# Patient Record
Sex: MALE | Race: WHITE | NOT HISPANIC OR LATINO | Employment: UNEMPLOYED | URBAN - METROPOLITAN AREA
[De-identification: names, ages, dates, MRNs, and addresses within clinical notes are randomized per-mention and may not be internally consistent; named-entity substitution may affect disease eponyms.]

---

## 2019-07-14 ENCOUNTER — HOSPITAL ENCOUNTER (EMERGENCY)
Facility: MEDICAL CENTER | Age: 59
End: 2019-07-15
Attending: EMERGENCY MEDICINE
Payer: MEDICARE

## 2019-07-14 ENCOUNTER — APPOINTMENT (OUTPATIENT)
Dept: RADIOLOGY | Facility: MEDICAL CENTER | Age: 59
End: 2019-07-14
Attending: EMERGENCY MEDICINE
Payer: MEDICARE

## 2019-07-14 DIAGNOSIS — S42.031A CLOSED DISPLACED FRACTURE OF ACROMIAL END OF RIGHT CLAVICLE, INITIAL ENCOUNTER: ICD-10-CM

## 2019-07-14 PROCEDURE — 71045 X-RAY EXAM CHEST 1 VIEW: CPT

## 2019-07-14 PROCEDURE — 73030 X-RAY EXAM OF SHOULDER: CPT | Mod: RT

## 2019-07-14 PROCEDURE — 99284 EMERGENCY DEPT VISIT MOD MDM: CPT

## 2019-07-14 PROCEDURE — A9270 NON-COVERED ITEM OR SERVICE: HCPCS | Performed by: EMERGENCY MEDICINE

## 2019-07-14 PROCEDURE — 700102 HCHG RX REV CODE 250 W/ 637 OVERRIDE(OP): Performed by: EMERGENCY MEDICINE

## 2019-07-14 RX ORDER — HYDROCODONE BITARTRATE AND ACETAMINOPHEN 5; 325 MG/1; MG/1
1 TABLET ORAL ONCE
Status: COMPLETED | OUTPATIENT
Start: 2019-07-15 | End: 2019-07-14

## 2019-07-14 RX ADMIN — HYDROCODONE BITARTRATE AND ACETAMINOPHEN 1 TABLET: 5; 325 TABLET ORAL at 23:36

## 2019-07-15 VITALS
OXYGEN SATURATION: 96 % | HEART RATE: 71 BPM | RESPIRATION RATE: 16 BRPM | WEIGHT: 166.89 LBS | DIASTOLIC BLOOD PRESSURE: 97 MMHG | BODY MASS INDEX: 22.6 KG/M2 | HEIGHT: 72 IN | SYSTOLIC BLOOD PRESSURE: 144 MMHG | TEMPERATURE: 97.7 F

## 2019-07-15 PROCEDURE — 304217 HCHG IRRIGATION SYSTEM

## 2019-07-15 RX ORDER — HYDROCODONE BITARTRATE AND ACETAMINOPHEN 5; 325 MG/1; MG/1
1 TABLET ORAL EVERY 6 HOURS PRN
Qty: 12 TAB | Refills: 0 | Status: SHIPPED | OUTPATIENT
Start: 2019-07-15 | End: 2019-07-18
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
## 2019-07-15 NOTE — ED NOTES
All lines, monitors DC'd. Discharge instructions given with prescription x1. Pt given time to ask any questions. Pt ambulatory out of department. All pt belongings in pt's possession. Pt verbalized understanding.
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
## 2021-03-15 DIAGNOSIS — Z23 NEED FOR VACCINATION: ICD-10-CM

## 2023-05-14 ENCOUNTER — HOSPITAL ENCOUNTER (EMERGENCY)
Facility: MEDICAL CENTER | Age: 63
End: 2023-05-14
Attending: EMERGENCY MEDICINE
Payer: MEDICARE

## 2023-05-14 ENCOUNTER — APPOINTMENT (OUTPATIENT)
Dept: RADIOLOGY | Facility: MEDICAL CENTER | Age: 63
End: 2023-05-14
Attending: EMERGENCY MEDICINE
Payer: MEDICARE

## 2023-05-14 VITALS
RESPIRATION RATE: 15 BRPM | SYSTOLIC BLOOD PRESSURE: 115 MMHG | TEMPERATURE: 98.2 F | DIASTOLIC BLOOD PRESSURE: 70 MMHG | BODY MASS INDEX: 24.28 KG/M2 | OXYGEN SATURATION: 95 % | HEART RATE: 85 BPM | WEIGHT: 179.23 LBS | HEIGHT: 72 IN

## 2023-05-14 DIAGNOSIS — R06.00 DYSPNEA, UNSPECIFIED TYPE: ICD-10-CM

## 2023-05-14 DIAGNOSIS — J98.4 PNEUMONITIS: ICD-10-CM

## 2023-05-14 LAB
ALBUMIN SERPL BCP-MCNC: 3.6 G/DL (ref 3.2–4.9)
ALBUMIN/GLOB SERPL: 1 G/DL
ALP SERPL-CCNC: 87 U/L (ref 30–99)
ALT SERPL-CCNC: 70 U/L (ref 2–50)
ANION GAP SERPL CALC-SCNC: 13 MMOL/L (ref 7–16)
ANISOCYTOSIS BLD QL SMEAR: ABNORMAL
AST SERPL-CCNC: 71 U/L (ref 12–45)
BASOPHILS # BLD AUTO: 1 % (ref 0–1.8)
BASOPHILS # BLD: 0.06 K/UL (ref 0–0.12)
BILIRUB SERPL-MCNC: 1 MG/DL (ref 0.1–1.5)
BUN SERPL-MCNC: 10 MG/DL (ref 8–22)
CALCIUM ALBUM COR SERPL-MCNC: 9.3 MG/DL (ref 8.5–10.5)
CALCIUM SERPL-MCNC: 9 MG/DL (ref 8.5–10.5)
CHLORIDE SERPL-SCNC: 102 MMOL/L (ref 96–112)
CO2 SERPL-SCNC: 24 MMOL/L (ref 20–33)
COMMENT 1642: NORMAL
CREAT SERPL-MCNC: 0.9 MG/DL (ref 0.5–1.4)
D DIMER PPP IA.FEU-MCNC: 2.23 UG/ML (FEU) (ref 0–0.5)
EKG IMPRESSION: NORMAL
EOSINOPHIL # BLD AUTO: 0.24 K/UL (ref 0–0.51)
EOSINOPHIL NFR BLD: 4.1 % (ref 0–6.9)
ERYTHROCYTE [DISTWIDTH] IN BLOOD BY AUTOMATED COUNT: 45.1 FL (ref 35.9–50)
FLUAV RNA SPEC QL NAA+PROBE: NEGATIVE
FLUBV RNA SPEC QL NAA+PROBE: NEGATIVE
GFR SERPLBLD CREATININE-BSD FMLA CKD-EPI: 95 ML/MIN/1.73 M 2
GLOBULIN SER CALC-MCNC: 3.5 G/DL (ref 1.9–3.5)
GLUCOSE SERPL-MCNC: 156 MG/DL (ref 65–99)
HCT VFR BLD AUTO: 48.4 % (ref 42–52)
HGB BLD-MCNC: 15.4 G/DL (ref 14–18)
IMM GRANULOCYTES # BLD AUTO: 0.03 K/UL (ref 0–0.11)
IMM GRANULOCYTES NFR BLD AUTO: 0.5 % (ref 0–0.9)
LYMPHOCYTES # BLD AUTO: 0.75 K/UL (ref 1–4.8)
LYMPHOCYTES NFR BLD: 12.9 % (ref 22–41)
MACROCYTES BLD QL SMEAR: ABNORMAL
MCH RBC QN AUTO: 29.2 PG (ref 27–33)
MCHC RBC AUTO-ENTMCNC: 31.8 G/DL (ref 33.7–35.3)
MCV RBC AUTO: 91.8 FL (ref 81.4–97.8)
MICROCYTES BLD QL SMEAR: ABNORMAL
MONOCYTES # BLD AUTO: 0.27 K/UL (ref 0–0.85)
MONOCYTES NFR BLD AUTO: 4.6 % (ref 0–13.4)
MORPHOLOGY BLD-IMP: NORMAL
NEUTROPHILS # BLD AUTO: 4.48 K/UL (ref 1.82–7.42)
NEUTROPHILS NFR BLD: 76.9 % (ref 44–72)
NRBC # BLD AUTO: 0 K/UL
NRBC BLD-RTO: 0 /100 WBC
NT-PROBNP SERPL IA-MCNC: 279 PG/ML (ref 0–125)
PLATELET # BLD AUTO: 74 K/UL (ref 164–446)
PLATELET BLD QL SMEAR: NORMAL
PMV BLD AUTO: 11.8 FL (ref 9–12.9)
POTASSIUM SERPL-SCNC: 4.2 MMOL/L (ref 3.6–5.5)
PROT SERPL-MCNC: 7.1 G/DL (ref 6–8.2)
RBC # BLD AUTO: 5.27 M/UL (ref 4.7–6.1)
RBC BLD AUTO: PRESENT
RSV RNA SPEC QL NAA+PROBE: NEGATIVE
SARS-COV-2 RNA RESP QL NAA+PROBE: NOTDETECTED
SODIUM SERPL-SCNC: 139 MMOL/L (ref 135–145)
SPECIMEN SOURCE: NORMAL
TROPONIN T SERPL-MCNC: 14 NG/L (ref 6–19)
WBC # BLD AUTO: 5.8 K/UL (ref 4.8–10.8)

## 2023-05-14 PROCEDURE — A9270 NON-COVERED ITEM OR SERVICE: HCPCS | Performed by: EMERGENCY MEDICINE

## 2023-05-14 PROCEDURE — 85025 COMPLETE CBC W/AUTO DIFF WBC: CPT

## 2023-05-14 PROCEDURE — 84484 ASSAY OF TROPONIN QUANT: CPT

## 2023-05-14 PROCEDURE — 93005 ELECTROCARDIOGRAM TRACING: CPT | Performed by: EMERGENCY MEDICINE

## 2023-05-14 PROCEDURE — 99285 EMERGENCY DEPT VISIT HI MDM: CPT

## 2023-05-14 PROCEDURE — 71045 X-RAY EXAM CHEST 1 VIEW: CPT

## 2023-05-14 PROCEDURE — 85379 FIBRIN DEGRADATION QUANT: CPT

## 2023-05-14 PROCEDURE — 36415 COLL VENOUS BLD VENIPUNCTURE: CPT

## 2023-05-14 PROCEDURE — 0241U HCHG SARS-COV-2 COVID-19 NFCT DS RESP RNA 4 TRGT MIC: CPT

## 2023-05-14 PROCEDURE — C9803 HOPD COVID-19 SPEC COLLECT: HCPCS | Performed by: EMERGENCY MEDICINE

## 2023-05-14 PROCEDURE — 700102 HCHG RX REV CODE 250 W/ 637 OVERRIDE(OP): Performed by: EMERGENCY MEDICINE

## 2023-05-14 PROCEDURE — 83880 ASSAY OF NATRIURETIC PEPTIDE: CPT

## 2023-05-14 PROCEDURE — 71275 CT ANGIOGRAPHY CHEST: CPT

## 2023-05-14 PROCEDURE — 93005 ELECTROCARDIOGRAM TRACING: CPT

## 2023-05-14 PROCEDURE — 700117 HCHG RX CONTRAST REV CODE 255: Performed by: EMERGENCY MEDICINE

## 2023-05-14 PROCEDURE — 80053 COMPREHEN METABOLIC PANEL: CPT

## 2023-05-14 RX ORDER — ALBUTEROL SULFATE 90 UG/1
2 AEROSOL, METERED RESPIRATORY (INHALATION) EVERY 6 HOURS PRN
Qty: 8.5 G | Refills: 0 | Status: SHIPPED | OUTPATIENT
Start: 2023-05-14 | End: 2023-08-16

## 2023-05-14 RX ORDER — BENZONATATE 100 MG/1
100 CAPSULE ORAL 3 TIMES DAILY PRN
Status: DISCONTINUED | OUTPATIENT
Start: 2023-05-14 | End: 2023-05-14 | Stop reason: HOSPADM

## 2023-05-14 RX ORDER — BENZONATATE 100 MG/1
100 CAPSULE ORAL 3 TIMES DAILY PRN
Qty: 60 CAPSULE | Refills: 0 | Status: SHIPPED | OUTPATIENT
Start: 2023-05-14 | End: 2023-08-16

## 2023-05-14 RX ADMIN — IOHEXOL 57 ML: 350 INJECTION, SOLUTION INTRAVENOUS at 14:48

## 2023-05-14 RX ADMIN — BENZONATATE 100 MG: 100 CAPSULE ORAL at 14:00

## 2023-05-14 NOTE — ED NOTES
Pt ambulated to [facility] with steady gait, changed into a gown and placed on the monitor.   Pt now reports that SOB, cough and leg pain have resolved.

## 2023-05-14 NOTE — ED TRIAGE NOTES
Pt ambulated to triage with   Chief Complaint   Patient presents with    Cough     Started when waking up this morning with difficulty breathing and pain in the back of his legs.  And sweating his morning.     Shortness of Breath    Leg Pain     EKG completed.   Protocol ordered.  Pt Informed regarding triage process and verbalized understanding to inform triage tech or RN for any changes in condition. Placed in lobby.

## 2023-05-14 NOTE — ED NOTES
PIV d/c. Pt provided d/c instructions and verbalizes understanding with no further questions. Rx sent to pt's requested pharmacy. Home care instructions explained. Pt ambulatory to ED sergio

## 2023-05-14 NOTE — ED NOTES
Pt medicated per MAR. COVID swab collected and sent to the lab. Pt has no additional requests at this time.

## 2023-05-14 NOTE — ED PROVIDER NOTES
ED Provider Note    CHIEF COMPLAINT  Chief Complaint   Patient presents with    Cough     Started when waking up this morning with difficulty breathing and pain in the back of his legs.  And sweating his morning.     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 male who presents complaining of cough, shortness of breath.  He states his increasing cough and shortness of breath started this morning.  He states when he is coughing he is tugging on the back of his legs and his hamstring area but is not coughing there is no discomfort.  He states when he had his myocardial infarction he had pain to his upper arms and is concerned that this may be similar.  Patient is swelling of his lower extremities, fever, shakes, chills, sweats, nausea, vomiting, chest pain, history of DVT or pulmonary embolism.  Does smoke cigarettes for several years and states he has not been diagnosed with disease.    PAST MEDICAL HISTORY   has a past medical history of CAD (coronary artery disease), MI (myocardial infarction) ([facility]), and Sciatica.    SURGICAL HISTORY   has a past surgical history that includes other cardiac surgery and vasectomy.    FAMILY HISTORY  History reviewed. No pertinent family history.    SOCIAL HISTORY  Social History     Tobacco Use    Smoking status: Some Days     Types: Cigarettes    Smokeless tobacco: Never    Tobacco comments:     10-12 cigs/day   Vaping Use    Vaping Use: Never used   Substance and Sexual Activity    Alcohol use: Yes     Comment: rare    Drug use: Yes     Types: Inhaled     Comment: marijuana      Sexual activity: Not on file       CURRENT MEDICATIONS  Home Medications       Reviewed by Holly Wilkinson R.N. (Registered Nurse)  on 05/14/23 at 1128  Med List Status: Partial     Medication Last Dose Status   ASPIRIN 325 MG TABS  Active   DAILY VITAMIN PO  Active                    ALLERGIES  Allergies   Allergen Reactions    Cortisone Rash       PHYSICAL EXAM  VITAL SIGNS: /70   Pulse 85   Temp 36.8 °C (98.2 °F) (Temporal)   Resp 15   Ht 1.829 m (6')   Wt 81.3 kg (179 lb 3.7 oz)   SpO2 95%   BMI 24.31 kg/m²      Nursing notes and vitals reviewed.  Constitutional: Well developed, Well nourished, No acute distress, Non-toxic appearance.   Eyes: PERRLA, EOMI, Conjunctiva normal, No discharge.   HENT: No pharyngeal erythema, edema   Cardiovascular: Normal heart rate, Normal rhythm, No murmurs, No rubs, No gallops.   Thorax & Lungs: No respiratory distress, No rales, No rhonchi, No wheezing, No chest tenderness.   Abdomen: Bowel sounds normal, Soft, No tenderness, No guarding, No rebound, No masses, No pulsatile masses.   Skin: Warm, Dry, No erythema, No rash.   Extremities: No deformity, no pedal edema, good range of motion range of motion upper lower extremes bilaterally  Neurologic: Alert & oriented x 3, no focal abnormalities noted, acting appropriately on examination  Psychiatric: Affect normal for clinical presentation.      DIAGNOSTIC STUDIES / PROCEDURES  EKG  I have independently interpreted this EKG  Normal sinus on monitor    LABS  Results for orders placed or performed during the hospital encounter of 05/14/23   CBC with Differential   Result Value Ref Range    WBC 5.8 4.8 - 10.8 K/uL    RBC 5.27 4.70 - 6.10 M/uL    Hemoglobin 15.4 14.0 - 18.0 g/dL    Hematocrit 48.4 42.0 - 52.0 %    MCV 91.8 81.4 - 97.8 fL    MCH 29.2 27.0 - 33.0 pg    MCHC 31.8 (L) 33.7 - 35.3 g/dL    RDW 45.1 35.9 - 50.0 fL    Platelet Count 74 (L) 164 - 446 K/uL    MPV 11.8 9.0 - 12.9 fL    Neutrophils-Polys 76.90 (H) 44.00 - 72.00 %    Lymphocytes 12.90 (L) 22.00 - 41.00 %    Monocytes 4.60 0.00 - 13.40 %    Eosinophils 4.10 0.00 - 6.90 %     Basophils 1.00 0.00 - 1.80 %    Immature Granulocytes 0.50 0.00 - 0.90 %    Nucleated RBC 0.00 /100 WBC    Neutrophils (Absolute) 4.48 1.82 - 7.42 K/uL    Lymphs (Absolute) 0.75 (L) 1.00 - 4.80 K/uL    Monos (Absolute) 0.27 0.00 - 0.85 K/uL    Eos (Absolute) 0.24 0.00 - 0.51 K/uL    Baso (Absolute) 0.06 0.00 - 0.12 K/uL    Immature Granulocytes (abs) 0.03 0.00 - 0.11 K/uL    NRBC (Absolute) 0.00 K/uL    Anisocytosis 1+     Macrocytosis 1+     Microcytosis 1+    Comp Metabolic Panel   Result Value Ref Range    Sodium 139 135 - 145 mmol/L    Potassium 4.2 3.6 - 5.5 mmol/L    Chloride 102 96 - 112 mmol/L    Co2 24 20 - 33 mmol/L    Anion Gap 13.0 7.0 - 16.0    Glucose 156 (H) 65 - 99 mg/dL    Bun 10 8 - 22 mg/dL    Creatinine 0.90 0.50 - 1.40 mg/dL    Calcium 9.0 8.5 - 10.5 mg/dL    AST(SGOT) 71 (H) 12 - 45 U/L    ALT(SGPT) 70 (H) 2 - 50 U/L    Alkaline Phosphatase 87 30 - 99 U/L    Total Bilirubin 1.0 0.1 - 1.5 mg/dL    Albumin 3.6 3.2 - 4.9 g/dL    Total Protein 7.1 6.0 - 8.2 g/dL    Globulin 3.5 1.9 - 3.5 g/dL    A-G Ratio 1.0 g/dL   D-DIMER   Result Value Ref Range    D-Dimer 2.23 (H) 0.00 - 0.50 ug/mL (FEU)   CoV-2, FLU A/B, and RSV by PCR (2-4 Hours CEPHEID) : Collect NP swab in VTM    Specimen: Respirate   Result Value Ref Range    Influenza virus A RNA Negative Negative    Influenza virus B, PCR Negative Negative    RSV, PCR Negative Negative    SARS-CoV-2 by PCR NotDetected     SARS-CoV-2 Source NP Swab    proBrain Natriuretic Peptide, NT   Result Value Ref Range    NT-proBNP 279 (H) 0 - 125 pg/mL   TROPONIN   Result Value Ref Range    Troponin T 14 6 - 19 ng/L   CORRECTED CALCIUM   Result Value Ref Range    Correct Calcium 9.3 8.5 - 10.5 mg/dL   ESTIMATED GFR   Result Value Ref Range    GFR (CKD-EPI) 95 >60 mL/min/1.73 m 2   PERIPHERAL SMEAR REVIEW   Result Value Ref Range    Peripheral Smear Review see below    PLATELET ESTIMATE   Result Value Ref Range    Plt Estimation Decreased    MORPHOLOGY   Result  Value Ref Range    RBC Morphology Present    DIFFERENTIAL COMMENT   Result Value Ref Range    Comments-Diff see below    EKG (NOW)   Result Value Ref Range    Report       [facility] Emergency Dept.    Test Date:  2023  Pt Name:    GABRIEL FINE                 Department: ER  MRN:        [id number]                      Room:        15  Gender:     Male                         Technician: [id number]  :        1960                   Requested By:ER TRIAGE PROTOCOL  Order #:    [id number]                    Reading MD: ANNELISE VEGA DO    Measurements  Intervals                                Axis  Rate:       79                           P:          63  UT:         141                          QRS:        21  QRSD:       114                          T:          57  QT:         425  QTc:        488    Interpretive Statements  Sinus rhythm  Left atrial enlargement  Consider right ventricular hypertrophy  Inferior infarct, old  Compared to ECG 2009 08:59:35  Atrial abnormality now present  Sinus bradycardia no longer present  ST (T wave) deviation no longer present  Myocardial infarct finding still present  Electronic ally Signed On 2023 12:15:43 PDT by ANNELISE VEGA DO           RADIOLOGY  I have independently interpreted the diagnostic imaging associated with this visit and am waiting the final reading from the radiologist.   My preliminary interpretation is as follows: Chest x-ray is negative for infiltrate or edema  Radiologist interpretation:   CT-CTA CHEST PULMONARY ARTERY W/ RECONS   Final Result         1.  No evidence of pulmonary embolism.   2.  Partially visualized morphologic changes of chronic liver disease/cirrhosis with splenomegaly and presumed varices.   3.  A nonspecific mildly enlarged subcarinal mediastinal lymph node.   4.  Mild peripheral groundglass and reticular opacities most pronounced in the lung apices are of uncertain chronicity. Possible  chronic changes/fibrosis however mild infectious/inflammatory etiology cannot be excluded.      Fleischner Society pulmonary nodule recommendations:         DX-CHEST-PORTABLE (1 VIEW)   Final Result      No evidence of acute cardiopulmonary process.            COURSE & MEDICAL DECISION MAKING    ED Observation Status? Yes; I am placing the patient in to an observation status due to a diagnostic uncertainty as well as therapeutic intensity. Patient placed in observation status at 12:15 PM, 5/14/2023.     Observation plan is as follows: Chest x-ray, troponin, EKG  Upon Reevaluation, the patient's condition has: Improved; and will be discharged.    Patient discharged from ED Observation status at 1530 (Time) 5/14/23 (Date).     INITIAL ASSESSMENT, COURSE AND PLAN  Care Narrative: This is a charming 63-year-old male who presents with cough.  Here he has no evidence of myocardial infarction on EKG and has a heart score of 5 yet is not complaining of chest pain, and has negative troponin negative EKG.  In addition, the patient elevated D-dimer and is concern for possible pulmonary embolism as the patient's chest x-ray was negative for infiltrate.  CT pulm angiogram was negative for pulmonary embolism although he is some slight groundglass opacities.  The patient was observed for several hours and not once today of hypoxia, severe coughing fit, chest pain and is resting comfortably after Tessalon Perle.  I do not believe the patient has acute coronary syndrome, myocardial infarction, pulmonary embolism as her clinical historical complaints are significant for aortic dissection or aortic aneurysm.  X-ray is negative but CT does show slight groundglass opacities considered pneumonitis and probable viral infection.  I do not believe the patient requires antibiotic as he does not have evidence of leukocytosis, is afebrile and no focal infiltrate.  The patient received prescription for Tessalon Perles he stated that it helped  him considerably in addition albuterol inhaler has been ordered.  The patient stands and need to return to the emergency part for increasing symptomology.  He has but the tugging on his legs I do not believe he has a DVT or bilateral DVTs he states he only feels slight tightening of the muscles when he is coughing was concerned because when he has heart attack he had weird feeling in his arms.  I did discuss the fact that there is a to complete systems he understands and states he is very grateful he came like to go home.  HTN/IDDM FOLLOW UP:  The patient has known hypertension and is being followed by their primary care doctor      ADDITIONAL PROBLEM LIST  History of myocardial infarction: No evidence of myocardial infarction or acute coronary syndrome.  Smoking history  DISPOSITION AND DISCUSSIONS      FINAL DIAGNOSIS  1. Dyspnea, unspecified type    2. Pneumonitis      DISPOSITION:  Patient will be discharged home in stable condition.    FOLLOW UP:  [facility], Emergency Dept  [address] [facility] [facility] [address] [phone]    If symptoms worsen      OUTPATIENT MEDICATIONS:  New Prescriptions    ALBUTEROL 108 (90 BASE) MCG/ACT AERO SOLN INHALATION AEROSOL    Inhale 2 Puffs every 6 hours as needed for Shortness of Breath.    BENZONATATE (TESSALON) 100 MG CAP    Take 1 Capsule by mouth 3 times a day as needed for Cough.         Electronically signed by: Pillo Thurman D.O., 5/14/2023 12:15 PM

## 2023-08-15 ENCOUNTER — APPOINTMENT (OUTPATIENT)
Dept: RADIOLOGY | Facility: MEDICAL CENTER | Age: 63
End: 2023-08-15
Attending: EMERGENCY MEDICINE
Payer: MEDICARE

## 2023-08-15 ENCOUNTER — HOSPITAL ENCOUNTER (EMERGENCY)
Facility: MEDICAL CENTER | Age: 63
End: 2023-08-15
Attending: EMERGENCY MEDICINE
Payer: MEDICARE

## 2023-08-15 VITALS
RESPIRATION RATE: 20 BRPM | TEMPERATURE: 97.3 F | WEIGHT: 180 LBS | HEART RATE: 80 BPM | OXYGEN SATURATION: 97 % | HEIGHT: 72 IN | SYSTOLIC BLOOD PRESSURE: 137 MMHG | BODY MASS INDEX: 24.38 KG/M2 | DIASTOLIC BLOOD PRESSURE: 80 MMHG

## 2023-08-15 DIAGNOSIS — R55 NEAR SYNCOPE: ICD-10-CM

## 2023-08-15 LAB
ALBUMIN SERPL BCP-MCNC: 3.1 G/DL (ref 3.2–4.9)
ALBUMIN/GLOB SERPL: 1.3 G/DL
ALP SERPL-CCNC: 59 U/L (ref 30–99)
ALT SERPL-CCNC: 28 U/L (ref 2–50)
ANION GAP SERPL CALC-SCNC: 8 MMOL/L (ref 7–16)
AST SERPL-CCNC: 26 U/L (ref 12–45)
BASOPHILS # BLD AUTO: 0.8 % (ref 0–1.8)
BASOPHILS # BLD: 0.04 K/UL (ref 0–0.12)
BILIRUB SERPL-MCNC: 0.6 MG/DL (ref 0.1–1.5)
BUN SERPL-MCNC: 22 MG/DL (ref 8–22)
CALCIUM ALBUM COR SERPL-MCNC: 8.9 MG/DL (ref 8.5–10.5)
CALCIUM SERPL-MCNC: 8.2 MG/DL (ref 8.5–10.5)
CHLORIDE SERPL-SCNC: 106 MMOL/L (ref 96–112)
CO2 SERPL-SCNC: 25 MMOL/L (ref 20–33)
CREAT SERPL-MCNC: 1.19 MG/DL (ref 0.5–1.4)
EKG IMPRESSION: NORMAL
EOSINOPHIL # BLD AUTO: 0.08 K/UL (ref 0–0.51)
EOSINOPHIL NFR BLD: 1.5 % (ref 0–6.9)
ERYTHROCYTE [DISTWIDTH] IN BLOOD BY AUTOMATED COUNT: 46.1 FL (ref 35.9–50)
GFR SERPLBLD CREATININE-BSD FMLA CKD-EPI: 68 ML/MIN/1.73 M 2
GLOBULIN SER CALC-MCNC: 2.4 G/DL (ref 1.9–3.5)
GLUCOSE SERPL-MCNC: 169 MG/DL (ref 65–99)
HCT VFR BLD AUTO: 33.7 % (ref 42–52)
HGB BLD-MCNC: 10.8 G/DL (ref 14–18)
IMM GRANULOCYTES # BLD AUTO: 0.03 K/UL (ref 0–0.11)
IMM GRANULOCYTES NFR BLD AUTO: 0.6 % (ref 0–0.9)
LYMPHOCYTES # BLD AUTO: 0.96 K/UL (ref 1–4.8)
LYMPHOCYTES NFR BLD: 18.5 % (ref 22–41)
MCH RBC QN AUTO: 30.1 PG (ref 27–33)
MCHC RBC AUTO-ENTMCNC: 32 G/DL (ref 32.3–36.5)
MCV RBC AUTO: 93.9 FL (ref 81.4–97.8)
MONOCYTES # BLD AUTO: 0.4 K/UL (ref 0–0.85)
MONOCYTES NFR BLD AUTO: 7.7 % (ref 0–13.4)
NEUTROPHILS # BLD AUTO: 3.68 K/UL (ref 1.82–7.42)
NEUTROPHILS NFR BLD: 70.9 % (ref 44–72)
NRBC # BLD AUTO: 0 K/UL
NRBC BLD-RTO: 0 /100 WBC (ref 0–0.2)
PLATELET # BLD AUTO: 106 K/UL (ref 164–446)
PMV BLD AUTO: 12.6 FL (ref 9–12.9)
POTASSIUM SERPL-SCNC: 3.9 MMOL/L (ref 3.6–5.5)
PROT SERPL-MCNC: 5.5 G/DL (ref 6–8.2)
RBC # BLD AUTO: 3.59 M/UL (ref 4.7–6.1)
SODIUM SERPL-SCNC: 139 MMOL/L (ref 135–145)
TROPONIN T SERPL-MCNC: 7 NG/L (ref 6–19)
TROPONIN T SERPL-MCNC: 9 NG/L (ref 6–19)
WBC # BLD AUTO: 5.2 K/UL (ref 4.8–10.8)

## 2023-08-15 PROCEDURE — 36415 COLL VENOUS BLD VENIPUNCTURE: CPT

## 2023-08-15 PROCEDURE — 80053 COMPREHEN METABOLIC PANEL: CPT

## 2023-08-15 PROCEDURE — 700105 HCHG RX REV CODE 258: Mod: JZ | Performed by: EMERGENCY MEDICINE

## 2023-08-15 PROCEDURE — 93005 ELECTROCARDIOGRAM TRACING: CPT

## 2023-08-15 PROCEDURE — 71045 X-RAY EXAM CHEST 1 VIEW: CPT

## 2023-08-15 PROCEDURE — 99284 EMERGENCY DEPT VISIT MOD MDM: CPT

## 2023-08-15 PROCEDURE — 93005 ELECTROCARDIOGRAM TRACING: CPT | Performed by: EMERGENCY MEDICINE

## 2023-08-15 PROCEDURE — 84484 ASSAY OF TROPONIN QUANT: CPT

## 2023-08-15 PROCEDURE — 85025 COMPLETE CBC W/AUTO DIFF WBC: CPT

## 2023-08-15 RX ORDER — SODIUM CHLORIDE, SODIUM LACTATE, POTASSIUM CHLORIDE, CALCIUM CHLORIDE 600; 310; 30; 20 MG/100ML; MG/100ML; MG/100ML; MG/100ML
1000 INJECTION, SOLUTION INTRAVENOUS ONCE
Status: COMPLETED | OUTPATIENT
Start: 2023-08-15 | End: 2023-08-15

## 2023-08-15 RX ADMIN — SODIUM CHLORIDE, POTASSIUM CHLORIDE, SODIUM LACTATE AND CALCIUM CHLORIDE 1000 ML: 600; 310; 30; 20 INJECTION, SOLUTION INTRAVENOUS at 19:02

## 2023-08-15 ASSESSMENT — FIBROSIS 4 INDEX: FIB4 SCORE: 7.34

## 2023-08-16 ENCOUNTER — APPOINTMENT (OUTPATIENT)
Dept: RADIOLOGY | Facility: MEDICAL CENTER | Age: 63
DRG: 432 | End: 2023-08-16
Attending: EMERGENCY MEDICINE
Payer: MEDICARE

## 2023-08-16 ENCOUNTER — HOSPITAL ENCOUNTER (INPATIENT)
Facility: MEDICAL CENTER | Age: 63
LOS: 7 days | DRG: 432 | End: 2023-08-23
Attending: EMERGENCY MEDICINE | Admitting: STUDENT IN AN ORGANIZED HEALTH CARE EDUCATION/TRAINING PROGRAM
Payer: MEDICARE

## 2023-08-16 DIAGNOSIS — D64.9 ANEMIA, UNSPECIFIED TYPE: ICD-10-CM

## 2023-08-16 DIAGNOSIS — K92.2 GASTROINTESTINAL HEMORRHAGE, UNSPECIFIED GASTROINTESTINAL HEMORRHAGE TYPE: ICD-10-CM

## 2023-08-16 DIAGNOSIS — C22.0 HEPATOCELLULAR CARCINOMA (HCC): ICD-10-CM

## 2023-08-16 DIAGNOSIS — K74.60 CIRRHOSIS OF LIVER WITHOUT ASCITES, UNSPECIFIED HEPATIC CIRRHOSIS TYPE (HCC): ICD-10-CM

## 2023-08-16 PROBLEM — R53.1 WEAKNESS: Status: ACTIVE | Noted: 2023-08-16

## 2023-08-16 PROBLEM — I25.2 HISTORY OF MI (MYOCARDIAL INFARCTION): Status: ACTIVE | Noted: 2023-08-16

## 2023-08-16 LAB
ABO + RH BLD: NORMAL
ABO GROUP BLD: NORMAL
ALBUMIN SERPL BCP-MCNC: 3.6 G/DL (ref 3.2–4.9)
ALBUMIN/GLOB SERPL: 1.3 G/DL
ALP SERPL-CCNC: 67 U/L (ref 30–99)
ALT SERPL-CCNC: 41 U/L (ref 2–50)
ANION GAP SERPL CALC-SCNC: 11 MMOL/L (ref 7–16)
APTT PPP: 28.9 SEC (ref 24.7–36)
AST SERPL-CCNC: 49 U/L (ref 12–45)
BARCODED ABORH UBTYP: 6200
BARCODED PRD CODE UBPRD: NORMAL
BARCODED UNIT NUM UBUNT: NORMAL
BASOPHILS # BLD AUTO: 1 % (ref 0–1.8)
BASOPHILS # BLD: 0.07 K/UL (ref 0–0.12)
BILIRUB SERPL-MCNC: 0.8 MG/DL (ref 0.1–1.5)
BLD GP AB SCN SERPL QL: NORMAL
BUN SERPL-MCNC: 31 MG/DL (ref 8–22)
CALCIUM ALBUM COR SERPL-MCNC: 9.1 MG/DL (ref 8.5–10.5)
CALCIUM SERPL-MCNC: 8.8 MG/DL (ref 8.5–10.5)
CFT BLD TEG: 5.2 MIN (ref 4.6–9.1)
CFT P HPASE BLD TEG: 5 MIN (ref 4.3–8.3)
CHLORIDE SERPL-SCNC: 107 MMOL/L (ref 96–112)
CLOT ANGLE BLD TEG: 71.4 DEGREES (ref 63–78)
CLOT LYSIS 30M P MA LENFR BLD TEG: 3.4 % (ref 0–2.6)
CO2 SERPL-SCNC: 23 MMOL/L (ref 20–33)
COMPONENT R 8504R: NORMAL
CREAT SERPL-MCNC: 0.73 MG/DL (ref 0.5–1.4)
CT.EXTRINSIC BLD ROTEM: 1.3 MIN (ref 0.8–2.1)
EKG IMPRESSION: NORMAL
EKG IMPRESSION: NORMAL
EOSINOPHIL # BLD AUTO: 0.1 K/UL (ref 0–0.51)
EOSINOPHIL NFR BLD: 1.4 % (ref 0–6.9)
ERYTHROCYTE [DISTWIDTH] IN BLOOD BY AUTOMATED COUNT: 45.3 FL (ref 35.9–50)
GFR SERPLBLD CREATININE-BSD FMLA CKD-EPI: 101 ML/MIN/1.73 M 2
GLOBULIN SER CALC-MCNC: 2.8 G/DL (ref 1.9–3.5)
GLUCOSE SERPL-MCNC: 127 MG/DL (ref 65–99)
HCT VFR BLD AUTO: 34.7 % (ref 42–52)
HGB BLD-MCNC: 11.4 G/DL (ref 14–18)
IMM GRANULOCYTES # BLD AUTO: 0.03 K/UL (ref 0–0.11)
IMM GRANULOCYTES NFR BLD AUTO: 0.4 % (ref 0–0.9)
INR PPP: 1.17 (ref 0.87–1.13)
LIPASE SERPL-CCNC: 16 U/L (ref 11–82)
LYMPHOCYTES # BLD AUTO: 1.42 K/UL (ref 1–4.8)
LYMPHOCYTES NFR BLD: 19.9 % (ref 22–41)
MCF BLD TEG: 55 MM (ref 52–69)
MCF.PLATELET INHIB BLD ROTEM: 22.8 MM (ref 15–32)
MCH RBC QN AUTO: 29.7 PG (ref 27–33)
MCHC RBC AUTO-ENTMCNC: 32.9 G/DL (ref 32.3–36.5)
MCV RBC AUTO: 90.4 FL (ref 81.4–97.8)
MONOCYTES # BLD AUTO: 0.41 K/UL (ref 0–0.85)
MONOCYTES NFR BLD AUTO: 5.8 % (ref 0–13.4)
NEUTROPHILS # BLD AUTO: 5.09 K/UL (ref 1.82–7.42)
NEUTROPHILS NFR BLD: 71.5 % (ref 44–72)
NRBC # BLD AUTO: 0 K/UL
NRBC BLD-RTO: 0 /100 WBC (ref 0–0.2)
PA AA BLD-ACNC: 0 % (ref 0–11)
PA ADP BLD-ACNC: 0 % (ref 0–17)
PLATELET # BLD AUTO: 154 K/UL (ref 164–446)
PMV BLD AUTO: 12.2 FL (ref 9–12.9)
POTASSIUM SERPL-SCNC: 4 MMOL/L (ref 3.6–5.5)
PRODUCT TYPE UPROD: NORMAL
PROT SERPL-MCNC: 6.4 G/DL (ref 6–8.2)
PROTHROMBIN TIME: 14.8 SEC (ref 12–14.6)
RBC # BLD AUTO: 3.84 M/UL (ref 4.7–6.1)
RH BLD: NORMAL
SODIUM SERPL-SCNC: 141 MMOL/L (ref 135–145)
TEG ALGORITHM TGALG: ABNORMAL
TROPONIN T SERPL-MCNC: 8 NG/L (ref 6–19)
UNIT STATUS USTAT: NORMAL
WBC # BLD AUTO: 7.1 K/UL (ref 4.8–10.8)

## 2023-08-16 PROCEDURE — 74174 CTA ABD&PLVS W/CONTRAST: CPT

## 2023-08-16 PROCEDURE — 85384 FIBRINOGEN ACTIVITY: CPT

## 2023-08-16 PROCEDURE — 85610 PROTHROMBIN TIME: CPT

## 2023-08-16 PROCEDURE — 85347 COAGULATION TIME ACTIVATED: CPT

## 2023-08-16 PROCEDURE — 86901 BLOOD TYPING SEROLOGIC RH(D): CPT

## 2023-08-16 PROCEDURE — 93005 ELECTROCARDIOGRAM TRACING: CPT | Performed by: EMERGENCY MEDICINE

## 2023-08-16 PROCEDURE — 770020 HCHG ROOM/CARE - TELE (206)

## 2023-08-16 PROCEDURE — 99285 EMERGENCY DEPT VISIT HI MDM: CPT

## 2023-08-16 PROCEDURE — 700117 HCHG RX CONTRAST REV CODE 255: Performed by: EMERGENCY MEDICINE

## 2023-08-16 PROCEDURE — 85730 THROMBOPLASTIN TIME PARTIAL: CPT

## 2023-08-16 PROCEDURE — 36415 COLL VENOUS BLD VENIPUNCTURE: CPT

## 2023-08-16 PROCEDURE — 85025 COMPLETE CBC W/AUTO DIFF WBC: CPT

## 2023-08-16 PROCEDURE — 85576 BLOOD PLATELET AGGREGATION: CPT | Mod: 91

## 2023-08-16 PROCEDURE — 96366 THER/PROPH/DIAG IV INF ADDON: CPT

## 2023-08-16 PROCEDURE — 80053 COMPREHEN METABOLIC PANEL: CPT

## 2023-08-16 PROCEDURE — 96365 THER/PROPH/DIAG IV INF INIT: CPT

## 2023-08-16 PROCEDURE — C9113 INJ PANTOPRAZOLE SODIUM, VIA: HCPCS | Performed by: EMERGENCY MEDICINE

## 2023-08-16 PROCEDURE — 96375 TX/PRO/DX INJ NEW DRUG ADDON: CPT

## 2023-08-16 PROCEDURE — 700111 HCHG RX REV CODE 636 W/ 250 OVERRIDE (IP): Mod: JA

## 2023-08-16 PROCEDURE — 700111 HCHG RX REV CODE 636 W/ 250 OVERRIDE (IP): Mod: JZ | Performed by: EMERGENCY MEDICINE

## 2023-08-16 PROCEDURE — 99291 CRITICAL CARE FIRST HOUR: CPT | Mod: AI,GC | Performed by: STUDENT IN AN ORGANIZED HEALTH CARE EDUCATION/TRAINING PROGRAM

## 2023-08-16 PROCEDURE — 700105 HCHG RX REV CODE 258: Performed by: EMERGENCY MEDICINE

## 2023-08-16 PROCEDURE — 86900 BLOOD TYPING SEROLOGIC ABO: CPT

## 2023-08-16 PROCEDURE — 84484 ASSAY OF TROPONIN QUANT: CPT

## 2023-08-16 PROCEDURE — 71045 X-RAY EXAM CHEST 1 VIEW: CPT

## 2023-08-16 PROCEDURE — 93005 ELECTROCARDIOGRAM TRACING: CPT

## 2023-08-16 PROCEDURE — 83690 ASSAY OF LIPASE: CPT

## 2023-08-16 PROCEDURE — 86850 RBC ANTIBODY SCREEN: CPT

## 2023-08-16 PROCEDURE — 700105 HCHG RX REV CODE 258: Mod: JZ

## 2023-08-16 RX ORDER — LABETALOL HYDROCHLORIDE 5 MG/ML
10 INJECTION, SOLUTION INTRAVENOUS EVERY 4 HOURS PRN
Status: DISCONTINUED | OUTPATIENT
Start: 2023-08-16 | End: 2023-08-23 | Stop reason: HOSPADM

## 2023-08-16 RX ORDER — OCTREOTIDE ACETATE 100 UG/ML
50 INJECTION, SOLUTION INTRAVENOUS; SUBCUTANEOUS ONCE
Status: COMPLETED | OUTPATIENT
Start: 2023-08-16 | End: 2023-08-16

## 2023-08-16 RX ORDER — PANTOPRAZOLE SODIUM 40 MG/10ML
40 INJECTION, POWDER, LYOPHILIZED, FOR SOLUTION INTRAVENOUS 2 TIMES DAILY
Status: DISCONTINUED | OUTPATIENT
Start: 2023-08-17 | End: 2023-08-23

## 2023-08-16 RX ORDER — CEFTRIAXONE 2 G/1
2000 INJECTION, POWDER, FOR SOLUTION INTRAMUSCULAR; INTRAVENOUS ONCE
Status: COMPLETED | OUTPATIENT
Start: 2023-08-16 | End: 2023-08-16

## 2023-08-16 RX ORDER — SODIUM CHLORIDE, SODIUM LACTATE, POTASSIUM CHLORIDE, CALCIUM CHLORIDE 600; 310; 30; 20 MG/100ML; MG/100ML; MG/100ML; MG/100ML
2000 INJECTION, SOLUTION INTRAVENOUS CONTINUOUS
Status: DISCONTINUED | OUTPATIENT
Start: 2023-08-16 | End: 2023-08-16

## 2023-08-16 RX ORDER — SODIUM CHLORIDE 9 MG/ML
1000 INJECTION, SOLUTION INTRAVENOUS ONCE
Status: COMPLETED | OUTPATIENT
Start: 2023-08-16 | End: 2023-08-16

## 2023-08-16 RX ORDER — SODIUM CHLORIDE 9 MG/ML
INJECTION, SOLUTION INTRAVENOUS CONTINUOUS
Status: DISCONTINUED | OUTPATIENT
Start: 2023-08-16 | End: 2023-08-17

## 2023-08-16 RX ADMIN — SODIUM CHLORIDE 1000 ML: 9 INJECTION, SOLUTION INTRAVENOUS at 17:17

## 2023-08-16 RX ADMIN — CEFTRIAXONE SODIUM 2000 MG: 2 INJECTION, POWDER, FOR SOLUTION INTRAMUSCULAR; INTRAVENOUS at 19:41

## 2023-08-16 RX ADMIN — OCTREOTIDE ACETATE 50 MCG/HR: 200 INJECTION, SOLUTION INTRAVENOUS; SUBCUTANEOUS at 23:27

## 2023-08-16 RX ADMIN — IOHEXOL 100 ML: 350 INJECTION, SOLUTION INTRAVENOUS at 16:27

## 2023-08-16 RX ADMIN — OCTREOTIDE ACETATE 50 MCG: 100 INJECTION, SOLUTION INTRAVENOUS; SUBCUTANEOUS at 23:22

## 2023-08-16 RX ADMIN — PANTOPRAZOLE SODIUM 80 MG: 40 INJECTION, POWDER, FOR SOLUTION INTRAVENOUS at 17:16

## 2023-08-16 RX ADMIN — SODIUM CHLORIDE: 9 INJECTION, SOLUTION INTRAVENOUS at 20:50

## 2023-08-16 ASSESSMENT — ENCOUNTER SYMPTOMS
BRUISES/BLEEDS EASILY: 0
EYE DISCHARGE: 0
DIARRHEA: 0
CHILLS: 0
CONSTIPATION: 0
PALPITATIONS: 0
SENSORY CHANGE: 0
HEADACHES: 0
WHEEZING: 0
SORE THROAT: 0
HEARTBURN: 1
BLURRED VISION: 0
VOMITING: 0
NERVOUS/ANXIOUS: 0
ORTHOPNEA: 0
NECK PAIN: 0
CLAUDICATION: 0
WEAKNESS: 1
BLOOD IN STOOL: 1
WEIGHT LOSS: 0
SHORTNESS OF BREATH: 0
DOUBLE VISION: 0
NAUSEA: 1
BACK PAIN: 0
INSOMNIA: 0
FEVER: 0
COUGH: 0
SPUTUM PRODUCTION: 0
ABDOMINAL PAIN: 1
DEPRESSION: 0
DIZZINESS: 1

## 2023-08-16 ASSESSMENT — FIBROSIS 4 INDEX: FIB4 SCORE: 2.97

## 2023-08-16 NOTE — ED TRIAGE NOTES
Tyrone Giraldo  63 y.o. male  Chief Complaint   Patient presents with    Syncope       Pt reports feeling dizzy while walking to the shower at home. Patient became diaphoretic and felt like he was going to pass out.     Pt BIB EMS for above complaint.     Pt is GCS 15, speaking in full sentences, follows commands and responds appropriately to questions. Resp are even and unlabored. Patient denies pain.     Pt educated on triage process. Pt encouraged to alert staff for any changes.       Vitals:    08/15/23 1724   BP: (P) 107/60   Pulse: (P) 97   Resp: (P) 16   Temp: (P) 36.4 °C (97.6 °F)

## 2023-08-16 NOTE — ED NOTES
Bedside report received from Mirtha JEONG. Pt resting comfortably and aware of POC with call light available and in reach. Pt on RA. Fall precautions in place and appropriate for pt. Pt reports no needs at this time. Appropriate equipment in room.

## 2023-08-16 NOTE — ED PROVIDER NOTES
ER Provider Note    Scribed for Zuhair Izaguirre M.d. by Sumit Carpenter. 8/15/2023  6:19 PM    Primary Care Provider: Pcp Pt States None    CHIEF COMPLAINT  Chief Complaint   Patient presents with    Syncope     EXTERNAL RECORDS REVIEWED  Outpatient Notes Patient was last seen in the ED on 5/14/2023 for evaluation of flu like sympotms and leg pain. He was diagnosed with Pneumonitis at that encounter.     HPI/ROS  LIMITATION TO HISTORY   Select: : None  OUTSIDE HISTORIAN(S):  None    Tyrone Giraldo is a 63 y.o. male who presents to the ED via EMS for evaluation of near syncope onset prior to arrival today. Patient states that he stood up to take a shower, but when walking across the room patient began to feel weak and nauseous, and like he may pass out. He then sat down, and notes that he became diaphoretic. Due to concern, he called a friend, who arrived at his house and called EMS after noticing that the patient was very pale and ill appearing. Patient did not lose consciousness today, but states that his other symptoms lasted for twenty minutes before coming into the hospital today. Patient report that EMS informed him that he was very hypotensive upon arrival, and his initial blood pressure was 75/50 and 85/59. Patient denies any vomiting, decreased PO intake, or chest pain. He denies any history of similar symptoms. Patient has a history of two prior myocardial infarctions, and has two stents (2006 and 2009), for which he received treatment at [facility]. Patient does not currently see cardiology, but used to. He denies any history of cardiac surgery, and denies any history of Afib or CH. Patient has an additional medical history of sciatica and coronary artery disease, but denies any other previous surgeries. No history of strokes. Patient admits to smoking tobacco, and his intermittently quit, but estimates about 47 years of smoking history. Tyrone smoked 3 packs per day in his 20's, but estimates smoking  about 3 cigarettes per day as of recent. Patient denies any alcohol use, but smokes marijuana regularly for pain management and anxiety. Patient reports a recent infection of pneumonia.     PAST MEDICAL HISTORY  Past Medical History:   Diagnosis Date    CAD (coronary artery disease)     MI (myocardial infarction) (HCC)     x2    Sciatica        SURGICAL HISTORY  Past Surgical History:   Procedure Laterality Date    OTHER CARDIAC SURGERY      stent    VASECTOMY         FAMILY HISTORY  History reviewed. No pertinent family history.    SOCIAL HISTORY   reports that he has been smoking cigarettes. He has never used smokeless tobacco. He reports current alcohol use. He reports current drug use. Drug: Inhaled.    CURRENT MEDICATIONS  Previous Medications    ALBUTEROL 108 (90 BASE) MCG/ACT AERO SOLN INHALATION AEROSOL    Inhale 2 Puffs every 6 hours as needed for Shortness of Breath.    ASPIRIN 325 MG TABS    Take 325 mg by mouth every day. Took 3 at 0130 on 9-12-09    BENZONATATE (TESSALON) 100 MG CAP    Take 1 Capsule by mouth 3 times a day as needed for Cough.    DAILY VITAMIN PO    Take  by mouth every day.       ALLERGIES  Cortisone and Morphine    PHYSICAL EXAM  /60   Pulse 85   Temp 36.4 °C (97.6 °F)   Resp 18   Ht 1.829 m (6')   Wt 81.6 kg (180 lb)   SpO2 95%   BMI 24.41 kg/m²   Gen: Alert, resting comfortably and in no acute distress  HEENT: ATNC, dry mucous membranes  Eyes: PERRL, EOMI, normal conjunctiva.   Neck: trachea midline  Resp: no respiratory distress, clear to auscultation bilaterally  CV: No JVD, Rhythm is regular without any murmurs, gallops, or rubs.  Abd: non-distended and non tender.  Ext: No deformities, no pedal edema bilaterally.  No calf tenderness.  Psych: normal mood  Neuro: speech fluent A&O x 4 and cranial nerves II-XII grossly intact.  NIHSS: 0    DIAGNOSTIC STUDIES    Labs:   Results for orders placed or performed during the hospital encounter of 08/15/23   CBC WITH  DIFFERENTIAL   Result Value Ref Range    WBC 5.2 4.8 - 10.8 K/uL    RBC 3.59 (L) 4.70 - 6.10 M/uL    Hemoglobin 10.8 (L) 14.0 - 18.0 g/dL    Hematocrit 33.7 (L) 42.0 - 52.0 %    MCV 93.9 81.4 - 97.8 fL    MCH 30.1 27.0 - 33.0 pg    MCHC 32.0 (L) 32.3 - 36.5 g/dL    RDW 46.1 35.9 - 50.0 fL    Platelet Count 106 (L) 164 - 446 K/uL    MPV 12.6 9.0 - 12.9 fL    Neutrophils-Polys 70.90 44.00 - 72.00 %    Lymphocytes 18.50 (L) 22.00 - 41.00 %    Monocytes 7.70 0.00 - 13.40 %    Eosinophils 1.50 0.00 - 6.90 %    Basophils 0.80 0.00 - 1.80 %    Immature Granulocytes 0.60 0.00 - 0.90 %    Nucleated RBC 0.00 0.00 - 0.20 /100 WBC    Neutrophils (Absolute) 3.68 1.82 - 7.42 K/uL    Lymphs (Absolute) 0.96 (L) 1.00 - 4.80 K/uL    Monos (Absolute) 0.40 0.00 - 0.85 K/uL    Eos (Absolute) 0.08 0.00 - 0.51 K/uL    Baso (Absolute) 0.04 0.00 - 0.12 K/uL    Immature Granulocytes (abs) 0.03 0.00 - 0.11 K/uL    NRBC (Absolute) 0.00 K/uL   COMP METABOLIC PANEL   Result Value Ref Range    Sodium 139 135 - 145 mmol/L    Potassium 3.9 3.6 - 5.5 mmol/L    Chloride 106 96 - 112 mmol/L    Co2 25 20 - 33 mmol/L    Anion Gap 8.0 7.0 - 16.0    Glucose 169 (H) 65 - 99 mg/dL    Bun 22 8 - 22 mg/dL    Creatinine 1.19 0.50 - 1.40 mg/dL    Calcium 8.2 (L) 8.5 - 10.5 mg/dL    Correct Calcium 8.9 8.5 - 10.5 mg/dL    AST(SGOT) 26 12 - 45 U/L    ALT(SGPT) 28 2 - 50 U/L    Alkaline Phosphatase 59 30 - 99 U/L    Total Bilirubin 0.6 0.1 - 1.5 mg/dL    Albumin 3.1 (L) 3.2 - 4.9 g/dL    Total Protein 5.5 (L) 6.0 - 8.2 g/dL    Globulin 2.4 1.9 - 3.5 g/dL    A-G Ratio 1.3 g/dL   TROPONIN   Result Value Ref Range    Troponin T 7 6 - 19 ng/L   TROPONIN   Result Value Ref Range    Troponin T 9 6 - 19 ng/L   ESTIMATED GFR   Result Value Ref Range    GFR (CKD-EPI) 68 >60 mL/min/1.73 m 2   EKG (NOW)   Result Value Ref Range    Report       [facility] Emergency Dept.    Test Date:  2023-08-15  Pt Name:    GABRIEL FINE                 Department:  ER  MRN:        [id number]                      Room:       RD 10  Gender:     Male                         Technician: [id number]  :        1960                   Requested By:ER TRIAGE PROTOCOL  Order #:    [id number]                    Reading MD: Zuhair Izaguirre    Measurements  Intervals                                Axis  Rate:       88                           P:          70  CO:         142                          QRS:        19  QRSD:       92                           T:          60  QT:         403  QTc:        488    Interpretive Statements  Sinus rhythm  Probable left atrial enlargement  Consider RVH or posterior infarct  Inferior infarct, old  Abnormal lateral Q waves  Compared to ECG 2023 11:20:30  Myocardial infarct finding still present  Electronically Signed On 08- 18:33:47 PDT by Zuhair Izaguirre          EKG:   I have independently interpreted this EKG  12 Lead EKG: interpreted by me as above.      Radiology:   The attending emergency physician has independently interpreted the diagnostic imaging associated with this visit and am waiting the final reading from the radiologist.   Preliminary interpretation is a follows: Chest x-ray: No pneumonia  Radiologist interpretation:   DX-CHEST-PORTABLE (1 VIEW)   Final Result      No acute cardiopulmonary disease evident.           COURSE & MEDICAL DECISION MAKING     ED Observation Status? Yes; I am placing the patient in to an observation status due to a diagnostic uncertainty as well as therapeutic intensity. Patient placed in observation status at 6:35 PM, 8/15/2023.     Observation plan is as follows: Monitor patient's blood pressure and evaluate with lab work.     Upon Reevaluation, the patient's condition has: Improved; and will be discharged.    Patient discharged from ED Observation status at 8:53 PM  (Time) 8/15/2023  (Date).     INITIAL ASSESSMENT, COURSE AND PLAN  Care Narrative: Patient presents with symptoms concerning for near syncope.   This appears to be most likely orthostatic in nature given the patient has dry mucous membranes, was triggered by change in position with ambulation.  He has no stigmata of DVT/PE.. There are no high risk syncope features, including family history of sudden death or drowning, murmur, recurrent syncope, EKG suggestive of ARVD, long QT, short QT, HOCM, pre-excitation, heart block, active ischemia or Brugada syndrome.  Serial troponins negative for ACS.  Pt was able to ambulate without orthostatic symptoms.       Patient was notified of his elevated glucose.  This does not meet criteria for diagnosis of diabetes.  Likely due to his significance soda intake.  They will be referred to their doctor for follow up.    6:28 PM - Patient seen and examined at bedside. Patient is a 63 year old male who presents today for evaluation of near syncopal episode onset prior to arrival today. They have been experiencing associated weakness, nausea, and paleness, but denies any chest pain. See HPI for further details. Discussed plan of care, including evaluation with lab work and EKG. Patient agrees to the plan of care. Ordered troponin, CMP, CBC with differential, and DX-Chest for evaluation. Patient will be medicated with an LR infusion.     HYDRATION: Based on the patient's presentation of Other Decreased PO intake  the patient was given IV fluids. IV Hydration was used because oral hydration was not adequate alone. Upon recheck following hydration, the patient was feeling improved.     8:53 PM - I reevaluated the patient at bedside. The patient informs me they feel improved following fluid administration. I discussed plan for discharge and follow up as outlined below. The patient is stable for discharge at this time and will return for any new or worsening symptoms. Patient verbalizes understanding and support with my plan for discharge.      HTN/IDDM FOLLOW UP:  The patient is referred to a primary physician for blood pressure  management, diabetic screening, and for all other preventive health concerns        DISPOSITION AND DISCUSSIONS  I have discussed management of the patient with the following physicians and MANDA's:  None.    Discussion of management with other QHP or appropriate source(s): None     Escalation of care considered, and ultimately not performed: acute inpatient care management, however at this time, the patient is most appropriate for outpatient management.    Barriers to care at this time, including but not limited to: Patient does not have established PCP.     Decision tools and prescription drugs considered including, but not limited to: HEART Score 3 .     The patient will return for new or worsening symptoms and is stable at the time of discharge.    DISPOSITION:  Patient will be discharged home in stable condition.    FOLLOW UP:  [facility], Emergency Dept  [address] [place] [id number] [phone]    If symptoms worsen    To establish a primary care provider within our system, please call [phone]            FINAL DIANGOSIS  1. Near syncope          The note accurately reflects work and decisions made by me.  Zuhair Izaguirre M.D.  8/17/2023  12:58 PM     Sumit TOSCANO (Anay), am scribing for, and in the presence of, Zuhair Izaguirre M.D..    Electronically signed by: Sumit Carpenter (Anay), 8/15/2023    Zuhair TOSCANO M.D. personally performed the services described in this documentation, as scribed by Sumit Carpenter in my presence, and it is both accurate and complete.

## 2023-08-16 NOTE — ED NOTES
Patient to room from lobby in wheelchair. Changed into gown, connected to monitor. Agree with triage note. Charted for ERP. Call light within reach.

## 2023-08-16 NOTE — DISCHARGE INSTRUCTIONS
You were seen in the ED for near syncope (near loss of consciousness). Your medical evaluation, physical exam and EKG were reassuring. At this time, the cause of your syncope does not appear to be dangerous. Please drink plenty of fluids and eat regular meals. Please take care when getting up from sitting or standing.   Please return to the ED or seek medical attention if you develop:  - Chest pain  - Shortness of breath  - Loss of consciousness  - Other concerning findings  Please follow up with your regular doctor.

## 2023-08-16 NOTE — ED NOTES
Pt discharged to home. Pt was given follow up instructions. Pt verbalized understanding of all instructions for discharge and is ambulatory out of ED with steady gait. Aox4

## 2023-08-16 NOTE — ED PROVIDER NOTES
"  ER Provider Note    Scribed for Peggy Ireland M.d. by Carlos Valentino. 8/16/2023  3:32 PM    Primary Care Provider: Pcp Pt States None    CHIEF COMPLAINT  Chief Complaint   Patient presents with    Bloody Stools     Woke up this morning noticed \"black/bloody stool\" -daily thinners -daily nsaids. Denies hx of stomach ulcer. Noted pale in triage.     Nausea     This am. Denies vomiting    Weakness     Generalized weakness since this am.     EXTERNAL RECORDS REVIEWED  Other patient was seen here in the ER May 14, 2023 with complaint of cough and shortness of breath.  He has history of myocardial infarction secondary to in-stent thrombosis as well as dyslipidemia, tobacco use, and history of methamphetamine abuse.  He had a CT scan done at that time which was negative for PE.    Patient was seen here in the ER yesterday complaining of near syncope.  EMS reported that he was hypotensive upon arrival with initial blood pressure of 75/50 and then 85/59.    HPI/ROS  LIMITATION TO HISTORY   Select: : None    OUTSIDE HISTORIAN(S):  None noted.    Tyrone Giraldo is a 63 y.o. male who presents to the ED for evaluation of bloody stools onset this morning. He reports that he presented to the ED yesterday for evaluation of feeling faint, but was not having bloody stools at this time. Then when he woke up this morning his stools were black with small amounts of red blood, prompting him to present to the ED. He denies any history of bloody stools or ulcers but indicates a history of coronary artery disease and two heart attacks in 2006 and 2009. He states that he is experiencing abdominal pain and \"heartburn\" that is different from his heart attacks with associated dizziness and diaphoresis. He denies any vomiting. The patient states that he takes aspirin daily, regularly consumes soda, and eats spicy foods including yesterday but denies any alcohol use or taking NSAIDs regularly. He did not take Pepto this morning. He " states that he is not currently taking medication for blood pressure or cardiac issues. He also denies any history of hypertension or hyperlipidemia.     PAST MEDICAL HISTORY  Past Medical History:   Diagnosis Date    CAD (coronary artery disease)     MI (myocardial infarction) (HCC)     x2    Sciatica        SURGICAL HISTORY  Past Surgical History:   Procedure Laterality Date    OTHER CARDIAC SURGERY      stent    VASECTOMY         FAMILY HISTORY  No family history noted.    SOCIAL HISTORY   reports that he has been smoking cigarettes. He has never used smokeless tobacco. He reports current alcohol use. He reports current drug use. Drug: Inhaled.    CURRENT MEDICATIONS  Previous Medications    ASPIRIN 325 MG TABS    Take 325 mg by mouth every day.    DAILY VITAMIN PO    Take 1 Tablet by mouth every day.       ALLERGIES  Cortisone and Morphine    PHYSICAL EXAM  /66   Pulse 90   Temp 36.3 °C (97.4 °F) (Temporal)   Resp 14   Ht 1.829 m (6')   Wt 81.6 kg (180 lb)   SpO2 99%   BMI 24.41 kg/m²   Constitutional: Ill appearing, Diaphoretic, Well developed, well nourished; mild distress.  Appears slightly anxious.  HENT: Normocephalic, atraumatic; Bilateral external ears normal; slightly dry mucous membranes.  Eyes: PERRL, EOMI, Conjunctiva normal. No discharge.   Neck:  Supple, nontender midline; No stridor; No nuchal rigidity.   Lymphatic: No cervical lymphadenopathy noted.   Cardiovascular: Regular rate and rhythm without murmurs, rubs, or gallop.   Thorax & Lungs: No respiratory distress, breath sounds clear to auscultation bilaterally without wheezing, rales or rhonchi. Nontender chest wall. No crepitus or subcutaneous air  Abdomen: Soft, Upper abdomen tenderness, bowel sounds normal. No obvious masses; No pulsatile masses; no rebound, guarding, or peritoneal signs.   Skin: Good color, Warm, Diaphoretic, without rash or petechia.  Back: Nontender, No CVA tenderness.   Rectal: Normal appearance, Normal  sphincter tone. Stool is blackish mixed with dark red blood, Guaiac positive.   Extremities: Distal radial, dorsalis pedis, posterior tibial pulses are equal bilaterally; No edema; Nontender calves or saphenous, No cyanosis, No clubbing.   Musculoskeletal: Good range of motion in all major joints. No tenderness to palpation or major deformities noted.   Neurologic: Alert & oriented x 4, clear speech.      DIAGNOSTIC STUDIES    Labs:   Results for orders placed or performed during the hospital encounter of 08/16/23   COD (ADULT)   Result Value Ref Range    ABO Grouping Only A     Rh Grouping Only POS     Antibody Screen-Cod NEG    CBC WITH DIFFERENTIAL   Result Value Ref Range    WBC 7.1 4.8 - 10.8 K/uL    RBC 3.84 (L) 4.70 - 6.10 M/uL    Hemoglobin 11.4 (L) 14.0 - 18.0 g/dL    Hematocrit 34.7 (L) 42.0 - 52.0 %    MCV 90.4 81.4 - 97.8 fL    MCH 29.7 27.0 - 33.0 pg    MCHC 32.9 32.3 - 36.5 g/dL    RDW 45.3 35.9 - 50.0 fL    Platelet Count 154 (L) 164 - 446 K/uL    MPV 12.2 9.0 - 12.9 fL    Neutrophils-Polys 71.50 44.00 - 72.00 %    Lymphocytes 19.90 (L) 22.00 - 41.00 %    Monocytes 5.80 0.00 - 13.40 %    Eosinophils 1.40 0.00 - 6.90 %    Basophils 1.00 0.00 - 1.80 %    Immature Granulocytes 0.40 0.00 - 0.90 %    Nucleated RBC 0.00 0.00 - 0.20 /100 WBC    Neutrophils (Absolute) 5.09 1.82 - 7.42 K/uL    Lymphs (Absolute) 1.42 1.00 - 4.80 K/uL    Monos (Absolute) 0.41 0.00 - 0.85 K/uL    Eos (Absolute) 0.10 0.00 - 0.51 K/uL    Baso (Absolute) 0.07 0.00 - 0.12 K/uL    Immature Granulocytes (abs) 0.03 0.00 - 0.11 K/uL    NRBC (Absolute) 0.00 K/uL   COMP METABOLIC PANEL   Result Value Ref Range    Sodium 141 135 - 145 mmol/L    Potassium 4.0 3.6 - 5.5 mmol/L    Chloride 107 96 - 112 mmol/L    Co2 23 20 - 33 mmol/L    Anion Gap 11.0 7.0 - 16.0    Glucose 127 (H) 65 - 99 mg/dL    Bun 31 (H) 8 - 22 mg/dL    Creatinine 0.73 0.50 - 1.40 mg/dL    Calcium 8.8 8.5 - 10.5 mg/dL    Correct Calcium 9.1 8.5 - 10.5 mg/dL    AST(SGOT) 49  (H) 12 - 45 U/L    ALT(SGPT) 41 2 - 50 U/L    Alkaline Phosphatase 67 30 - 99 U/L    Total Bilirubin 0.8 0.1 - 1.5 mg/dL    Albumin 3.6 3.2 - 4.9 g/dL    Total Protein 6.4 6.0 - 8.2 g/dL    Globulin 2.8 1.9 - 3.5 g/dL    A-G Ratio 1.3 g/dL   LIPASE   Result Value Ref Range    Lipase 16 11 - 82 U/L   PROTHROMBIN TIME   Result Value Ref Range    PT 14.8 (H) 12.0 - 14.6 sec    INR 1.17 (H) 0.87 - 1.13   APTT   Result Value Ref Range    APTT 28.9 24.7 - 36.0 sec   ABO Rh Confirm   Result Value Ref Range    ABO Rh Confirm A POS    ESTIMATED GFR   Result Value Ref Range    GFR (CKD-EPI) 101 >60 mL/min/1.73 m 2   TROPONIN   Result Value Ref Range    Troponin T 8 6 - 19 ng/L   PLATELET MAPPING WITH BASIC TEG   Result Value Ref Range    Reaction Time Initial-R 5.2 4.6 - 9.1 min    React Time Initial Hep 5.0 4.3 - 8.3 min    Clot Kinetics-K 1.3 0.8 - 2.1 min    Clot Angle-Angle 71.4 63.0 - 78.0 degrees    Maximum Clot Strength-MA 55.0 52.0 - 69.0 mm    TEG Functional Fibrinogen(MA) 22.8 15.0 - 32.0 mm    Lysis 30 minutes-LY30 3.4 (H) 0.0 - 2.6 %    % Inhibition ADP 0.0 0.0 - 17.0 %    % Inhibition AA 0.0 0.0 - 11.0 %    TEG Algorithm Link Algorithm    EKG   Result Value Ref Range    Report       [facility] Emergency Dept.    Test Date:  2023  Pt Name:    GABRIEL FINE                 Department: ER  MRN:        [id number]                      Room:        23  Gender:     Male                         Technician: [id number]  :        1960                   Requested By:PEGGY IRELAND  Order #:    [id number]                    Reading MD: Peggy Ireland    Measurements  Intervals                                Axis  Rate:       85                           P:          65  MI:         136                          QRS:        24  QRSD:       85                           T:          65  QT:         383  QTc:        456    Interpretive Statements  Sinus rhythm rate 85  Normal axis  Normal intervals  Minimal ST  depression V3-V5  No ST elevation  T waves flat I, AVL, V2  Atrial premature complexes  Left atrial enlargement  Abnormal R-wave progression, early transition  Minimal ST depression, anterior leads  Compared to ECG 08/15/2023 17:29:47    Electronically Sign ed On 08- 23:07:37 PDT by Peggy Ireland           EKG:   I have independently interpreted this EKG as indicated above.     Radiology:   The attending emergency physician has independently interpreted the diagnostic imaging associated with this visit and am waiting the final reading from the radiologist.     Preliminary interpretation is a follows: ER MD is reviewed the patient's chest x-ray.  No obvious infiltrates.    Radiologist interpretation:   CTA ABDOMEN PELVIS W & W/O POST PROCESS   Final Result      1.  No evidence of active arterial gastrointestinal hemorrhage.   2.  Likely enteritis.   3.  Cirrhosis with 4.6 cm hepatic mass, likely HCC. Recommend nonemergent MRI hepatic protocol.   4.  Atherosclerotic changes. No aneurysm or dissection.   5.  Punctate, nonobstructing left renal stone.   6.  Cholelithiasis.      Study unavailable for interpretation until 8/16/2023 5:59 PM due to unknown issue with hospital PACS system.      DX-CHEST-PORTABLE (1 VIEW)   Final Result      No acute cardiac or pulmonary abnormalities are identified.      MR-ABDOMEN-WITH & W/O    (Results Pending)         COURSE & MEDICAL DECISION MAKING     ED Observation Status? No; Patient does not meet criteria for ED Observation.     INITIAL ASSESSMENT, COURSE AND PLAN  Care Narrative: Patient presents to the ER complaining of black stool which started this morning.  Patient was seen here in the ER yesterday for near syncope.  He was found to have hypotension on scene by EMS.  After work-up here in the ER he was ultimately discharged.  He said that yesterday he was not experiencing any black stool.  This morning when he woke up he had several black tarry stools.  On my examination  "here in the ER the stool is black but there is also a little bit of dark red blood mixed within it.  This may be concern for a fairly rapid upper GI bleed.  Patient describes a little bit of \"heartburn\" which she said felt different from his cardiac pain.  He also had some mild tenderness in the midepigastrium on examination.  The patient has not had any vomiting although he does report some nausea.  He drinks caffeinated beverages and takes a daily aspirin for his heart disease, but otherwise no NSAID use, no alcohol use, and no blood thinners other than the aspirin.  Patient has never had GI bleed before.  No history of ulcers in the past as far as he knows.  He has never had endoscopy or colonoscopy however and has never seen a gastroenterologist.  Patient's hemoglobin here in the ER today is 11.4.  It was actually a little lower yesterday.  BUN has risen to 31.  It was in the low 20s yesterday.  At this time I am concerned that patient is having an upper GI bleed.  I am concerned he might be brisk since there is some dark red component to  the melena.  Patient was given a bolus of Protonix as well as prophylactic antibiotics.  I spoke to Dr. Zendejas, gastroenterologist on-call.  He will scope the patient in the morning.  He recommends getting an MRI scan of the abdomen to evaluate the liver tonight in preparation for further GI work-up in the morning.  At this time patient is hemodynamically stable.  He has not been hypotensive or tachycardic here in the ER.  He does not need blood transfusion at this time.  However, serial hemoglobin testing will be important to be sure patient is not precipitously dropping his hemoglobin.  TEG was added to the patient's blood work upon arrival.  He was diaphoretic upon arrival complaining of heartburn, so I did do an EKG and a troponin.  Troponin is normal at 8.  EKG is nonacute without any ST changes to suggest MI.  Patient will need to be hospitalized.  I spoke with the " [facility] internal medicine resident on-call and he has kindly come to the ER to see the patient.  He is aware of the gastroenterologist recommendations.  At this time further evaluation management done by internal medicine and gastroenterology.    3:32 PM - Patient seen and examined at bedside. Discussed plan of care, including rectal examination and probable hospitalization. Patient agrees to the plan of care. The patient will be resuscitated with 1L NS IV and medicated with Protonix 8 mg IV. Ordered for imaging and labs to evaluate his symptoms.     6:25 PM Paged Hospitalist.    6:44 PM  I discussed the patient's case and the above findings with [facility] IM resident Dr. Alves for Dr. Olsen (Hospitalist) who agrees to evaluate the patient for hospitalization.    7:00 PM - I discussed the patient's case and the above findings with Dr. Zendejas (Gastroenterology) who wanted the patient to receive an MRI of the liver tonight. He would also like the patient started on PPI and antibiotics. He will plan to scope the patient in the morning and will consult prior.      HYDRATION: Based on the patient's presentation of GI Bleed / Upset the patient was given IV fluids. IV Hydration was used because oral hydration was not adequate alone. Upon recheck following hydration, the patient was improved.  ADDITIONAL PROBLEM LIST    Problem #1: Black stool today  Problem #2: Dizziness and lightheadedness with near syncope yesterday    DISPOSITION AND DISCUSSIONS  I have discussed management of the patient with the following physicians and MANDA's:  [facility] IM resident Dr. Alves for Dr. Olsen (Hospitalist), Dr. Zendejas (GI)    Discussion of management with other [facility] or appropriate source(s): None     Escalation of care considered, and ultimately not performed: Patient does not need blood transfusion at this time as hemoglobin is above threshold needed for transfusion.    Barriers to care at this time, including but not limited to: Patient does  not have established PCP.     Decision tools and prescription drugs considered including, but not limited to: Antibiotics patient was given prophylactic antibiotics here in the ER .    CRITICAL CARE  The very real possibilty of a deterioration of this patient's condition required the highest level of my preparedness for sudden, emergent intervention.  I provided critical care services, which included medication orders, frequent reevaluations of the patient's condition and response to treatment, ordering and reviewing test results, and discussing the case with various consultants.  The critical care time associated with the care of the patient was 35 minutes. Review chart for interventions. This time is exclusive of any other billable procedures.      DISPOSITION:  Patient will be hospitalized by R  Resident team in guarded condition.    FINAL DIAGNOSIS  1. Gastrointestinal hemorrhage, unspecified gastrointestinal hemorrhage type Acute   2. Anemia, unspecified type Acute     The critical care time associated with the care of the patient was 35 minutes.     Carlos TOSCANO (Scribe), am scribing for, and in the presence of, Peggy Ireland M.d..    Electronically signed by: Carlos Valentino (Scribe), 8/16/2023    Peggy TOSCANO M.d. personally performed the services described in this documentation, as scribed by Carlos Valentino in my presence, and it is both accurate and complete.     This dictation has been created using voice recognition software. The accuracy of the dictation is limited by the abilities of the software. I expect there may be some errors of grammar and possibly content. I made every attempt to manually correct the errors within my dictation. However, errors related to voice recognition software may still exist and should be interpreted within the appropriate context.     The note accurately reflects work and decisions made by me.  Peggy Ireland M.D.  8/16/2023  10:54 PM

## 2023-08-16 NOTE — ED NOTES
ERP at bedside for occult blood test with this staff member present. Test was positive. Pt denies further needs at this time. Call light within reach.

## 2023-08-16 NOTE — ED TRIAGE NOTES
"Chief Complaint   Patient presents with    Bloody Stools     Woke up this morning noticed \"black/bloody stool\" -daily thinners -daily nsaids. Denies hx of stomach ulcer. Noted pale in triage.     Nausea     This am. Denies vomiting    Weakness     Generalized weakness since this am.     Vitals:    08/16/23 1320   BP: 122/66   Pulse: 90   Resp: 14   Temp: 36.3 °C (97.4 °F)   SpO2: 99%     Denies abdominal pain. Educated on triage process. Instructed to notify staff for any worsening symptoms. Denies any recent travel. Denies exposure to known covid positive patients. Denies any respiratory symptoms.    "

## 2023-08-17 ENCOUNTER — ANESTHESIA (OUTPATIENT)
Dept: SURGERY | Facility: MEDICAL CENTER | Age: 63
DRG: 432 | End: 2023-08-17
Payer: MEDICARE

## 2023-08-17 ENCOUNTER — ANESTHESIA EVENT (OUTPATIENT)
Dept: SURGERY | Facility: MEDICAL CENTER | Age: 63
DRG: 432 | End: 2023-08-17
Payer: MEDICARE

## 2023-08-17 PROBLEM — D62 ACUTE BLOOD LOSS ANEMIA: Status: ACTIVE | Noted: 2023-08-16

## 2023-08-17 PROBLEM — R16.0 LIVER MASS: Status: ACTIVE | Noted: 2023-08-17

## 2023-08-17 LAB
ALBUMIN SERPL BCP-MCNC: 3 G/DL (ref 3.2–4.9)
ALBUMIN/GLOB SERPL: 1.2 G/DL
ALP SERPL-CCNC: 53 U/L (ref 30–99)
ALT SERPL-CCNC: 42 U/L (ref 2–50)
ANION GAP SERPL CALC-SCNC: 10 MMOL/L (ref 7–16)
AST SERPL-CCNC: 46 U/L (ref 12–45)
BASOPHILS # BLD AUTO: 0.8 % (ref 0–1.8)
BASOPHILS # BLD: 0.05 K/UL (ref 0–0.12)
BILIRUB SERPL-MCNC: 0.5 MG/DL (ref 0.1–1.5)
BUN SERPL-MCNC: 30 MG/DL (ref 8–22)
CALCIUM ALBUM COR SERPL-MCNC: 9 MG/DL (ref 8.5–10.5)
CALCIUM SERPL-MCNC: 8.2 MG/DL (ref 8.5–10.5)
CHLORIDE SERPL-SCNC: 108 MMOL/L (ref 96–112)
CHOLEST SERPL-MCNC: 69 MG/DL (ref 100–199)
CO2 SERPL-SCNC: 21 MMOL/L (ref 20–33)
CREAT SERPL-MCNC: 0.73 MG/DL (ref 0.5–1.4)
EOSINOPHIL # BLD AUTO: 0.1 K/UL (ref 0–0.51)
EOSINOPHIL NFR BLD: 1.5 % (ref 0–6.9)
ERYTHROCYTE [DISTWIDTH] IN BLOOD BY AUTOMATED COUNT: 46.8 FL (ref 35.9–50)
EST. AVERAGE GLUCOSE BLD GHB EST-MCNC: 140 MG/DL
GFR SERPLBLD CREATININE-BSD FMLA CKD-EPI: 101 ML/MIN/1.73 M 2
GLOBULIN SER CALC-MCNC: 2.5 G/DL (ref 1.9–3.5)
GLUCOSE SERPL-MCNC: 121 MG/DL (ref 65–99)
HBA1C MFR BLD: 6.5 % (ref 4–5.6)
HCT VFR BLD AUTO: 29.1 % (ref 42–52)
HDLC SERPL-MCNC: 17 MG/DL
HGB BLD-MCNC: 9.6 G/DL (ref 14–18)
IMM GRANULOCYTES # BLD AUTO: 0.01 K/UL (ref 0–0.11)
IMM GRANULOCYTES NFR BLD AUTO: 0.2 % (ref 0–0.9)
LDLC SERPL CALC-MCNC: 33 MG/DL
LYMPHOCYTES # BLD AUTO: 1.44 K/UL (ref 1–4.8)
LYMPHOCYTES NFR BLD: 21.9 % (ref 22–41)
MAGNESIUM SERPL-MCNC: 1.8 MG/DL (ref 1.5–2.5)
MCH RBC QN AUTO: 30.3 PG (ref 27–33)
MCHC RBC AUTO-ENTMCNC: 33 G/DL (ref 32.3–36.5)
MCV RBC AUTO: 91.8 FL (ref 81.4–97.8)
MONOCYTES # BLD AUTO: 0.42 K/UL (ref 0–0.85)
MONOCYTES NFR BLD AUTO: 6.4 % (ref 0–13.4)
NEUTROPHILS # BLD AUTO: 4.56 K/UL (ref 1.82–7.42)
NEUTROPHILS NFR BLD: 69.2 % (ref 44–72)
NRBC # BLD AUTO: 0 K/UL
NRBC BLD-RTO: 0 /100 WBC (ref 0–0.2)
PLATELET # BLD AUTO: 101 K/UL (ref 164–446)
PMV BLD AUTO: 12.7 FL (ref 9–12.9)
POTASSIUM SERPL-SCNC: 4.4 MMOL/L (ref 3.6–5.5)
PROT SERPL-MCNC: 5.5 G/DL (ref 6–8.2)
RBC # BLD AUTO: 3.17 M/UL (ref 4.7–6.1)
SODIUM SERPL-SCNC: 139 MMOL/L (ref 135–145)
TRIGL SERPL-MCNC: 95 MG/DL (ref 0–149)
WBC # BLD AUTO: 6.6 K/UL (ref 4.8–10.8)

## 2023-08-17 PROCEDURE — 160002 HCHG RECOVERY MINUTES (STAT): Performed by: INTERNAL MEDICINE

## 2023-08-17 PROCEDURE — C9113 INJ PANTOPRAZOLE SODIUM, VIA: HCPCS

## 2023-08-17 PROCEDURE — 96375 TX/PRO/DX INJ NEW DRUG ADDON: CPT

## 2023-08-17 PROCEDURE — 99232 SBSQ HOSP IP/OBS MODERATE 35: CPT | Performed by: STUDENT IN AN ORGANIZED HEALTH CARE EDUCATION/TRAINING PROGRAM

## 2023-08-17 PROCEDURE — 87522 HEPATITIS C REVRS TRNSCRPJ: CPT

## 2023-08-17 PROCEDURE — 160203 HCHG ENDO MINUTES - 1ST 30 MINS LEVEL 4: Performed by: INTERNAL MEDICINE

## 2023-08-17 PROCEDURE — 160208 HCHG ENDO MINUTES - EA ADDL 1 MIN LEVEL 4: Performed by: INTERNAL MEDICINE

## 2023-08-17 PROCEDURE — 160009 HCHG ANES TIME/MIN: Performed by: INTERNAL MEDICINE

## 2023-08-17 PROCEDURE — 700101 HCHG RX REV CODE 250

## 2023-08-17 PROCEDURE — 36415 COLL VENOUS BLD VENIPUNCTURE: CPT

## 2023-08-17 PROCEDURE — 700105 HCHG RX REV CODE 258: Mod: JZ | Performed by: ANESTHESIOLOGY

## 2023-08-17 PROCEDURE — 80074 ACUTE HEPATITIS PANEL: CPT

## 2023-08-17 PROCEDURE — 85025 COMPLETE CBC W/AUTO DIFF WBC: CPT

## 2023-08-17 PROCEDURE — 700101 HCHG RX REV CODE 250: Performed by: ANESTHESIOLOGY

## 2023-08-17 PROCEDURE — 80061 LIPID PANEL: CPT

## 2023-08-17 PROCEDURE — 770020 HCHG ROOM/CARE - TELE (206)

## 2023-08-17 PROCEDURE — 700111 HCHG RX REV CODE 636 W/ 250 OVERRIDE (IP): Mod: JA

## 2023-08-17 PROCEDURE — 06L38CZ OCCLUSION OF ESOPHAGEAL VEIN WITH EXTRALUMINAL DEVICE, VIA NATURAL OR ARTIFICIAL OPENING ENDOSCOPIC: ICD-10-PCS | Performed by: INTERNAL MEDICINE

## 2023-08-17 PROCEDURE — 83735 ASSAY OF MAGNESIUM: CPT

## 2023-08-17 PROCEDURE — 80053 COMPREHEN METABOLIC PANEL: CPT

## 2023-08-17 PROCEDURE — 700105 HCHG RX REV CODE 258: Mod: JZ

## 2023-08-17 PROCEDURE — 700111 HCHG RX REV CODE 636 W/ 250 OVERRIDE (IP): Performed by: ANESTHESIOLOGY

## 2023-08-17 PROCEDURE — 160035 HCHG PACU - 1ST 60 MINS PHASE I: Performed by: INTERNAL MEDICINE

## 2023-08-17 PROCEDURE — 83036 HEMOGLOBIN GLYCOSYLATED A1C: CPT

## 2023-08-17 PROCEDURE — 43244 EGD VARICES LIGATION: CPT | Performed by: INTERNAL MEDICINE

## 2023-08-17 PROCEDURE — 99222 1ST HOSP IP/OBS MODERATE 55: CPT | Mod: 25 | Performed by: INTERNAL MEDICINE

## 2023-08-17 PROCEDURE — 160048 HCHG OR STATISTICAL LEVEL 1-5: Performed by: INTERNAL MEDICINE

## 2023-08-17 RX ORDER — HYDROMORPHONE HYDROCHLORIDE 1 MG/ML
0.2 INJECTION, SOLUTION INTRAMUSCULAR; INTRAVENOUS; SUBCUTANEOUS
Status: DISCONTINUED | OUTPATIENT
Start: 2023-08-17 | End: 2023-08-17 | Stop reason: HOSPADM

## 2023-08-17 RX ORDER — ONDANSETRON 2 MG/ML
4 INJECTION INTRAMUSCULAR; INTRAVENOUS
Status: DISCONTINUED | OUTPATIENT
Start: 2023-08-17 | End: 2023-08-17 | Stop reason: HOSPADM

## 2023-08-17 RX ORDER — ONDANSETRON 2 MG/ML
INJECTION INTRAMUSCULAR; INTRAVENOUS PRN
Status: DISCONTINUED | OUTPATIENT
Start: 2023-08-17 | End: 2023-08-17 | Stop reason: SURG

## 2023-08-17 RX ORDER — HYDROMORPHONE HYDROCHLORIDE 1 MG/ML
0.1 INJECTION, SOLUTION INTRAMUSCULAR; INTRAVENOUS; SUBCUTANEOUS
Status: DISCONTINUED | OUTPATIENT
Start: 2023-08-17 | End: 2023-08-17 | Stop reason: HOSPADM

## 2023-08-17 RX ORDER — OXYCODONE HCL 5 MG/5 ML
5 SOLUTION, ORAL ORAL
Status: DISCONTINUED | OUTPATIENT
Start: 2023-08-17 | End: 2023-08-17 | Stop reason: HOSPADM

## 2023-08-17 RX ORDER — HYDROMORPHONE HYDROCHLORIDE 1 MG/ML
0.4 INJECTION, SOLUTION INTRAMUSCULAR; INTRAVENOUS; SUBCUTANEOUS
Status: DISCONTINUED | OUTPATIENT
Start: 2023-08-17 | End: 2023-08-17 | Stop reason: HOSPADM

## 2023-08-17 RX ORDER — LIDOCAINE HYDROCHLORIDE 20 MG/ML
INJECTION, SOLUTION EPIDURAL; INFILTRATION; INTRACAUDAL; PERINEURAL PRN
Status: DISCONTINUED | OUTPATIENT
Start: 2023-08-17 | End: 2023-08-17 | Stop reason: SURG

## 2023-08-17 RX ORDER — HALOPERIDOL 5 MG/ML
1 INJECTION INTRAMUSCULAR
Status: DISCONTINUED | OUTPATIENT
Start: 2023-08-17 | End: 2023-08-17 | Stop reason: HOSPADM

## 2023-08-17 RX ORDER — HYDRALAZINE HYDROCHLORIDE 20 MG/ML
5 INJECTION INTRAMUSCULAR; INTRAVENOUS
Status: DISCONTINUED | OUTPATIENT
Start: 2023-08-17 | End: 2023-08-17 | Stop reason: HOSPADM

## 2023-08-17 RX ORDER — EPHEDRINE SULFATE 50 MG/ML
5 INJECTION, SOLUTION INTRAVENOUS
Status: DISCONTINUED | OUTPATIENT
Start: 2023-08-17 | End: 2023-08-17 | Stop reason: HOSPADM

## 2023-08-17 RX ORDER — SODIUM CHLORIDE, SODIUM LACTATE, POTASSIUM CHLORIDE, CALCIUM CHLORIDE 600; 310; 30; 20 MG/100ML; MG/100ML; MG/100ML; MG/100ML
INJECTION, SOLUTION INTRAVENOUS CONTINUOUS
Status: DISCONTINUED | OUTPATIENT
Start: 2023-08-17 | End: 2023-08-17 | Stop reason: HOSPADM

## 2023-08-17 RX ORDER — DIPHENHYDRAMINE HYDROCHLORIDE 50 MG/ML
12.5 INJECTION INTRAMUSCULAR; INTRAVENOUS
Status: DISCONTINUED | OUTPATIENT
Start: 2023-08-17 | End: 2023-08-17 | Stop reason: HOSPADM

## 2023-08-17 RX ORDER — SODIUM CHLORIDE, SODIUM LACTATE, POTASSIUM CHLORIDE, CALCIUM CHLORIDE 600; 310; 30; 20 MG/100ML; MG/100ML; MG/100ML; MG/100ML
INJECTION, SOLUTION INTRAVENOUS
Status: DISCONTINUED | OUTPATIENT
Start: 2023-08-17 | End: 2023-08-17 | Stop reason: SURG

## 2023-08-17 RX ORDER — ROCURONIUM BROMIDE 10 MG/ML
INJECTION, SOLUTION INTRAVENOUS PRN
Status: DISCONTINUED | OUTPATIENT
Start: 2023-08-17 | End: 2023-08-17 | Stop reason: SURG

## 2023-08-17 RX ORDER — MEPERIDINE HYDROCHLORIDE 25 MG/ML
12.5 INJECTION INTRAMUSCULAR; INTRAVENOUS; SUBCUTANEOUS
Status: DISCONTINUED | OUTPATIENT
Start: 2023-08-17 | End: 2023-08-17 | Stop reason: HOSPADM

## 2023-08-17 RX ORDER — IPRATROPIUM BROMIDE AND ALBUTEROL SULFATE 2.5; .5 MG/3ML; MG/3ML
3 SOLUTION RESPIRATORY (INHALATION)
Status: DISCONTINUED | OUTPATIENT
Start: 2023-08-17 | End: 2023-08-17 | Stop reason: HOSPADM

## 2023-08-17 RX ORDER — OXYCODONE HCL 5 MG/5 ML
10 SOLUTION, ORAL ORAL
Status: DISCONTINUED | OUTPATIENT
Start: 2023-08-17 | End: 2023-08-17 | Stop reason: HOSPADM

## 2023-08-17 RX ADMIN — SODIUM CHLORIDE, POTASSIUM CHLORIDE, SODIUM LACTATE AND CALCIUM CHLORIDE: 600; 310; 30; 20 INJECTION, SOLUTION INTRAVENOUS at 10:15

## 2023-08-17 RX ADMIN — PANTOPRAZOLE SODIUM 40 MG: 40 INJECTION, POWDER, FOR SOLUTION INTRAVENOUS at 06:01

## 2023-08-17 RX ADMIN — LIDOCAINE HYDROCHLORIDE 80 MG: 20 INJECTION, SOLUTION EPIDURAL; INFILTRATION; INTRACAUDAL at 10:15

## 2023-08-17 RX ADMIN — PROPOFOL 150 MG: 10 INJECTION, EMULSION INTRAVENOUS at 10:15

## 2023-08-17 RX ADMIN — PANTOPRAZOLE SODIUM 40 MG: 40 INJECTION, POWDER, FOR SOLUTION INTRAVENOUS at 17:31

## 2023-08-17 RX ADMIN — CEFTRIAXONE SODIUM 2000 MG: 10 INJECTION, POWDER, FOR SOLUTION INTRAVENOUS at 17:31

## 2023-08-17 RX ADMIN — ROCURONIUM BROMIDE 50 MG: 50 INJECTION, SOLUTION INTRAVENOUS at 10:15

## 2023-08-17 RX ADMIN — SUGAMMADEX 200 MG: 100 INJECTION, SOLUTION INTRAVENOUS at 10:33

## 2023-08-17 RX ADMIN — ONDANSETRON 4 MG: 2 INJECTION INTRAMUSCULAR; INTRAVENOUS at 10:24

## 2023-08-17 RX ADMIN — OCTREOTIDE ACETATE 50 MCG/HR: 200 INJECTION, SOLUTION INTRAVENOUS; SUBCUTANEOUS at 14:16

## 2023-08-17 RX ADMIN — SODIUM CHLORIDE: 9 INJECTION, SOLUTION INTRAVENOUS at 15:40

## 2023-08-17 RX ADMIN — ONDANSETRON 4 MG: 2 INJECTION INTRAMUSCULAR; INTRAVENOUS at 10:33

## 2023-08-17 ASSESSMENT — ENCOUNTER SYMPTOMS
FALLS: 0
SHORTNESS OF BREATH: 0
HEADACHES: 0
INSOMNIA: 0
CHILLS: 0
WEAKNESS: 1
NAUSEA: 0
PALPITATIONS: 0
VOMITING: 0
BLOOD IN STOOL: 1
DIZZINESS: 0
ABDOMINAL PAIN: 0
BACK PAIN: 0
CONSTIPATION: 0
EYE PAIN: 0
FEVER: 0
DIARRHEA: 0
SENSORY CHANGE: 0
COUGH: 0
FOCAL WEAKNESS: 0
BLURRED VISION: 0

## 2023-08-17 ASSESSMENT — LIFESTYLE VARIABLES
CONSUMPTION TOTAL: NEGATIVE
ON A TYPICAL DAY WHEN YOU DRINK ALCOHOL HOW MANY DRINKS DO YOU HAVE: 0
HAVE PEOPLE ANNOYED YOU BY CRITICIZING YOUR DRINKING: NO
DOES PATIENT WANT TO STOP DRINKING: NO
SUBSTANCE_ABUSE: 0
EVER HAD A DRINK FIRST THING IN THE MORNING TO STEADY YOUR NERVES TO GET RID OF A HANGOVER: NO
AVERAGE NUMBER OF DAYS PER WEEK YOU HAVE A DRINK CONTAINING ALCOHOL: 0
EVER FELT BAD OR GUILTY ABOUT YOUR DRINKING: NO
TOTAL SCORE: 0
TOTAL SCORE: 0
HOW MANY TIMES IN THE PAST YEAR HAVE YOU HAD 5 OR MORE DRINKS IN A DAY: 0
ALCOHOL_USE: NO
TOTAL SCORE: 0
HAVE YOU EVER FELT YOU SHOULD CUT DOWN ON YOUR DRINKING: NO

## 2023-08-17 ASSESSMENT — PAIN DESCRIPTION - PAIN TYPE
TYPE: SURGICAL PAIN

## 2023-08-17 ASSESSMENT — COGNITIVE AND FUNCTIONAL STATUS - GENERAL
MOBILITY SCORE: 20
SUGGESTED CMS G CODE MODIFIER MOBILITY: CJ
MOVING FROM LYING ON BACK TO SITTING ON SIDE OF FLAT BED: A LITTLE
DAILY ACTIVITIY SCORE: 24
WALKING IN HOSPITAL ROOM: A LITTLE
STANDING UP FROM CHAIR USING ARMS: A LITTLE
SUGGESTED CMS G CODE MODIFIER DAILY ACTIVITY: CH
CLIMB 3 TO 5 STEPS WITH RAILING: A LITTLE

## 2023-08-17 ASSESSMENT — FIBROSIS 4 INDEX
FIB4 SCORE: 4.5
FIB4 SCORE: 4.5

## 2023-08-17 ASSESSMENT — PAIN SCALES - GENERAL: PAIN_LEVEL: 0

## 2023-08-17 ASSESSMENT — PATIENT HEALTH QUESTIONNAIRE - PHQ9
1. LITTLE INTEREST OR PLEASURE IN DOING THINGS: NOT AT ALL
SUM OF ALL RESPONSES TO PHQ9 QUESTIONS 1 AND 2: 0
2. FEELING DOWN, DEPRESSED, IRRITABLE, OR HOPELESS: NOT AT ALL

## 2023-08-17 NOTE — CONSULTS
Date of Consultation:  8/17/2023    Patient: : Tyrone Giraldo  MRN: [id number]    Referring Physician:  Dr. Peggy Odell      GI:Dixon Zendejas M.D.     Reason for Consultation: GO bleedomg     History of Present Illness:   The patient is a 63 years old gentleman with medical history of coronary artery disease, myocardial infarction, on full dose aspirin, no significant GI medical history presented to inpatient GI service for possible GI bleeding.    The patient VISITED OUR EMERGENCY 16 for vague discomfort in the near syncope, hypotension was founded which responds to fluid support rapidly and the patient was discharged home after grossly exam.  The patient came back to the emergency for possible blood in the stool later today, he continues to have vague symptoms of abdomen, and that he feels tired.  No vomiting per the patient report nausea and some epigastric discomfort.  The patient denies alcohol use, NSAID use.  The patient has no medical history of liver before.  Never had upper GI scope or colonoscopy.  No family history of GI cancer.    At emergency, hemoglobin drop was noted again.  CAT scan show enteritis, cirrhosis, 1 mass lesion in the liver.  Platelet count was about 100,000 hemoglobin down to 9.6.  Liver enzymes were grossly normal, total bilirubin less than 1.    The patient stated overnight at the emergency room with IV support, IV antibiotic, IV PPI and octreotide.      Past Medical History:   Diagnosis Date    CAD (coronary artery disease)     MI (myocardial infarction) (HCC)     x2    Sciatica          Past Surgical History:   Procedure Laterality Date    OTHER CARDIAC SURGERY      stent    VASECTOMY         No family history on file.    Social History     Socioeconomic History    Marital status: Single   Tobacco Use    Smoking status: Some Days     Types: Cigarettes    Smokeless tobacco: Never    Tobacco comments:     10-12 cigs/day   Vaping Use    Vaping Use: Never used   Substance and Sexual  Activity    Alcohol use: Yes     Comment: rare    Drug use: Yes     Types: Inhaled     Comment: marijuana             Physical Exam:  Vitals:    08/17/23 0431 08/17/23 0501 08/17/23 0532 08/17/23 0601   BP: 121/61 124/67 (!) 142/65 119/71   Pulse: 71 69 73 82   Resp: 20 17 18 19   Temp:       TempSrc:       SpO2: 89% 91% 91% 92%   Weight:       Height:                 Labs:  Recent Labs     08/15/23  1730 08/16/23  1334 08/17/23  0013   WBC 5.2 7.1 6.6   RBC 3.59* 3.84* 3.17*   HEMOGLOBIN 10.8* 11.4* 9.6*   HEMATOCRIT 33.7* 34.7* 29.1*   MCV 93.9 90.4 91.8   MCH 30.1 29.7 30.3   MCHC 32.0* 32.9 33.0   RDW 46.1 45.3 46.8   PLATELETCT 106* 154* 101*   MPV 12.6 12.2 12.7     Recent Labs     08/15/23  1730 08/16/23  1334 08/17/23  0013   SODIUM 139 141 139   POTASSIUM 3.9 4.0 4.4   CHLORIDE 106 107 108   CO2 25 23 21   GLUCOSE 169* 127* 121*   BUN 22 31* 30*     Recent Labs     08/16/23  1334   APTT 28.9   INR 1.17*       Recent Labs     08/15/23  1730 08/16/23  1334 08/17/23  0013   ASTSGOT 26 49* 46*   ALTSGPT 28 41 42   TBILIRUBIN 0.6 0.8 0.5   ALKPHOSPHAT 59 67 53   GLOBULIN 2.4 2.8 2.5   INR  --  1.17*  --          Imaging:  CTA ABDOMEN PELVIS W & W/O POST PROCESS  Narrative: 8/16/2023 4:05 PM    HISTORY/REASON FOR EXAM:  Bloody stool    TECHNIQUE/EXAM DESCRIPTION:  CT angiogram of the abdomen and pelvis without and with contrast, with reconstructions.    Initial precontrast helical sections were obtained from the diaphragmatic domes to the lesser trochanters. Following this, 100 mL of Omnipaque 350 nonionic contrast was administered at 5.0 mL/sec and helical scanning was obtained from the diaphragmatic   domes through the lesser trochanters. Thin section overlapping reconstruction interval was utilized and multiplanar volume reformatted were generated in the sagittal and coronal planes.    3D angiographic images were reviewed on PACS. Maximum intensity projection (MIP) images were generated and reviewed.    Low  dose optimization technique was utilized for this CT exam including automated exposure control and adjustment of the mA and/or kV according to patient size.    COMPARISON:   None.    FINDINGS:    Noncontrast images:  There is no intramural hematoma.    Punctate, nonobstructing left renal stone.    Contrast images:    Aorta and vasculature: /Chronic changes. No aneurysm or dissection.    Nodular hepatic contour. 4.6 cm right hepatic mass.  Spleen is normal in size.    Pancreas is unremarkable.  No significant biliary duct dilatation. Cholelithiasis.    Adrenal glands are normal.  Kidneys enhance symmetrically.    No evidence of intraluminal contrast. Mildly dilated and hyperenhancing loops of small bowel.  There is no adenopathy or free fluid.    3D angiographic/MIP images of the vasculature confirm the vascular findings as described above.  Impression: 1.  No evidence of active arterial gastrointestinal hemorrhage.  2.  Likely enteritis.  3.  Cirrhosis with 4.6 cm hepatic mass, likely HCC. Recommend nonemergent MRI hepatic protocol.  4.  Atherosclerotic changes. No aneurysm or dissection.  5.  Punctate, nonobstructing left renal stone.  6.  Cholelithiasis.    Study unavailable for interpretation until 8/16/2023 5:59 PM due to unknown issue with hospital PACS system.  DX-CHEST-PORTABLE (1 VIEW)  Narrative: 8/16/2023 2:33 PM    HISTORY/REASON FOR EXAM:  Blood in vomit or stool or dark tarry stool    TECHNIQUE/EXAM DESCRIPTION AND NUMBER OF VIEWS:  Single portable view of the chest.    COMPARISON: Yesterday    FINDINGS:    HEART: Stable size.  LUNGS: No areas of air space disease are demonstrated.  PLEURA: No effusion or pneumothorax.  Impression: No acute cardiac or pulmonary abnormalities are identified.            Impressions:  63 years old gentleman with being significant GI medical history presented via the emergency room to inpatient GI service for newly diagnosis cirrhosis, liver lesion, GI  bleeding.    Regarding the GI bleeding, please continue IV PPI and octreotide.  We are going to add the patient on for today's EGD.  Etiology could be benign erosion, ulcer or cirrhotic bleeding such as PHG or varices, cancer is not excluded yet.    For the newly diagnosed cirrhosis, the patient denies alcohol use, we will suggest basic hepatitis B and C panel, iron panel, liver specific MRI, tumor markers including alpha-fetoprotein, CEA and a CA 19-9.     Inpatient GI team will continue to follow up.       Diagnosis: upper gastrointestinal bleeding.   Procedure: Esophagogastroduodenoscopy with bleeding control including banding.       This note was generated using voice recognition software which has a small chance of producing errors of grammar and possibly content. I have made every reasonable attempt to find and correct any obvious errors, but expect that some may not be found prior to finalization of this note.

## 2023-08-17 NOTE — ED NOTES
Report given to ADENIKE mobley. Pt to [facility]Op via [facility] w/ transport. Pt A&Ox4 on 2L O2. All belongings are w/ pt.

## 2023-08-17 NOTE — ED NOTES
Pt sleeping in [facility].  Awakens easily, medicated per MAR.  Educated on medications.  Connected to monitor.  VSS on RA. No acute distress at this time.

## 2023-08-17 NOTE — ED NOTES
Rounded on pt. He denies needing nursing interventions at this time. IVF infusing. Call light and urinal within reach.

## 2023-08-17 NOTE — ED NOTES
Pt medicated per MAR.  Pt experienced discomfort during Rocephin administration.  Reports mild burning. Updated on POC.

## 2023-08-17 NOTE — ED NOTES
"Attempted to start secondary PIV, after one missed attempt pt stated \"Im going to slap somebody\".  No more PIV insertion attempts were made.  Pt medicated per MAR.  NS paused while octreotide is infusing.  "

## 2023-08-17 NOTE — ASSESSMENT & PLAN NOTE
Weakness for the past 2-3 days.  Most likely in the setting of anemia with active GI bleed.  PTOT - no needs

## 2023-08-17 NOTE — ASSESSMENT & PLAN NOTE
endoscopy with GI that showed 3 columns of moderate esophageal varices s/p 7 banding, portal hypertensive gastropathy with scattered oozing  Completed course of ceftriaxone given his cirrhosis  Continue IV octreotide and PPI twice daily  GI soft diet  If he has recurrent bleed, may need to discuss with radiology for gastric variceal coil per GI  Continue to monitor Hgb

## 2023-08-17 NOTE — ANESTHESIA POSTPROCEDURE EVALUATION
Patient: Tyrone Giraldo    Procedure Summary     Date: 08/17/23 Room / Location: [facility] ROOM 26 / SURGERY SAME DAY [facility]    Anesthesia Start: 0956 Anesthesia Stop: 1044    Procedures:       GASTROSCOPY (Esophagus)      GASTROSCOPY, WITH BANDING (Esophagus) Diagnosis: (ESOPHAGEAL VARICES, PORTAL HYN GASTROPATHY)    Surgeons: Dixon Zendejas M.D. Responsible Provider: Angelica Howell M.D.    Anesthesia Type: general ASA Status: 3          Final Anesthesia Type: general  Last vitals  BP   Blood Pressure: 109/59    Temp   36.9 °C (98.4 °F)    Pulse   76   Resp   (!) 11    SpO2   97 %      Anesthesia Post Evaluation    Patient location during evaluation: PACU  Patient participation: complete - patient participated  Level of consciousness: awake and alert  Pain score: 0    Airway patency: patent  Anesthetic complications: no  Cardiovascular status: hemodynamically stable  Respiratory status: acceptable  Hydration status: euvolemic    PONV: none          No notable events documented.     Nurse Pain Score: 0 (NPRS)

## 2023-08-17 NOTE — PROGRESS NOTES
4 Eyes Skin Assessment Completed by Adilia, ADENIKE and ADENIKE Bone.    Head WDL  Ears WDL  Nose WDL  Mouth WDL  Neck WDL  Breast/Chest WDL  Shoulder Blades WDL  Spine WDL  (R) Arm/Elbow/Hand WDL  (L) Arm/Elbow/Hand WDL  Abdomen WDL  Groin WDL  Scrotum/Coccyx/Buttocks WDL  (R) Leg WDL  (L) Leg WDL  (R) Heel/Foot/Toe WDL  (L) Heel/Foot/Toe WDL          Devices In Places Tele Box, Blood Pressure Cuff, Pulse Ox, and Nasal Cannula      Interventions In Place Pillows    Possible Skin Injury No    Pictures Uploaded Into Epic N/A  Wound Consult Placed N/A  RN Wound Prevention Protocol Ordered No

## 2023-08-17 NOTE — ANESTHESIA PROCEDURE NOTES
Peripheral IV    Date/Time: 8/17/2023 10:04 AM    Performed by: Angelica Howell M.D.  Authorized by: Angelica Howell M.D.    Size:  18 G  Laterality:  Left  Peripheral IV Location:  AC  Local Anesthetic:  None  Site Prep:  Alcohol  Technique:  Ultrasound guided  Attempts:  1

## 2023-08-17 NOTE — ED NOTES
Bedside report received from ADENIKE Harrell.  Pt resting in [facility], Connected to cardiac monitor, VSS on RA.  Fall risk precautions in place, call light in reach.  Pt awake and alert, no acute distress at this time.

## 2023-08-17 NOTE — ED NOTES
Octreotide infusion finished.  NS infusion restarted.  Pt sleeping in gurney, attached to monitor.  VSS on RA.  No acute distress at this time.

## 2023-08-17 NOTE — ASSESSMENT & PLAN NOTE
History of 2 myocardial infarctions in 2006 and thousand 9, s/p stent placement.  Patient takes aspirin 325 mg at home.  No acute episode of chest pain, or troponins.  -Discontinued aspirin in the setting of active GI bleed.  Reassess at the time of discharge, may decrease dosing to 81 mg

## 2023-08-17 NOTE — ANESTHESIA PREPROCEDURE EVALUATION
Case: [id number] Date/Time: 08/17/23 1045    Procedure: GASTROSCOPY (Esophagus)    Anesthesia type: General    Pre-op diagnosis: GI BLEEDING    Location: [facility] ROOM 26 / SURGERY SAME DAY [facility]    Surgeons: Dixon Zendejas M.D.          Relevant Problems   Other   (positive) GI bleed   (positive) History of MI (myocardial infarction)   (positive) Liver mass       Physical Exam    Airway   Mallampati: II  TM distance: >3 FB  Neck ROM: full       Cardiovascular - normal exam  Rhythm: regular  Rate: normal  (-) murmur     Dental - normal exam           Pulmonary - normal exam  Breath sounds clear to auscultation     Abdominal    Neurological - normal exam                 Anesthesia Plan    ASA 3   ASA physical status 3 criteria: CAD/stents (> 3 months)    Plan - general       Airway plan will be ETT          Induction: intravenous    Postoperative Plan: Postoperative administration of opioids is intended.    Pertinent diagnostic labs and testing reviewed    Informed Consent:    Anesthetic plan and risks discussed with patient.    Use of blood products discussed with: patient whom consented to blood products.

## 2023-08-17 NOTE — ED NOTES
Pt sleeping in gurney.  Awakens easily, denies pain or discomfort.  Pt connected to monitor.  VSS on RA.  NS infusing.

## 2023-08-17 NOTE — PROGRESS NOTES
[facility] Medicine Daily Progress Note    Date of Service  8/17/2023    Chief Complaint  Tyrone Giraldo is a 63 y.o. male admitted 8/16/2023 with melena.    Hospital Course  Tyrone is a 63 y.o. male who presented 8/16/2023 with concerns of weakness and dark stools.  Past medical history significant for coronary artery disease and MI x2 in 2006 and 2009, chronic sciatica pain.     Patient states that he has been feeling weak and dizzy for the last couple of days.  Patient presented to the ED yesterday on 8/15/2023 for similar complaints.  At the time he denied any melena or hematochezia.  Patient was hemodynamically stable.  After appropriate work-up, he was discharged home.  This morning, patient had a bowel movement with melanotic dark stools was dark red tinge.  He continued to feel weak and dizzy.  Therefore, he presented back to the ED.  Patient felt nauseous and sick to his stomach with a queasy feeling.  Patient denies any similar episodes of black, tarry stools in the past.  This is the first occurrence.  Denies any use of blood thinners.  Of note, patient is on aspirin 325 mg.  Denies any NSAID use, no alcohol use.  GI was contacted by the ED physician: Recommend n.p.o. after midnight, antibiotic for SBP prophylaxis, IV PPI, and octreotide drip.  Patient will be scheduled for endoscopy on 8/17/2023.     In the ED, patient appears to be hemodynamically stable.  Fecal occult blood test positive.  No active bleed.  TRINO resulted in black stools with dark red tinge on the glove.  CBC concerning for normocytic anemia.  CMP shows a slight elevation in AST.  Cardiac markers negative.  TEG scan unremarkable.  CT abdomen pelvis with contrast shows cirrhosis of the liver with a 4.6 cm hepatic mass, requiring further MRI imaging.    Interval Problem Update  No acute events overnight.  Patient is s/p EGD showing esophageal varices s/p banding and portal hypertensive gastropathy with oozing.  GI recommend octreotide  drip x72 hours.  Continue PPI IV BID and clear liquid diet for now.  Continue ceftriaxone for GI bleed.  Patient reports remote hx of drinking, quit 35 years ago, also hx hep A. He takes aspirin and multivitamin at home, no other meds.  Discussed findings of liver mass on CT scan, MRI ordered.  Patient denies any focal pain or discomfort.  Hgb 9.6, monitor. Transfuse for Hgb<7.      I have discussed this patient's plan of care and discharge plan at IDT rounds today with Case Management, Nursing, Nursing leadership, and other members of the IDT team.    Consultants/Specialty  GI    Code Status  Full Code    Disposition  The patient is not medically cleared for discharge to home or a post-acute facility.  Anticipate discharge to: home with close outpatient follow-up    I have placed the appropriate orders for post-discharge needs.    Review of Systems  Review of Systems   Constitutional:  Negative for chills and fever.   Eyes:  Negative for blurred vision and pain.   Respiratory:  Negative for cough and shortness of breath.    Cardiovascular:  Negative for chest pain, palpitations and leg swelling.   Gastrointestinal:  Positive for blood in stool and melena. Negative for abdominal pain, constipation, diarrhea, nausea and vomiting.   Genitourinary:  Negative for dysuria and urgency.   Musculoskeletal:  Negative for back pain and falls.   Skin:  Negative for itching and rash.   Neurological:  Positive for weakness. Negative for dizziness, sensory change, focal weakness and headaches.   Psychiatric/Behavioral:  Negative for substance abuse. The patient does not have insomnia.         Physical Exam  Temp:  [36.8 °C (98.2 °F)-36.9 °C (98.4 °F)] 36.8 °C (98.3 °F)  Pulse:  [67-95] 77  Resp:  [12-20] 18  BP: (102-142)/(58-76) 132/76  SpO2:  [87 %-100 %] 97 %    Physical Exam  Constitutional:       General: He is not in acute distress.     Appearance: He is not ill-appearing.   HENT:      Head: Normocephalic and atraumatic.       Right Ear: External ear normal.      Left Ear: External ear normal.      Mouth/Throat:      Pharynx: No oropharyngeal exudate or posterior oropharyngeal erythema.   Eyes:      Extraocular Movements: Extraocular movements intact.      Pupils: Pupils are equal, round, and reactive to light.   Cardiovascular:      Rate and Rhythm: Normal rate and regular rhythm.      Pulses: Normal pulses.      Heart sounds: Normal heart sounds.   Pulmonary:      Effort: Pulmonary effort is normal. No respiratory distress.      Breath sounds: Normal breath sounds. No wheezing or rales.   Abdominal:      General: Bowel sounds are normal. There is no distension.      Palpations: Abdomen is soft.      Tenderness: There is no abdominal tenderness. There is no guarding.   Musculoskeletal:         General: No swelling or tenderness.      Cervical back: Normal range of motion and neck supple.      Right lower leg: No edema.      Left lower leg: No edema.   Skin:     General: Skin is warm and dry.   Neurological:      General: No focal deficit present.      Mental Status: He is oriented to person, place, and time.      Cranial Nerves: No cranial nerve deficit.      Sensory: No sensory deficit.      Motor: No weakness.   Psychiatric:         Mood and Affect: Mood normal.         Behavior: Behavior normal.         Fluids    Intake/Output Summary (Last 24 hours) at 8/17/2023 1540  Last data filed at 8/17/2023 1043  Gross per 24 hour   Intake 600 ml   Output 8 ml   Net 592 ml       Laboratory  Recent Labs     08/15/23  1730 08/16/23  1334 08/17/23  0013   WBC 5.2 7.1 6.6   RBC 3.59* 3.84* 3.17*   HEMOGLOBIN 10.8* 11.4* 9.6*   HEMATOCRIT 33.7* 34.7* 29.1*   MCV 93.9 90.4 91.8   MCH 30.1 29.7 30.3   MCHC 32.0* 32.9 33.0   RDW 46.1 45.3 46.8   PLATELETCT 106* 154* 101*   MPV 12.6 12.2 12.7     Recent Labs     08/15/23  1730 08/16/23  1334 08/17/23  0013   SODIUM 139 141 139   POTASSIUM 3.9 4.0 4.4   CHLORIDE 106 107 108   CO2 25 23 21   GLUCOSE  169* 127* 121*   BUN 22 31* 30*   CREATININE 1.19 0.73 0.73   CALCIUM 8.2* 8.8 8.2*     Recent Labs     08/16/23  1334   APTT 28.9   INR 1.17*         Recent Labs     08/17/23  0013   TRIGLYCERIDE 95   HDL 17*   LDL 33       Imaging  CTA ABDOMEN PELVIS W & W/O POST PROCESS   Final Result      1.  No evidence of active arterial gastrointestinal hemorrhage.   2.  Likely enteritis.   3.  Cirrhosis with 4.6 cm hepatic mass, likely HCC. Recommend nonemergent MRI hepatic protocol.   4.  Atherosclerotic changes. No aneurysm or dissection.   5.  Punctate, nonobstructing left renal stone.   6.  Cholelithiasis.      Study unavailable for interpretation until 8/16/2023 5:59 PM due to unknown issue with hospital PACS system.      DX-CHEST-PORTABLE (1 VIEW)   Final Result      No acute cardiac or pulmonary abnormalities are identified.      MR-ABDOMEN-WITH & W/O    (Results Pending)        Assessment/Plan  * GI bleed- (present on admission)  Assessment & Plan  Patient presentation after acute episode of dark red/black muddy stool at home.  TRINO showed dark stool with retention on glove residue.  FOBT positive.  Patient hemodynamically stable, no indication for emergent endoscopy.  GI consulted.  CT imaging shows 4.6 cm hepatic mass with no acute bleed.  Patient has never had an endoscopy  S/p EGD showing esophageal varices, portal hypertensive gastropathy with oozing and blood remannts; s/p banding of varices  Continue octreotide drip x72 hours  Continue IV PPI BID, ceftriaxone  Clear liquid diet for now per GI  GI following, appreciate recommendations  Will need referral to outpatient GI and PCP for follow up    Liver mass- (present on admission)  Assessment & Plan  MRI ordered for further evaluation.    History of MI (myocardial infarction)  Assessment & Plan  History of 2 myocardial infarctions in 2006 and thousand 9, s/p stent placement.  Patient takes aspirin 325 mg at home.  No acute episode of chest pain, or  "troponins.  -Discontinue aspirin in the setting of active GI bleed.  Reassess at the time of discharge, may decrease dosing to 81 mg  -Hemoglobin A1c  -Lipid panel  -Day team to consider establishing patient with cardiology at the time of discharge.    Weakness  Assessment & Plan  Weakness for the past 2-3 days.  Most likely in the setting of anemia with active GI bleed.  -Physical therapy and Occupational Therapy referrals  -Continue to monitor    Acute blood loss anemia- (present on admission)  Assessment & Plan  Normocytic anemia as per ED labs.  Most likely in the setting of GI bleed.  Differential also includes possible bleed from hepatic mass as seen on CT imaging.  -Same plan as under \"GI bleed\"         VTE prophylaxis:   SCDs/TEDs          "

## 2023-08-17 NOTE — H&P
History & Physical Note    Date of Admission: 8/16/2023  Admission Status: Inpatient  [facility] Team: MARY  Attending: Aletha Olsen M.D.   Senior Resident: Dr. Pop Alves  Contact Number: [phone]    Chief Complaint: Weakness and dark stools    History of Present Illness (HPI):  Tyrone is a 63 y.o. male who presented 8/16/2023 with concerns of weakness and dark stools.  Past medical history significant for coronary artery disease and MI x2 in 2006 and 2009, chronic sciatica pain.    Patient states that he has been feeling weak and dizzy for the last couple of days.  Patient presented to the ED yesterday on 8/15/2023 for similar complaints.  At the time he denied any melena or hematochezia.  Patient was hemodynamically stable.  After appropriate work-up, he was discharged home.  This morning, patient had a bowel movement with melanotic dark stools was dark red tinge.  He continued to feel weak and dizzy.  Therefore, he presented back to the ED.  Patient felt nauseous and sick to his stomach with a queasy feeling.  Patient denies any similar episodes of black, tarry stools in the past.  This is the first occurrence.  Denies any use of blood thinners.  Of note, patient is on aspirin 325 mg.  Denies any NSAID use, no alcohol use.  GI was contacted by the ED physician: Recommend n.p.o. after midnight, antibiotic for SBP prophylaxis, IV PPI, and octreotide drip.  Patient will be scheduled for endoscopy on 8/17/2023.    In the ED, patient appears to be hemodynamically stable.  Fecal occult blood test positive.  No active bleed.  TRINO resulted in black stools with dark red tinge on the glove.  CBC concerning for normocytic anemia.  CMP shows a slight elevation in AST.  Cardiac markers negative.  TEG scan unremarkable.  CT abdomen pelvis with contrast shows cirrhosis of the liver with a 4.6 cm hepatic mass, requiring further MRI imaging.      Review of Systems:  Review of Systems   Constitutional:  Negative for chills,  fever and weight loss.   HENT:  Negative for congestion, hearing loss and sore throat.    Eyes:  Negative for blurred vision, double vision and discharge.   Respiratory:  Negative for cough, sputum production, shortness of breath and wheezing.    Cardiovascular:  Negative for chest pain, palpitations, orthopnea, claudication and leg swelling.   Gastrointestinal:  Positive for abdominal pain, blood in stool, heartburn, melena and nausea. Negative for constipation, diarrhea and vomiting.   Genitourinary:  Negative for dysuria, frequency, hematuria and urgency.   Musculoskeletal:  Positive for joint pain. Negative for back pain and neck pain.   Skin:  Negative for rash.   Neurological:  Positive for dizziness and weakness. Negative for sensory change and headaches.   Endo/Heme/Allergies:  Negative for environmental allergies. Does not bruise/bleed easily.   Psychiatric/Behavioral:  Negative for depression. The patient is not nervous/anxious and does not have insomnia.        Past Medical History:   Past Medical History was reviewed with patient.   has a past medical history of CAD (coronary artery disease), MI (myocardial infarction) (HCC), and Sciatica.    Past Surgical History: Past Surgical History was reviewed with patient.   has a past surgical history that includes other cardiac surgery and vasectomy.    Medications: Medications have been reviewed with patient.  Prior to Admission Medications   Prescriptions Last Dose Informant Patient Reported? Taking?   ASPIRIN 325 MG TABS  Patient Yes No   Sig: Take 325 mg by mouth every day. Took 3 at 0130 on 9-12-09   DAILY VITAMIN PO  Patient Yes No   Sig: Take  by mouth every day.   albuterol 108 (90 Base) MCG/ACT Aero Soln inhalation aerosol   No No   Sig: Inhale 2 Puffs every 6 hours as needed for Shortness of Breath.   benzonatate (TESSALON) 100 MG Cap   No No   Sig: Take 1 Capsule by mouth 3 times a day as needed for Cough.      Facility-Administered Medications: None         Allergies: Allergies have been reviewed with patient.  Allergies   Allergen Reactions    Cortisone Rash    Morphine        Family History:  No pertinent family history as reported by patient.    Social History:   Tobacco: 5 cigarettes a day for the past few years, quit a week ago.  Alcohol: Quit drinking 35 years ago  Recreational drugs (illegal and prescription): Smokes marijuana occasionally  Employment: None  Activity Level: Up ad gonsalo.  Living situation: Lives in a motel, single-story, no stairs  Recent travel: None  Primary Care Provider: reviewed Pcp Pt States None      Vitals:  Temp:  [36.3 °C (97.4 °F)] 36.3 °C (97.4 °F)  Pulse:  [] 95  Resp:  [12-20] 15  BP: (116-133)/(62-78) 128/62  SpO2:  [94 %-99 %] 94 %    Physical Exam  Constitutional:       General: He is not in acute distress.     Appearance: Normal appearance. He is normal weight.   HENT:      Head: Normocephalic and atraumatic.      Right Ear: External ear normal.      Left Ear: External ear normal.      Nose: Nose normal. No congestion.      Mouth/Throat:      Mouth: Mucous membranes are moist.      Pharynx: Oropharynx is clear.   Eyes:      General:         Right eye: No discharge.         Left eye: No discharge.      Extraocular Movements: Extraocular movements intact.      Conjunctiva/sclera: Conjunctivae normal.      Pupils: Pupils are equal, round, and reactive to light.   Neck:      Vascular: No carotid bruit.   Cardiovascular:      Rate and Rhythm: Normal rate and regular rhythm.      Pulses: Normal pulses.      Heart sounds: Normal heart sounds. No murmur heard.     No friction rub.   Pulmonary:      Effort: Pulmonary effort is normal. No respiratory distress.      Breath sounds: Normal breath sounds. No wheezing or rhonchi.   Abdominal:      General: Abdomen is flat. Bowel sounds are normal.      Palpations: Abdomen is soft.      Tenderness: There is no abdominal tenderness. There is no right CVA tenderness or left CVA  tenderness.      Comments: TRINO showed black stool in the rectal vault with dark red-tinged on glove residue.   Musculoskeletal:         General: No tenderness. Normal range of motion.      Cervical back: Normal range of motion and neck supple. No rigidity.      Right lower leg: No edema.      Left lower leg: No edema.   Skin:     General: Skin is warm.      Capillary Refill: Capillary refill takes less than 2 seconds.      Findings: No lesion or rash.   Neurological:      General: No focal deficit present.      Mental Status: He is alert and oriented to person, place, and time. Mental status is at baseline.   Psychiatric:         Mood and Affect: Mood normal.         Behavior: Behavior normal.         Thought Content: Thought content normal.         Judgment: Judgment normal.         Labs:   Lab Results   Component Value Date/Time    WBC 7.1 08/16/2023 01:34 PM    RBC 3.84 (L) 08/16/2023 01:34 PM    HEMOGLOBIN 11.4 (L) 08/16/2023 01:34 PM    HEMATOCRIT 34.7 (L) 08/16/2023 01:34 PM    MCV 90.4 08/16/2023 01:34 PM    MCH 29.7 08/16/2023 01:34 PM    MCHC 32.9 08/16/2023 01:34 PM    MPV 12.2 08/16/2023 01:34 PM    NEUTSPOLYS 71.50 08/16/2023 01:34 PM    LYMPHOCYTES 19.90 (L) 08/16/2023 01:34 PM    MONOCYTES 5.80 08/16/2023 01:34 PM    EOSINOPHILS 1.40 08/16/2023 01:34 PM    BASOPHILS 1.00 08/16/2023 01:34 PM    ANISOCYTOSIS 1+ 05/14/2023 11:55 AM        Lab Results   Component Value Date/Time    SODIUM 141 08/16/2023 01:34 PM    POTASSIUM 4.0 08/16/2023 01:34 PM    CHLORIDE 107 08/16/2023 01:34 PM    CO2 23 08/16/2023 01:34 PM    GLUCOSE 127 (H) 08/16/2023 01:34 PM    BUN 31 (H) 08/16/2023 01:34 PM    CREATININE 0.73 08/16/2023 01:34 PM    CREATININE 1.1 05/22/2007 03:45 AM        Lab Results   Component Value Date/Time    PROTHROMBTM 14.8 (H) 08/16/2023 01:34 PM    INR 1.17 (H) 08/16/2023 01:34 PM          EKG: Per my read, EKG shows NSR of rate 85, no ST-T changes concerning for acute ischemia    Imaging:   CTA  "ABDOMEN PELVIS W & W/O POST PROCESS   Final Result      1.  No evidence of active arterial gastrointestinal hemorrhage.   2.  Likely enteritis.   3.  Cirrhosis with 4.6 cm hepatic mass, likely HCC. Recommend nonemergent MRI hepatic protocol.   4.  Atherosclerotic changes. No aneurysm or dissection.   5.  Punctate, nonobstructing left renal stone.   6.  Cholelithiasis.      Study unavailable for interpretation until 8/16/2023 5:59 PM due to unknown issue with hospital PACS system.      DX-CHEST-PORTABLE (1 VIEW)   Final Result      No acute cardiac or pulmonary abnormalities are identified.      MR-ABDOMEN-WITH & W/O    (Results Pending)       Assessment and Plan:     * GI bleed- (present on admission)  Assessment & Plan  Patient presentation after acute episode of dark red/black muddy stool at home.  TRINO showed dark stool with retention on glove residue.  FOBT positive.  Patient hemodynamically stable, no indication for emergent endoscopy.  GI consulted.  CT imaging shows 4.6 cm hepatic mass with no acute bleed.  Patient has never had an endoscopy  -Admit to medicine, telemetry monitoring  -N.p.o. after midnight for endoscopy in the a.m., 8/17/2023  -Octreotide drip  -IV pantoprazole 40 mg twice daily  -Ceftriaxone for SBP prophylaxis  -MRI hepatic protocol.    Anemia  Assessment & Plan  Normocytic anemia as per ED labs.  Most likely in the setting of GI bleed.  Differential also includes possible bleed from hepatic mass as seen on CT imaging.  -Same plan as under \"GI bleed\"    History of MI (myocardial infarction)  Assessment & Plan  History of 2 myocardial infarctions in 2006 and thousand 9, s/p stent placement.  Patient takes aspirin 325 mg at home.  No acute episode of chest pain, or troponins.  -Discontinue aspirin in the setting of active GI bleed.  Reassess at the time of discharge, may decrease dosing to 81 mg  -Hemoglobin A1c  -Lipid panel  -Day team to consider establishing patient with cardiology at the " time of discharge.    Weakness  Assessment & Plan  Weakness for the past 2-3 days.  Most likely in the setting of anemia with active GI bleed.  -Physical therapy and Occupational Therapy referrals  -Continue to monitor       Code Status: Full code  DVT prophylaxis: SCD  Diet: Regular diet, n.p.o. after midnight for EGD in the morning  GI: IV pantoprazole  Disposition: Continued inpatient stay for GI bleed work-up      Please note that this dictation was created using voice recognition software. I have made every reasonable attempt to correct obvious errors, but there may be errors of grammar and possibly content that I did not discover before finalizing the note.

## 2023-08-17 NOTE — ED NOTES
Pt sleeping in gurney.  Visible chest rise noted.  Connected to monitor, VSS.  No acute distress at this time.

## 2023-08-17 NOTE — ASSESSMENT & PLAN NOTE
Has had recurring anemia needing transfusion since EGD  Continue to monitor Hgb q8h, transfuse if less than 7

## 2023-08-17 NOTE — ED NOTES
Pt sleeping in gurney.  Visible chest rise noted. Pt connected to monitor, VSS on RA.  Fall risk sign in place. Call light in reach.

## 2023-08-17 NOTE — OR NURSING
1041 Patient arrived to PACU. Report received from anesthesia and OR RN. Patient on 10L of oxygen via mask. Placed on monitor. Patient sleeping.     1130 Patient awake, declines any needs. VSS. Awaiting bed assignment. Tolerating sips of water and juice.     1330 Called pharmacy for missing octreotide     1400 Patient with RN on zoll monitor to T8, report given to Adilia JEONG.

## 2023-08-17 NOTE — CARE PLAN
The patient is Stable - Low risk of patient condition declining or worsening    Shift Goals  Clinical Goals: monitor vitals  Patient Goals: eat  Family Goals: GIOVANNI    Progress made toward(s) clinical / shift goals:      Problem: Pain - Standard  Goal: Alleviation of pain or a reduction in pain to the patient’s comfort goal  Outcome: Progressing       Problem: Knowledge Deficit - Standard  Goal: Patient and family/care givers will demonstrate understanding of plan of care, disease process/condition, diagnostic tests and medications  Outcome: Progressing

## 2023-08-17 NOTE — ED NOTES
Bedside report received from ADENIKE Carlson. Pt kimberly on gurney w/ eyes closed. NAD noted. Equal chest rise and fall.

## 2023-08-17 NOTE — ED NOTES
Pt sleeping in [facility].  No acute distress at this time, visible chest rise noted.

## 2023-08-17 NOTE — PROCEDURES
OPERATIVE REPORT    PATIENT:   Tyrone Giraldo   1960       PREOPERATIVE DIAGNOSES/INDICATIONS: Upper GI bleeding    POSTOPERATIVE DIAGNOSIS:   Esophageal varices, 3 columns, mod size, no active bleeding now, s/p 7 banding due to size.   Portal hypertensive gastropathy with scattered oozing in body, no treatment provided. Could be bleeding more before.   No overt gastric varices.   Some remaining blood in fundus.   Duodenum grossly normal.     PROCEDURE:  ESOPHAGOGASTRODUODENOSCOPY    PHYSICIAN:  Dixon Zendejas MD. MPH.    ANESTHESIA:  Per anesthesiologist or ICU team.     LOCATION: [facility]    CONSENT:  OBTAINED. The risks, benefits and alternatives of the procedure were discussed in details. The risks include and are not limited to bleeding, infection, perforation, missed lesions, and sedations risks (cardiopulmonary compromise and allergic reaction to medications).    DESCRIPTION: The patient presented to the procedure room.  After routine checkup was performed, patient was brought into the endoscopy suite.  Patient was placed on his left lateral decubitus position. A bite block was placed in patient's mouth. Patient was sedated by anesthesia.  Vital signs were monitored throughout the procedure.  Oxygenation support was provided throughout the procedure. Upper endoscope was inserted into patient's mouth and advanced to the second portion of the duodenum under direct visualization.      Once the site was reached and examined, the upper endoscope was withdrawn.  Retroflexion was made within the stomach.  The stomach was decompressed, scope was withdrawn and the procedure was terminated.    The patient tolerated the procedure well.  There were no immediate complications.    OPERATIVE FINDINGS:    Esophageal varices, 3 columns, mod size, no active bleeding now, s/p 7 banding due to size.   Portal hypertensive gastropathy with scattered oozing in body, no treatment provided. Could be bleeding more before.   No  overt gastric varices.   Some remaining blood in fundus.   Duodenum grossly normal.     RECOMMENDATIONS:  Could be bleeding from EVs or PHG.   Please complete total 3 days in octreotide.   Keep IV ppi bid.   Keep iv antibiotics for cirrhotic bleeding.   Okay to resume clear liquid today.     Inpatient GI team will continue to follow up.       This note has been transcribed with digital voice recognition software and although it has been reviewed may contain grammatical or word errors

## 2023-08-17 NOTE — ANESTHESIA TIME REPORT
Anesthesia Start and Stop Event Times     Date Time Event    8/17/2023 0947 Ready for Procedure     0956 Anesthesia Start     1044 Anesthesia Stop        Responsible Staff  08/17/23    Name Role Begin End    Angelica Howell M.D. Anesth 0956 [id number]        Overtime Reason:  no overtime (within assigned shift)    Comments:

## 2023-08-17 NOTE — ED NOTES
Pt resting in gurney.  Visible chest rise noted.  Pt connected to monitor. VSS on RA. Call light in reach. No acute distress at this time.

## 2023-08-17 NOTE — ED NOTES
Pt medicated per MAR. Phlebotomist at bedside for blood draw. Pt denies further needs at this time. Call light in reach.

## 2023-08-17 NOTE — ED NOTES
Pt sleeping in gurney.  Visible chest rise noted.  Pt connected to monitor, VSS on RA. Call light in reach. No acute distress at this time.

## 2023-08-17 NOTE — PROGRESS NOTES
Discussed with Dr. Ireland.     Cirrhosis found with possible liver tumor, bleeding with Hgb drop and dark stool.   CT did not see active extravasation.   Likely never had EGD before.     Recs:  Iv PPI bid.   Antibiotics for cirrhotic bleeding.   Octreotide  NPO  Plan to have EGD on 8/17.     Full GI note will follow.

## 2023-08-17 NOTE — ANESTHESIA PROCEDURE NOTES
Airway    Date/Time: 8/17/2023 10:16 AM    Performed by: Angelica Howell M.D.  Authorized by: Angelica Howell M.D.    Location:  OR  Urgency:  Elective  Difficult Airway: No    Indications for Airway Management:  Anesthesia      Spontaneous Ventilation: absent    Sedation Level:  Deep  Preoxygenated: Yes    Patient Position:  Sniffing  Mask Difficulty Assessment:  1 - vent by mask  Final Airway Type:  Endotracheal airway  Final Endotracheal Airway:  ETT  Cuffed: Yes    Technique Used for Successful ETT Placement:  Direct laryngoscopy    Insertion Site:  Oral  Blade Type:  Steff  Laryngoscope Blade/Videolaryngoscope Blade Size:  4  ETT Size (mm):  7.5  Measured from:  Teeth  ETT to Teeth (cm):  23  Placement Verified by: auscultation and capnometry    Cormack-Lehane Classification:  Grade I - full view of glottis  Number of Attempts at Approach:  1

## 2023-08-17 NOTE — ED NOTES
Pt ambulated to restroom with standby assist.  Pt had black, loose stool.  Pt back to room and reattached to monitor.

## 2023-08-17 NOTE — ED NOTES
Pt sleeping in gurney. Visible chest rise noted.  Pt connected to monitor, VSS. No acute distress at this time.

## 2023-08-18 ENCOUNTER — APPOINTMENT (OUTPATIENT)
Dept: RADIOLOGY | Facility: MEDICAL CENTER | Age: 63
DRG: 432 | End: 2023-08-18
Payer: MEDICARE

## 2023-08-18 LAB
ANION GAP SERPL CALC-SCNC: 9 MMOL/L (ref 7–16)
BUN SERPL-MCNC: 22 MG/DL (ref 8–22)
CALCIUM SERPL-MCNC: 7.4 MG/DL (ref 8.5–10.5)
CANCER AG19-9 SERPL-ACNC: 17.4 U/ML (ref 0–35)
CEA SERPL-MCNC: 1.9 NG/ML (ref 0–3)
CHLORIDE SERPL-SCNC: 106 MMOL/L (ref 96–112)
CO2 SERPL-SCNC: 22 MMOL/L (ref 20–33)
CREAT SERPL-MCNC: 0.87 MG/DL (ref 0.5–1.4)
ERYTHROCYTE [DISTWIDTH] IN BLOOD BY AUTOMATED COUNT: 47.3 FL (ref 35.9–50)
GFR SERPLBLD CREATININE-BSD FMLA CKD-EPI: 96 ML/MIN/1.73 M 2
GLUCOSE SERPL-MCNC: 134 MG/DL (ref 65–99)
HAV IGM SERPL QL IA: ABNORMAL
HBV CORE IGM SER QL: ABNORMAL
HBV SURFACE AG SER QL: ABNORMAL
HCT VFR BLD AUTO: 22.6 % (ref 42–52)
HCT VFR BLD AUTO: 22.8 % (ref 42–52)
HCT VFR BLD AUTO: 24.1 % (ref 42–52)
HCV AB SER QL: REACTIVE
HGB BLD-MCNC: 7.4 G/DL (ref 14–18)
HGB BLD-MCNC: 7.6 G/DL (ref 14–18)
HGB BLD-MCNC: 7.9 G/DL (ref 14–18)
MCH RBC QN AUTO: 30 PG (ref 27–33)
MCHC RBC AUTO-ENTMCNC: 32.8 G/DL (ref 32.3–36.5)
MCV RBC AUTO: 91.6 FL (ref 81.4–97.8)
PLATELET # BLD AUTO: 113 K/UL (ref 164–446)
PMV BLD AUTO: 13.1 FL (ref 9–12.9)
POTASSIUM SERPL-SCNC: 4.2 MMOL/L (ref 3.6–5.5)
RBC # BLD AUTO: 2.63 M/UL (ref 4.7–6.1)
SODIUM SERPL-SCNC: 137 MMOL/L (ref 135–145)
WBC # BLD AUTO: 5.7 K/UL (ref 4.8–10.8)

## 2023-08-18 PROCEDURE — 700111 HCHG RX REV CODE 636 W/ 250 OVERRIDE (IP): Mod: JA | Performed by: STUDENT IN AN ORGANIZED HEALTH CARE EDUCATION/TRAINING PROGRAM

## 2023-08-18 PROCEDURE — 97165 OT EVAL LOW COMPLEX 30 MIN: CPT

## 2023-08-18 PROCEDURE — 80048 BASIC METABOLIC PNL TOTAL CA: CPT

## 2023-08-18 PROCEDURE — 82105 ALPHA-FETOPROTEIN SERUM: CPT

## 2023-08-18 PROCEDURE — A9579 GAD-BASE MR CONTRAST NOS,1ML: HCPCS

## 2023-08-18 PROCEDURE — 97535 SELF CARE MNGMENT TRAINING: CPT

## 2023-08-18 PROCEDURE — 700117 HCHG RX CONTRAST REV CODE 255

## 2023-08-18 PROCEDURE — 74183 MRI ABD W/O CNTR FLWD CNTR: CPT

## 2023-08-18 PROCEDURE — 700111 HCHG RX REV CODE 636 W/ 250 OVERRIDE (IP)

## 2023-08-18 PROCEDURE — 82378 CARCINOEMBRYONIC ANTIGEN: CPT

## 2023-08-18 PROCEDURE — 770020 HCHG ROOM/CARE - TELE (206)

## 2023-08-18 PROCEDURE — 97161 PT EVAL LOW COMPLEX 20 MIN: CPT

## 2023-08-18 PROCEDURE — 700105 HCHG RX REV CODE 258: Performed by: STUDENT IN AN ORGANIZED HEALTH CARE EDUCATION/TRAINING PROGRAM

## 2023-08-18 PROCEDURE — 36415 COLL VENOUS BLD VENIPUNCTURE: CPT

## 2023-08-18 PROCEDURE — C9113 INJ PANTOPRAZOLE SODIUM, VIA: HCPCS

## 2023-08-18 PROCEDURE — 86301 IMMUNOASSAY TUMOR CA 19-9: CPT

## 2023-08-18 PROCEDURE — 85018 HEMOGLOBIN: CPT

## 2023-08-18 PROCEDURE — 85027 COMPLETE CBC AUTOMATED: CPT

## 2023-08-18 PROCEDURE — 99232 SBSQ HOSP IP/OBS MODERATE 35: CPT | Performed by: STUDENT IN AN ORGANIZED HEALTH CARE EDUCATION/TRAINING PROGRAM

## 2023-08-18 PROCEDURE — 85014 HEMATOCRIT: CPT

## 2023-08-18 PROCEDURE — 99232 SBSQ HOSP IP/OBS MODERATE 35: CPT | Performed by: NURSE PRACTITIONER

## 2023-08-18 RX ADMIN — OCTREOTIDE ACETATE 50 MCG/HR: 200 INJECTION, SOLUTION INTRAVENOUS; SUBCUTANEOUS at 15:50

## 2023-08-18 RX ADMIN — PANTOPRAZOLE SODIUM 40 MG: 40 INJECTION, POWDER, FOR SOLUTION INTRAVENOUS at 17:40

## 2023-08-18 RX ADMIN — GADOTERIDOL 15 ML: 279.3 INJECTION, SOLUTION INTRAVENOUS at 08:27

## 2023-08-18 RX ADMIN — CEFTRIAXONE SODIUM 2000 MG: 10 INJECTION, POWDER, FOR SOLUTION INTRAVENOUS at 17:40

## 2023-08-18 RX ADMIN — PANTOPRAZOLE SODIUM 40 MG: 40 INJECTION, POWDER, FOR SOLUTION INTRAVENOUS at 05:46

## 2023-08-18 ASSESSMENT — ENCOUNTER SYMPTOMS
CHILLS: 0
WEAKNESS: 1
FOCAL WEAKNESS: 0
EYE PAIN: 0
NAUSEA: 0
SENSORY CHANGE: 0
SHORTNESS OF BREATH: 0
HEARTBURN: 0
VOMITING: 0
FEVER: 0
ABDOMINAL PAIN: 0
INSOMNIA: 0
COUGH: 0
CONSTIPATION: 0
BLOOD IN STOOL: 1
PALPITATIONS: 0
BLURRED VISION: 0
DIZZINESS: 0
BLOOD IN STOOL: 0
DIARRHEA: 0
FALLS: 0
HEADACHES: 0
BACK PAIN: 0
WEAKNESS: 0
DEPRESSION: 0

## 2023-08-18 ASSESSMENT — COGNITIVE AND FUNCTIONAL STATUS - GENERAL
SUGGESTED CMS G CODE MODIFIER MOBILITY: CI
CLIMB 3 TO 5 STEPS WITH RAILING: A LITTLE
MOBILITY SCORE: 23
SUGGESTED CMS G CODE MODIFIER DAILY ACTIVITY: CH
DAILY ACTIVITIY SCORE: 24

## 2023-08-18 ASSESSMENT — PAIN DESCRIPTION - PAIN TYPE
TYPE: SURGICAL PAIN
TYPE: ACUTE PAIN

## 2023-08-18 ASSESSMENT — GAIT ASSESSMENTS
DEVIATION: INCREASED BASE OF SUPPORT
GAIT LEVEL OF ASSIST: SUPERVISED
DISTANCE (FEET): 200

## 2023-08-18 ASSESSMENT — LIFESTYLE VARIABLES: SUBSTANCE_ABUSE: 0

## 2023-08-18 ASSESSMENT — ACTIVITIES OF DAILY LIVING (ADL): TOILETING: INDEPENDENT

## 2023-08-18 ASSESSMENT — FIBROSIS 4 INDEX: FIB4 SCORE: 4.02

## 2023-08-18 NOTE — THERAPY
Physical Therapy   Initial Evaluation     Patient Name: Tyrone Giraldo  Age:  63 y.o., Sex:  male  Medical Record #: [id number]  Today's Date: 8/18/2023     Precautions  Precautions: Fall Risk    Assessment  Patient is 63 y.o. male with a diagnosis of GI bleed.     Pt tolerated session well and appears at his functional baseline. He performed all mobility with supervision and denied symptoms throughout session. He was educated on importance of out of bed activity during hospitalization to prevent functional decline. Anticipate discharge home with no additional PT follow up.     Plan    Physical Therapy Initial Treatment Plan   Duration: (P) Evaluation only    DC Equipment Recommendations: (P) None  Discharge Recommendations: (P) Anticipate that the patient will have no further physical therapy needs after discharge from the hospital       Subjective    Pt is pleasant and cooperative.      Objective       08/18/23 1050   Initial Contact Note    Initial Contact Note Order Received and Verified, Evaluation Only - Patient Does Not Require Further Acute Physical Therapy at this Time.  However, May Benefit from Post Acute Therapy for Higher Level Functional Deficits.   Precautions   Precautions Fall Risk   Pain   Intervention Declines   Prior Living Situation   Housing / Facility 1 Story Apartment / Condo   Steps Into Home 0   Steps In Home 0   Lives with - Patient's Self Care Capacity Unrelated Adult   Comments Pt reports that he lives in 1 story apartment with no steps to enter. He lives with his friend.   Prior Level of Functional Mobility   Bed Mobility Independent   Transfer Status Independent   Ambulation Independent   Ambulation Distance community   Assistive Devices Used None   Stairs Independent   Passive ROM Lower Body   Passive ROM Lower Body WDL   Active ROM Lower Body    Active ROM Lower Body  WDL   Strength Lower Body   Lower Body Strength  WDL   Balance Assessment   Sitting Balance (Static) Good   Sitting  Balance (Dynamic) Fair +   Standing Balance (Static) Fair +   Standing Balance (Dynamic) Fair   Weight Shift Sitting Good   Weight Shift Standing Fair   Bed Mobility    Supine to Sit Supervised   Sit to Supine Supervised   Gait Analysis   Gait Level Of Assist Supervised   Assistive Device None   Distance (Feet) 200   # of Times Distance was Traveled 1   Deviation Increased Base Of Support   Functional Mobility   Sit to Stand Supervised   How much difficulty does the patient currently have...   Turning over in bed (including adjusting bedclothes, sheets and blankets)? 4   Sitting down on and standing up from a chair with arms (e.g., wheelchair, bedside commode, etc.) 4   Moving from lying on back to sitting on the side of the bed? 4   How much help from another person does the patient currently need...   Moving to and from a bed to a chair (including a wheelchair)? 4   Need to walk in a hospital room? 4   Climbing 3-5 steps with a railing? 3   6 clicks Mobility Score 23   Education Group   Education Provided Role of Physical Therapist   Role of Physical Therapist Patient Response Patient;Acceptance;Explanation;Verbal Demonstration   Physical Therapy Initial Treatment Plan    Duration Evaluation only   Problem List    Problems Decreased Activity Tolerance   Anticipated Discharge Equipment and Recommendations   DC Equipment Recommendations None   Discharge Recommendations Anticipate that the patient will have no further physical therapy needs after discharge from the hospital   Interdisciplinary Plan of Care Collaboration   IDT Collaboration with  Nursing   Patient Position at End of Therapy In Bed;Bed Alarm On;Phone within Reach;Tray Table within Reach;Call Light within Reach   Session Information   Date / Session Number  8/18-1 (DC Needs)

## 2023-08-18 NOTE — PROGRESS NOTES
..Gastroenterology Progress Note               Author:  GISELA Jordan Date & Time Created: 8/18/2023 7:40 AM       Patient ID:  Name:             Tyrone Giraldo  YOB: 1960  Age:                 63 y.o.  male  MRN:               [id number]        Medical Decision Making, by Problem:  Active Hospital Problems    Diagnosis     Liver mass [R16.0]     GI bleed [K92.2]     Acute blood loss anemia [D62]     Weakness [R53.1]     History of MI (myocardial infarction) [I25.2]            Presenting Chief Complaint:  Upper GI bleeding      Interval History:  8/18/2023: Patient seen and examined.  Reports feeling hungry.  No bowel movement but is having dark black smears per nursing. VSS. Hgb 7.9<9.6. Tumor markers not ordered, placed.  MRI consistent with HCC, Hep C PCR pending    8/17/2023: EGD with Dr. Zendejas:  Esophageal varices, 3 columns, mod size, no active bleeding now, s/p 7 banding due to size.   Portal hypertensive gastropathy with scattered oozing in body, no treatment provided. Could be bleeding more before.   No overt gastric varices.   Some remaining blood in fundus.   Duodenum grossly normal.         Hospital Medications:  Current Facility-Administered Medications   Medication Dose Frequency Provider Last Rate Last Admin    labetalol (Normodyne/Trandate) injection 10 mg  10 mg Q4HRS PRN Pop Alves M.D.        cefTRIAXone (Rocephin) syringe 2,000 mg  2,000 mg Q24HRS Pop Alves M.D.   2,000 mg at 08/17/23 1731    pantoprazole (Protonix) injection 40 mg  40 mg BID Pop Alves M.D.   40 mg at 08/18/23 0546    octreotide (SandoSTATIN) 1,250 mcg in  mL Infusion  50 mcg/hr Continuous Pop Alves M.D. 10 mL/hr at 08/17/23 1416 50 mcg/hr at 08/17/23 1416   Last reviewed on 8/16/2023  7:17 PM by Xuan Calhoun       Review of Systems:  Review of Systems   Constitutional:  Positive for malaise/fatigue. Negative for chills and fever.   HENT:  Negative for hearing  loss.    Eyes:  Negative for blurred vision.   Respiratory:  Negative for cough and shortness of breath.    Cardiovascular:  Negative for chest pain and leg swelling.   Gastrointestinal:  Negative for blood in stool, constipation, diarrhea, heartburn, melena, nausea and vomiting.   Genitourinary:  Negative for dysuria.   Musculoskeletal:  Negative for back pain.   Skin:  Negative for rash.   Neurological:  Negative for dizziness and weakness.   Psychiatric/Behavioral:  Negative for depression.          Vital signs:  Weight/BMI: Body mass index is 22.16 kg/m².  /66   Pulse 79   Temp 36.6 °C (97.9 °F) (Temporal)   Resp 17   Ht 1.829 m (6')   Wt 74.1 kg (163 lb 5.8 oz)   SpO2 93%   Vitals:    08/17/23 2010 08/17/23 2330 08/17/23 2357 08/18/23 0354   BP: 126/72  112/65 107/66   Pulse: 77  81 79   Resp: 17 17 17   Temp: 36.8 °C (98.2 °F)  37.2 °C (99 °F) 36.6 °C (97.9 °F)   TempSrc: Temporal  Temporal Temporal   SpO2: 91%  95% 93%   Weight:  74.1 kg (163 lb 5.8 oz)     Height:         Oxygen Therapy:  Pulse Oximetry: 93 %, O2 (LPM): 1, O2 Delivery Device: Silicone Nasal Cannula    Intake/Output Summary (Last 24 hours) at 8/18/2023 0740  Last data filed at 8/17/2023 1800  Gross per 24 hour   Intake 1032 ml   Output 8 ml   Net 1024 ml         Physical Exam:  Physical Exam  Vitals and nursing note reviewed.   Constitutional:       General: He is not in acute distress.     Appearance: Normal appearance. He is not ill-appearing.      Comments: Thin   HENT:      Head: Normocephalic and atraumatic.      Right Ear: External ear normal.      Left Ear: External ear normal.      Nose: Nose normal.      Mouth/Throat:      Mouth: Mucous membranes are moist.      Pharynx: Oropharynx is clear.   Eyes:      General: No scleral icterus.  Cardiovascular:      Rate and Rhythm: Normal rate and regular rhythm.      Pulses: Normal pulses.      Heart sounds: Normal heart sounds.   Pulmonary:      Effort: Pulmonary effort is  normal.      Breath sounds: Normal breath sounds.   Abdominal:      General: Abdomen is flat. Bowel sounds are normal. There is no distension.      Palpations: Abdomen is soft.   Musculoskeletal:         General: Normal range of motion.      Cervical back: Normal range of motion and neck supple.   Skin:     General: Skin is warm.      Capillary Refill: Capillary refill takes less than 2 seconds.      Coloration: Skin is pale.   Neurological:      Mental Status: He is alert and oriented to person, place, and time.   Psychiatric:         Mood and Affect: Mood normal.         Behavior: Behavior normal.             Labs:  Recent Labs     08/16/23 1334 08/17/23 0013 08/18/23 0037   SODIUM 141 139 137   POTASSIUM 4.0 4.4 4.2   CHLORIDE 107 108 106   CO2 23 21 22   BUN 31* 30* 22   CREATININE 0.73 0.73 0.87   MAGNESIUM  --  1.8  --    CALCIUM 8.8 8.2* 7.4*     Recent Labs     08/15/23  1730 08/16/23  1334 08/17/23  0013 08/18/23  0037   ALTSGPT 28 41 42  --    ASTSGOT 26 49* 46*  --    ALKPHOSPHAT 59 67 53  --    TBILIRUBIN 0.6 0.8 0.5  --    LIPASE  --  16  --   --    GLUCOSE 169* 127* 121* 134*     Recent Labs     08/15/23  1730 08/16/23  1334 08/17/23  0013 08/18/23  0037   WBC 5.2 7.1 6.6 5.7   NEUTSPOLYS 70.90 71.50 69.20  --    LYMPHOCYTES 18.50* 19.90* 21.90*  --    MONOCYTES 7.70 5.80 6.40  --    EOSINOPHILS 1.50 1.40 1.50  --    BASOPHILS 0.80 1.00 0.80  --    ASTSGOT 26 49* 46*  --    ALTSGPT 28 41 42  --    ALKPHOSPHAT 59 67 53  --    TBILIRUBIN 0.6 0.8 0.5  --      Recent Labs     08/16/23 1334 08/17/23 0013 08/18/23 0037   RBC 3.84* 3.17* 2.63*   HEMOGLOBIN 11.4* 9.6* 7.9*   HEMATOCRIT 34.7* 29.1* 24.1*   PLATELETCT 154* 101* 113*   PROTHROMBTM 14.8*  --   --    APTT 28.9  --   --    INR 1.17*  --   --      Recent Results (from the past 24 hour(s))   CBC WITHOUT DIFFERENTIAL    Collection Time: 08/18/23 12:37 AM   Result Value Ref Range    WBC 5.7 4.8 - 10.8 K/uL    RBC 2.63 (L) 4.70 - 6.10 M/uL     Hemoglobin 7.9 (L) 14.0 - 18.0 g/dL    Hematocrit 24.1 (L) 42.0 - 52.0 %    MCV 91.6 81.4 - 97.8 fL    MCH 30.0 27.0 - 33.0 pg    MCHC 32.8 32.3 - 36.5 g/dL    RDW 47.3 35.9 - 50.0 fL    Platelet Count 113 (L) 164 - 446 K/uL    MPV 13.1 (H) 9.0 - 12.9 fL   Basic Metabolic Panel    Collection Time: 08/18/23 12:37 AM   Result Value Ref Range    Sodium 137 135 - 145 mmol/L    Potassium 4.2 3.6 - 5.5 mmol/L    Chloride 106 96 - 112 mmol/L    Co2 22 20 - 33 mmol/L    Glucose 134 (H) 65 - 99 mg/dL    Bun 22 8 - 22 mg/dL    Creatinine 0.87 0.50 - 1.40 mg/dL    Calcium 7.4 (L) 8.5 - 10.5 mg/dL    Anion Gap 9.0 7.0 - 16.0   ESTIMATED GFR    Collection Time: 08/18/23 12:37 AM   Result Value Ref Range    GFR (CKD-EPI) 96 >60 mL/min/1.73 m 2       Radiology Review:  CTA ABDOMEN PELVIS W & W/O POST PROCESS   Final Result      1.  No evidence of active arterial gastrointestinal hemorrhage.   2.  Likely enteritis.   3.  Cirrhosis with 4.6 cm hepatic mass, likely HCC. Recommend nonemergent MRI hepatic protocol.   4.  Atherosclerotic changes. No aneurysm or dissection.   5.  Punctate, nonobstructing left renal stone.   6.  Cholelithiasis.      Study unavailable for interpretation until 8/16/2023 5:59 PM due to unknown issue with hospital PACS system.      DX-CHEST-PORTABLE (1 VIEW)   Final Result      No acute cardiac or pulmonary abnormalities are identified.      MR-ABDOMEN-WITH & W/O    (Results Pending)         MDM (Data Review):   -Records reviewed and summarized in current documentation  -I personally reviewed and interpreted the laboratory results  -I personally reviewed the radiology images    Assessment/Recommendations:  63-year-old male with no significant GI history presented via the emergency room with newly diagnosed cirrhosis, liver lesions, GI bleeding.  He underwent EGD which revealed esophageal varices status post 7 banding, portal hypertensive gastropathy.  Hep C antibody reactive, PCR pending.  MRI liver protocol  reveals 4.6 cm mass in the right hepatic lobe consistent with HCC and cirrhosis.  Also revealed cholelithiasis.    Recommendations:  Hemoglobin drifting down, will continue close monitoring for bleeding   Trend H&H, transfuse for hemoglobin less than 7  PPI twice daily  Complete 72 hours of octreotide  Complete ceftriaxone for cirrhotic bleeding  Advance to GI soft diet  Follow tumor markers and hep C PCR  Needs hepatobiliary surgery consult for consideration of resection of HCC    GI team will continue to follow    Plan of care discussed with patient, RN, Dr. Zendejas    Core Quality Measures   Reviewed items::  Labs, Medications and Radiology reports reviewed

## 2023-08-18 NOTE — CARE PLAN
The patient is Stable - Low risk of patient condition declining or worsening    Shift Goals  Clinical Goals: monitor VS, monitor labs, promote self care, encourage self care  Patient Goals: get better soon  Family Goals: GIOVANNI    Progress made toward(s) clinical / shift goals:    Problem: Pain - Standard  Goal: Alleviation of pain or a reduction in pain to the patient’s comfort goal  Outcome: Progressing     Problem: Knowledge Deficit - Standard  Goal: Patient and family/care givers will demonstrate understanding of plan of care, disease process/condition, diagnostic tests and medications  Outcome: Progressing       Patient is not progressing towards the following goals:

## 2023-08-18 NOTE — DISCHARGE PLANNING
"Case Management Discharge Planning    Admission Date: 8/16/2023  GMLOS: 4.9  ALOS: 2    6-Clicks ADL Score: 24  6-Clicks Mobility Score: 23      Anticipated Discharge Dispo: Discharge Disposition: Discharged to home/self care (01)      RN CM visited pt at bedside to complete report, pt requested CM return later as he is not feeling well and \"just had an episode\".      "

## 2023-08-18 NOTE — THERAPY
"Occupational Therapy   Initial Evaluation     Patient Name: Tyrone Giraldo  Age:  63 y.o., Sex:  male  Medical Record #: [id number]  Today's Date: 8/18/2023     Precautions: Fall Risk    Assessment    Patient is 63 y.o. male with h/o MI, DLD, CAD, meth abuse, admitted with melena, weakness. EGD showed esophageal varices. Pt seen for OT eval and treatment. Pt reported minimal time OOB since admit. Educated on pathology of bed rest. Agreeable to mobilize to bathroom for oral care this session. Subjective report of dizziness, diaphoresis EOB. BP 98/54. Deferred mobility given presentation, but pt participated in LB dressing, oral care, STS and side stepping (all with supv). Educated pt on use of shower chair for fall reduction in home setting. Emphasis on caution with mobility, standing activity if any hypotensive symptoms present. RN informed of results. Pt is limited by activity intolerance due to symptomatic hypotension; otherwise appears to be close to baseline function from OT standpoint. Patient will not be actively followed for occupational therapy services at this time, however may be seen if requested by physician for 1 more visit within 30 days to address any discharge or equipment needs.      Plan    Occupational Therapy Initial Treatment Plan   Duration: Discharge Needs Only    DC Equipment Recommendations: Tub / Shower Seat  Discharge Recommendations: Anticipate that the patient will have no further occupational therapy needs after discharge from the hospital     Subjective    \"I'm pretty dizzy and feel clammy.\"     Objective       08/18/23 1510   Prior Living Situation   Prior Services None;Home-Independent   Housing / Facility 1 Story Apartment / Condo   Steps Into Home 0   Steps In Home 0   Bathroom Set up Bathtub / Shower Combination;Shower Curtain   Equipment Owned Other (Comments)  (pt reports he may have shower chair but is unsure)   Lives with - Patient's Self Care Capacity Unrelated Adult "   Comments Pt shares apt with roommate and her son. Reports roommate spends most of her time at her boyfriend's home and her son is more of a burden than a help.   Prior Level of ADL Function   Comments Pt was independent with BADL PTA   Prior Level of IADL Function   Prior Level Of Mobility Independent Without Device in Community;Independent Without Device in Home   Driving / Transportation Walks   Occupation (Pre-Hospital Vocational) Retired Due To Age   Comments Pt was independent with I-ADL and functional mobility PTA   Vitals   Patient BP Position Sitting   Blood Pressure 98/54   Vitals Comments Subjective report of dizziness, feeling clammy EOB   Cognition    Cognition / Consciousness WDL   Level of Consciousness Alert   Comments pleasant & cooperative   Active ROM Upper Body   Active ROM Upper Body  WDL   Strength Upper Body   Upper Body Strength  WDL   Coordination Upper Body   Coordination WDL   Balance Assessment   Sitting Balance (Static) Good   Sitting Balance (Dynamic) Fair +   Standing Balance (Static) Fair   Standing Balance (Dynamic) Fair   Weight Shift Sitting Good   Weight Shift Standing Fair   Comments no AD   Bed Mobility    Supine to Sit Supervised   Sit to Supine Supervised   Scooting Supervised   ADL Assessment   Grooming Supervision;Seated  (oral care)   Lower Body Dressing Supervision  (doff/don B socks EOB)   Toileting   (declined due to no need)   Functional Mobility   Sit to Stand Supervised   Mobility Supine > < EOB, STS, side stepping  (further mobility deferred due to hypotension)   Activity Tolerance   Sitting in Chair deferred due to hypotension   Sitting Edge of Bed 10 min   Standing <1 min   Education Group   Home Safety Patient Response Patient;Acceptance;Explanation;Verbal Demonstration  (use of shower chair for fall reduction)   Pathology of Bedrest Patient Response Patient;Acceptance;Explanation;Verbal Demonstration  (instructed to be up for all meals (as tolerance allows))

## 2023-08-18 NOTE — PROGRESS NOTES
Assumed care of pt. Bedside report received from RN. Pt was updated on plan of care. AOx4. Pt pain level 0/10, states minor abd.  discomfort but denies any interventions or any numerical value to it. Pt is on telemetry, SR 74. Call light, phone and personal belongings in reach. Bed frame alarm on and working properly, bed in lowest position, and locked.

## 2023-08-18 NOTE — PROGRESS NOTES
[facility] Medicine Daily Progress Note    Date of Service  8/18/2023    Chief Complaint  Tyrone Giraldo is a 63 y.o. male admitted 8/16/2023 with melena.    Hospital Course  Tyrone is a 63 y.o. male who presented 8/16/2023 with concerns of weakness and dark stools.  Past medical history significant for coronary artery disease and MI x2 in 2006 and 2009, chronic sciatica pain.     Patient states that he has been feeling weak and dizzy for the last couple of days.  Patient presented to the ED yesterday on 8/15/2023 for similar complaints.  At the time he denied any melena or hematochezia.  Patient was hemodynamically stable.  After appropriate work-up, he was discharged home.  This morning, patient had a bowel movement with melanotic dark stools was dark red tinge.  He continued to feel weak and dizzy.  Therefore, he presented back to the ED.  Patient felt nauseous and sick to his stomach with a queasy feeling.  Patient denies any similar episodes of black, tarry stools in the past.  This is the first occurrence.  Denies any use of blood thinners.  Of note, patient is on aspirin 325 mg.  Denies any NSAID use, no alcohol use.  GI was contacted by the ED physician: Recommend n.p.o. after midnight, antibiotic for SBP prophylaxis, IV PPI, and octreotide drip.  Patient will be scheduled for endoscopy on 8/17/2023.     In the ED, patient appears to be hemodynamically stable.  Fecal occult blood test positive.  No active bleed.  TRINO resulted in black stools with dark red tinge on the glove.  CBC concerning for normocytic anemia.  CMP shows a slight elevation in AST.  Cardiac markers negative.  TEG scan unremarkable.  CT abdomen pelvis with contrast shows cirrhosis of the liver with a 4.6 cm hepatic mass, requiring further MRI imaging.    Interval Problem Update  No acute events overnight.  Patient continues to feel well, denies bowel movement since admission.  Continue octreotide x72 hours, completes 10pm  tomorrow.  Continue IV PPI BID, continue ceftriaxone for antibiotic prophylaxis in cirrhotic patient with GI bleeding.  MRI liver mass still pending.  Hgb 7.9, monitor. Repeat H/H 3pm today. Transfuse for Hgb<7.  CLD for now unless GI states otherwise.      I have discussed this patient's plan of care and discharge plan at IDT rounds today with Case Management, Nursing, Nursing leadership, and other members of the IDT team.    Consultants/Specialty  GI    Code Status  Full Code    Disposition  The patient is not medically cleared for discharge to home or a post-acute facility.  Anticipate discharge to: home with close outpatient follow-up    I have placed the appropriate orders for post-discharge needs.    Review of Systems  Review of Systems   Constitutional:  Negative for chills and fever.   Eyes:  Negative for blurred vision and pain.   Respiratory:  Negative for cough and shortness of breath.    Cardiovascular:  Negative for chest pain, palpitations and leg swelling.   Gastrointestinal:  Positive for blood in stool and melena. Negative for abdominal pain, constipation, diarrhea, nausea and vomiting.   Genitourinary:  Negative for dysuria and urgency.   Musculoskeletal:  Negative for back pain and falls.   Skin:  Negative for itching and rash.   Neurological:  Positive for weakness. Negative for dizziness, sensory change, focal weakness and headaches.   Psychiatric/Behavioral:  Negative for substance abuse. The patient does not have insomnia.         Physical Exam  Temp:  [36.2 °C (97.2 °F)-37.2 °C (99 °F)] 36.2 °C (97.2 °F)  Pulse:  [72-81] 77  Resp:  [12-20] 18  BP: (105-139)/(54-78) 120/65  SpO2:  [90 %-98 %] 90 %    Physical Exam  Constitutional:       General: He is not in acute distress.     Appearance: He is not ill-appearing.   HENT:      Head: Normocephalic and atraumatic.      Right Ear: External ear normal.      Left Ear: External ear normal.      Mouth/Throat:      Pharynx: No oropharyngeal exudate or  posterior oropharyngeal erythema.   Eyes:      Extraocular Movements: Extraocular movements intact.      Pupils: Pupils are equal, round, and reactive to light.   Cardiovascular:      Rate and Rhythm: Normal rate and regular rhythm.      Pulses: Normal pulses.      Heart sounds: Normal heart sounds.   Pulmonary:      Effort: Pulmonary effort is normal. No respiratory distress.      Breath sounds: Normal breath sounds. No wheezing or rales.   Abdominal:      General: Bowel sounds are normal. There is no distension.      Palpations: Abdomen is soft.      Tenderness: There is no abdominal tenderness. There is no guarding.   Musculoskeletal:         General: No swelling or tenderness.      Cervical back: Normal range of motion and neck supple.      Right lower leg: No edema.      Left lower leg: No edema.   Skin:     General: Skin is warm and dry.   Neurological:      General: No focal deficit present.      Mental Status: He is oriented to person, place, and time.      Cranial Nerves: No cranial nerve deficit.      Sensory: No sensory deficit.      Motor: No weakness.   Psychiatric:         Mood and Affect: Mood normal.         Behavior: Behavior normal.         Fluids    Intake/Output Summary (Last 24 hours) at 8/18/2023 1257  Last data filed at 8/17/2023 1800  Gross per 24 hour   Intake 532 ml   Output --   Net 532 ml       Laboratory  Recent Labs     08/16/23  1334 08/17/23  0013 08/18/23  0037 08/18/23  1112   WBC 7.1 6.6 5.7  --    RBC 3.84* 3.17* 2.63*  --    HEMOGLOBIN 11.4* 9.6* 7.9* 7.4*   HEMATOCRIT 34.7* 29.1* 24.1* 22.8*   MCV 90.4 91.8 91.6  --    MCH 29.7 30.3 30.0  --    MCHC 32.9 33.0 32.8  --    RDW 45.3 46.8 47.3  --    PLATELETCT 154* 101* 113*  --    MPV 12.2 12.7 13.1*  --      Recent Labs     08/16/23  1334 08/17/23  0013 08/18/23  0037   SODIUM 141 139 137   POTASSIUM 4.0 4.4 4.2   CHLORIDE 107 108 106   CO2 23 21 22   GLUCOSE 127* 121* 134*   BUN 31* 30* 22   CREATININE 0.73 0.73 0.87   CALCIUM  8.8 8.2* 7.4*     Recent Labs     08/16/23  1334   APTT 28.9   INR 1.17*         Recent Labs     08/17/23  0013   TRIGLYCERIDE 95   HDL 17*   LDL 33       Imaging  MR-ABDOMEN-WITH & W/O   Final Result         1. The 4.6 cm mass in the right hepatic lobe with characteristics consistent with HCC.   2. Cirrhotic appearing liver.   3. Cholelithiasis.         CTA ABDOMEN PELVIS W & W/O POST PROCESS   Final Result      1.  No evidence of active arterial gastrointestinal hemorrhage.   2.  Likely enteritis.   3.  Cirrhosis with 4.6 cm hepatic mass, likely HCC. Recommend nonemergent MRI hepatic protocol.   4.  Atherosclerotic changes. No aneurysm or dissection.   5.  Punctate, nonobstructing left renal stone.   6.  Cholelithiasis.      Study unavailable for interpretation until 8/16/2023 5:59 PM due to unknown issue with hospital PACS system.      DX-CHEST-PORTABLE (1 VIEW)   Final Result      No acute cardiac or pulmonary abnormalities are identified.           Assessment/Plan  * GI bleed- (present on admission)  Assessment & Plan  Patient presentation after acute episode of dark red/black muddy stool at home.  TRINO showed dark stool with retention on glove residue.  FOBT positive.  Patient hemodynamically stable, no indication for emergent endoscopy.  GI consulted.  CT imaging shows 4.6 cm hepatic mass with no acute bleed.  Patient has never had an endoscopy  S/p EGD showing esophageal varices, portal hypertensive gastropathy with oozing and blood remannts; s/p banding of varices  Continue octreotide drip x72 hours  Continue IV PPI BID, ceftriaxone  Clear liquid diet for now per GI  GI following, appreciate recommendations  Will need referral to outpatient GI and PCP for follow up    Liver mass- (present on admission)  Assessment & Plan  MRI ordered for further evaluation.    History of MI (myocardial infarction)  Assessment & Plan  History of 2 myocardial infarctions in 2006 and thousand 9, s/p stent placement.  Patient  takes aspirin 325 mg at home.  No acute episode of chest pain, or troponins.  -Discontinue aspirin in the setting of active GI bleed.  Reassess at the time of discharge, may decrease dosing to 81 mg  -Hemoglobin A1c  -Lipid panel  -Day team to consider establishing patient with cardiology at the time of discharge.    Weakness  Assessment & Plan  Weakness for the past 2-3 days.  Most likely in the setting of anemia with active GI bleed.  -Physical therapy and Occupational Therapy referrals  -Continue to monitor    Acute blood loss anemia- (present on admission)  Assessment & Plan  Normocytic anemia as per ED labs.  Most likely in the setting of GI bleed.   Monitor H/H q12h, transfuse for Hgb<7  No signs of active bleeding         VTE prophylaxis:   SCDs/TEDs

## 2023-08-19 LAB
AFP-TM SERPL-MCNC: 9 NG/ML (ref 0–9)
ERYTHROCYTE [DISTWIDTH] IN BLOOD BY AUTOMATED COUNT: 45.3 FL (ref 35.9–50)
ERYTHROCYTE [DISTWIDTH] IN BLOOD BY AUTOMATED COUNT: 46.9 FL (ref 35.9–50)
HCT VFR BLD AUTO: 20.1 % (ref 42–52)
HCT VFR BLD AUTO: 22 % (ref 42–52)
HCT VFR BLD AUTO: 22.7 % (ref 42–52)
HCT VFR BLD AUTO: 23.8 % (ref 42–52)
HGB BLD-MCNC: 6.5 G/DL (ref 14–18)
HGB BLD-MCNC: 7.2 G/DL (ref 14–18)
HGB BLD-MCNC: 7.7 G/DL (ref 14–18)
HGB BLD-MCNC: 7.7 G/DL (ref 14–18)
MCH RBC QN AUTO: 30.4 PG (ref 27–33)
MCH RBC QN AUTO: 30.6 PG (ref 27–33)
MCHC RBC AUTO-ENTMCNC: 32.3 G/DL (ref 32.3–36.5)
MCHC RBC AUTO-ENTMCNC: 33.9 G/DL (ref 32.3–36.5)
MCV RBC AUTO: 90.1 FL (ref 81.4–97.8)
MCV RBC AUTO: 93.9 FL (ref 81.4–97.8)
PLATELET # BLD AUTO: 101 K/UL (ref 164–446)
PLATELET # BLD AUTO: 121 K/UL (ref 164–446)
PLATELETS.RETICULATED NFR BLD AUTO: 11.7 % (ref 0.6–13.1)
PLATELETS.RETICULATED NFR BLD AUTO: 21.3 % (ref 0.6–13.1)
PMV BLD AUTO: 11.9 FL (ref 9–12.9)
PMV BLD AUTO: 13.5 FL (ref 9–12.9)
RBC # BLD AUTO: 2.14 M/UL (ref 4.7–6.1)
RBC # BLD AUTO: 2.52 M/UL (ref 4.7–6.1)
WBC # BLD AUTO: 6.9 K/UL (ref 4.8–10.8)
WBC # BLD AUTO: 7.7 K/UL (ref 4.8–10.8)

## 2023-08-19 PROCEDURE — 99232 SBSQ HOSP IP/OBS MODERATE 35: CPT | Performed by: NURSE PRACTITIONER

## 2023-08-19 PROCEDURE — 87522 HEPATITIS C REVRS TRNSCRPJ: CPT

## 2023-08-19 PROCEDURE — 36415 COLL VENOUS BLD VENIPUNCTURE: CPT

## 2023-08-19 PROCEDURE — 700101 HCHG RX REV CODE 250

## 2023-08-19 PROCEDURE — C9113 INJ PANTOPRAZOLE SODIUM, VIA: HCPCS

## 2023-08-19 PROCEDURE — 770020 HCHG ROOM/CARE - TELE (206)

## 2023-08-19 PROCEDURE — 85055 RETICULATED PLATELET ASSAY: CPT | Mod: 91

## 2023-08-19 PROCEDURE — 85018 HEMOGLOBIN: CPT | Mod: 91

## 2023-08-19 PROCEDURE — 30233N1 TRANSFUSION OF NONAUTOLOGOUS RED BLOOD CELLS INTO PERIPHERAL VEIN, PERCUTANEOUS APPROACH: ICD-10-PCS

## 2023-08-19 PROCEDURE — P9016 RBC LEUKOCYTES REDUCED: HCPCS

## 2023-08-19 PROCEDURE — 36430 TRANSFUSION BLD/BLD COMPNT: CPT

## 2023-08-19 PROCEDURE — 99232 SBSQ HOSP IP/OBS MODERATE 35: CPT | Performed by: STUDENT IN AN ORGANIZED HEALTH CARE EDUCATION/TRAINING PROGRAM

## 2023-08-19 PROCEDURE — 700111 HCHG RX REV CODE 636 W/ 250 OVERRIDE (IP)

## 2023-08-19 PROCEDURE — 700105 HCHG RX REV CODE 258

## 2023-08-19 PROCEDURE — 85027 COMPLETE CBC AUTOMATED: CPT | Mod: 91

## 2023-08-19 PROCEDURE — 86923 COMPATIBILITY TEST ELECTRIC: CPT

## 2023-08-19 PROCEDURE — 85014 HEMATOCRIT: CPT | Mod: 91

## 2023-08-19 RX ORDER — SODIUM CHLORIDE 9 MG/ML
INJECTION, SOLUTION INTRAVENOUS CONTINUOUS
Status: ACTIVE | OUTPATIENT
Start: 2023-08-19 | End: 2023-08-19

## 2023-08-19 RX ADMIN — PANTOPRAZOLE SODIUM 40 MG: 40 INJECTION, POWDER, FOR SOLUTION INTRAVENOUS at 16:15

## 2023-08-19 RX ADMIN — SODIUM CHLORIDE: 9 INJECTION, SOLUTION INTRAVENOUS at 04:00

## 2023-08-19 RX ADMIN — CEFTRIAXONE SODIUM 2000 MG: 10 INJECTION, POWDER, FOR SOLUTION INTRAVENOUS at 16:15

## 2023-08-19 RX ADMIN — PANTOPRAZOLE SODIUM 40 MG: 40 INJECTION, POWDER, FOR SOLUTION INTRAVENOUS at 04:44

## 2023-08-19 ASSESSMENT — ENCOUNTER SYMPTOMS
PALPITATIONS: 0
BLOOD IN STOOL: 1
DIZZINESS: 0
FALLS: 0
HEARTBURN: 0
INSOMNIA: 0
ABDOMINAL PAIN: 0
BLURRED VISION: 0
WEAKNESS: 0
WEAKNESS: 1
CHILLS: 0
EYE PAIN: 0
FOCAL WEAKNESS: 0
VOMITING: 0
COUGH: 0
CONSTIPATION: 0
BLOOD IN STOOL: 0
DEPRESSION: 0
FEVER: 0
SHORTNESS OF BREATH: 0
SENSORY CHANGE: 0
DIARRHEA: 0
NAUSEA: 0
HEADACHES: 0
BACK PAIN: 0

## 2023-08-19 ASSESSMENT — PAIN DESCRIPTION - PAIN TYPE
TYPE: ACUTE PAIN

## 2023-08-19 ASSESSMENT — LIFESTYLE VARIABLES: SUBSTANCE_ABUSE: 0

## 2023-08-19 NOTE — PROGRESS NOTES
NOC HOSPITALIST CROSS COVER    Notified by RN regarding hemoglobin of 6.5, down from 7.6 yesterday.  The patient is asymptomatic.      Vitals:    08/19/23 0500   BP: 109/66   Pulse: 78   Resp: 16   Temp:    SpO2: 96%          Plan:  #Anemia  -Transfuse 1 unit PRBC  -Monitor vital signs per nursing policy  -Monitor for transfusion reactions to include shortness of breath, itching, hypotension, wheezing, fevers, or chills  -Repeat CBC posttransfusion    -----------------------------------------------------------------------------------------------------------    Electronically signed by:  Yaneli Owens, DNP, APRN, SHWETAP-BC  Hospitalist Services

## 2023-08-19 NOTE — PROGRESS NOTES
Multiple episodes of low BP through shift, followed by diaphoresis. He is quick to recover with bp. Serial H&Hs ordered and monitoring.

## 2023-08-19 NOTE — PROGRESS NOTES
Monitor Summary  Rhythm: SR, ST  Rate:   Ectopy: COUP, TRIGAM, PVC  0.13 / 0.09 / 0.40

## 2023-08-19 NOTE — PROGRESS NOTES
Assumed care of patient, received bedside report from NOC RN. Patient is A&O X 4. Pain 0/10. SBP low overnight systolic in the 90's on RA this AM. On tele monitor, SR 88. POC discussed with patient and he verbalized understanding. Call light within reach and fall precautions in place. Bed locked and in lowest position.

## 2023-08-19 NOTE — PROGRESS NOTES
[facility] Medicine Daily Progress Note    Date of Service  8/19/2023    Chief Complaint  Tyrone Giraldo is a 63 y.o. male admitted 8/16/2023 with melena.    Hospital Course  Tyrone is a 63 y.o. male who presented 8/16/2023 with concerns of weakness and dark stools.  Past medical history significant for coronary artery disease and MI x2 in 2006 and 2009, chronic sciatica pain.     Patient states that he has been feeling weak and dizzy for the last couple of days.  Patient presented to the ED yesterday on 8/15/2023 for similar complaints.  At the time he denied any melena or hematochezia.  Patient was hemodynamically stable.  After appropriate work-up, he was discharged home.  This morning, patient had a bowel movement with melanotic dark stools was dark red tinge.  He continued to feel weak and dizzy.  Therefore, he presented back to the ED.  Patient felt nauseous and sick to his stomach with a queasy feeling.  Patient denies any similar episodes of black, tarry stools in the past.  This is the first occurrence.  Denies any use of blood thinners.  Of note, patient is on aspirin 325 mg.  Denies any NSAID use, no alcohol use.  GI was contacted by the ED physician: Recommend n.p.o. after midnight, antibiotic for SBP prophylaxis, IV PPI, and octreotide drip.  Patient will be scheduled for endoscopy on 8/17/2023.     In the ED, patient appears to be hemodynamically stable.  Fecal occult blood test positive.  No active bleed.  TRINO resulted in black stools with dark red tinge on the glove.  CBC concerning for normocytic anemia.  CMP shows a slight elevation in AST.  Cardiac markers negative.  TEG scan unremarkable.  CT abdomen pelvis with contrast shows cirrhosis of the liver with a 4.6 cm hepatic mass, requiring further MRI imaging.    Interval Problem Update  Patient with Hgb 6.5 overnight, received 1 unit RBC transfusion.  Hgb up to 7.6 post transfusion, no signs of active bleeding.  Patient feels well.  Continue H/H  checks q6h per GI.  Continue octreotide drip, IV PPI BID, ceftriaxone.  Hepatobiliary surgery consulted for liver mass.      I have discussed this patient's plan of care and discharge plan at IDT rounds today with Case Management, Nursing, Nursing leadership, and other members of the IDT team.    Consultants/Specialty  GI    Code Status  Full Code    Disposition  The patient is not medically cleared for discharge to home or a post-acute facility.      I have placed the appropriate orders for post-discharge needs.    Review of Systems  Review of Systems   Constitutional:  Negative for chills and fever.   Eyes:  Negative for blurred vision and pain.   Respiratory:  Negative for cough and shortness of breath.    Cardiovascular:  Negative for chest pain, palpitations and leg swelling.   Gastrointestinal:  Positive for blood in stool and melena. Negative for abdominal pain, constipation, diarrhea, nausea and vomiting.   Genitourinary:  Negative for dysuria and urgency.   Musculoskeletal:  Negative for back pain and falls.   Skin:  Negative for itching and rash.   Neurological:  Positive for weakness. Negative for dizziness, sensory change, focal weakness and headaches.   Psychiatric/Behavioral:  Negative for substance abuse. The patient does not have insomnia.         Physical Exam  Temp:  [36.8 °C (98.2 °F)-38.3 °C (100.9 °F)] 37.2 °C (99 °F)  Pulse:  [69-97] 77  Resp:  [14-21] 14  BP: ()/(39-66) 99/50  SpO2:  [89 %-96 %] 94 %    Physical Exam  Constitutional:       General: He is not in acute distress.     Appearance: He is not ill-appearing.   HENT:      Head: Normocephalic and atraumatic.      Right Ear: External ear normal.      Left Ear: External ear normal.      Mouth/Throat:      Pharynx: No oropharyngeal exudate or posterior oropharyngeal erythema.   Eyes:      Extraocular Movements: Extraocular movements intact.      Pupils: Pupils are equal, round, and reactive to light.   Cardiovascular:      Rate and  Rhythm: Normal rate and regular rhythm.      Pulses: Normal pulses.      Heart sounds: Normal heart sounds.   Pulmonary:      Effort: Pulmonary effort is normal. No respiratory distress.      Breath sounds: Normal breath sounds. No wheezing or rales.   Abdominal:      General: Bowel sounds are normal. There is no distension.      Palpations: Abdomen is soft.      Tenderness: There is no abdominal tenderness. There is no guarding.   Musculoskeletal:         General: No swelling or tenderness.      Cervical back: Normal range of motion and neck supple.      Right lower leg: No edema.      Left lower leg: No edema.   Skin:     General: Skin is warm and dry.   Neurological:      General: No focal deficit present.      Mental Status: He is oriented to person, place, and time.      Cranial Nerves: No cranial nerve deficit.      Sensory: No sensory deficit.      Motor: No weakness.   Psychiatric:         Mood and Affect: Mood normal.         Behavior: Behavior normal.         Fluids    Intake/Output Summary (Last 24 hours) at 8/19/2023 1442  Last data filed at 8/19/2023 0710  Gross per 24 hour   Intake 998.17 ml   Output --   Net 998.17 ml       Laboratory  Recent Labs     08/18/23  0037 08/18/23  1112 08/18/23  1808 08/19/23  0057 08/19/23  0804   WBC 5.7  --   --  6.9 7.7   RBC 2.63*  --   --  2.14* 2.52*   HEMOGLOBIN 7.9*   < > 7.6* 6.5* 7.7*   HEMATOCRIT 24.1*   < > 22.6* 20.1* 22.7*   MCV 91.6  --   --  93.9 90.1   MCH 30.0  --   --  30.4 30.6   MCHC 32.8  --   --  32.3 33.9   RDW 47.3  --   --  46.9 45.3   PLATELETCT 113*  --   --  121* 101*   MPV 13.1*  --   --  13.5* 11.9    < > = values in this interval not displayed.     Recent Labs     08/17/23  0013 08/18/23  0037   SODIUM 139 137   POTASSIUM 4.4 4.2   CHLORIDE 108 106   CO2 21 22   GLUCOSE 121* 134*   BUN 30* 22   CREATININE 0.73 0.87   CALCIUM 8.2* 7.4*               Recent Labs     08/17/23  0013   TRIGLYCERIDE 95   HDL 17*   LDL 33        Imaging  MR-ABDOMEN-WITH & W/O   Final Result         1. The 4.6 cm mass in the right hepatic lobe with characteristics consistent with HCC.   2. Cirrhotic appearing liver.   3. Cholelithiasis.         CTA ABDOMEN PELVIS W & W/O POST PROCESS   Final Result      1.  No evidence of active arterial gastrointestinal hemorrhage.   2.  Likely enteritis.   3.  Cirrhosis with 4.6 cm hepatic mass, likely HCC. Recommend nonemergent MRI hepatic protocol.   4.  Atherosclerotic changes. No aneurysm or dissection.   5.  Punctate, nonobstructing left renal stone.   6.  Cholelithiasis.      Study unavailable for interpretation until 8/16/2023 5:59 PM due to unknown issue with hospital PACS system.      DX-CHEST-PORTABLE (1 VIEW)   Final Result      No acute cardiac or pulmonary abnormalities are identified.           Assessment/Plan  * GI bleed- (present on admission)  Assessment & Plan  Patient presentation after acute episode of dark red/black muddy stool at home.  TRINO showed dark stool with retention on glove residue.  FOBT positive.  Patient hemodynamically stable, no indication for emergent endoscopy.  GI consulted.  CT imaging shows 4.6 cm hepatic mass with no acute bleed.  Patient has never had an endoscopy  S/p EGD showing esophageal varices, portal hypertensive gastropathy with oozing and blood remannts; s/p banding of varices  Continue octreotide drip x72 hours  Continue IV PPI BID, ceftriaxone  Clear liquid diet for now per GI  GI following, appreciate recommendations    Liver mass- (present on admission)  Assessment & Plan  MRI shows 4.6 cm mass likely HCC  Hepatobiliary surgery consulted    History of MI (myocardial infarction)  Assessment & Plan  History of 2 myocardial infarctions in 2006 and thousand 9, s/p stent placement.  Patient takes aspirin 325 mg at home.  No acute episode of chest pain, or troponins.  -Discontinue aspirin in the setting of active GI bleed.  Reassess at the time of discharge, may  decrease dosing to 81 mg  -Hemoglobin A1c  -Lipid panel    Weakness  Assessment & Plan  Weakness for the past 2-3 days.  Most likely in the setting of anemia with active GI bleed.  -Physical therapy and Occupational Therapy referrals  -Continue to monitor    Acute blood loss anemia- (present on admission)  Assessment & Plan  Normocytic anemia as per ED labs.  Most likely in the setting of GI bleed.   Monitor H/H q6h, transfuse for Hgb<7  No signs of active bleeding  Requiring 1 unit RBC transfusion 8/19 AM         VTE prophylaxis:   SCDs/TEDs

## 2023-08-19 NOTE — PROGRESS NOTES
Assumed care of pt. Bedside report received from RN. Pt was updated on plan of care. AOx4. Pt pain level 0/10. Call light, phone and personal belongings in reach. Bed frame alarm on and working properly, bed in lowest position, and locked. Pt moved to chair for meal, then back to bed post meal.

## 2023-08-19 NOTE — PROGRESS NOTES
..Gastroenterology Progress Note               Author:  GISELA Jordan Date & Time Created: 8/19/2023 7:47 AM       Patient ID:  Name:             Tyrone Giraldo  YOB: 1960  Age:                 63 y.o.  male  MRN:               [id number]        Medical Decision Making, by Problem:  Active Hospital Problems    Diagnosis     Liver mass [R16.0]     GI bleed [K92.2]     Acute blood loss anemia [D62]     Weakness [R53.1]     History of MI (myocardial infarction) [I25.2]            Presenting Chief Complaint:  Upper GI bleeding      Interval History:  8/19/2023: Patient seen this morning, I woke him from sleep, he does not have any complaints.  Denies having a bowel movement ever 1 is charted overnight. Hgb down to 6.5 overnight from 7.6 yesterday requiring 1 unit PRBC.  Platelets 121.  Recheck hemoglobin 7.7    8/18/2023: Patient seen and examined.  Reports feeling hungry.  No bowel movement but is having dark black smears per nursing. VSS. Hgb 7.9<9.6. Tumor markers not ordered, placed.  MRI consistent with HCC, Hep C PCR pending    8/17/2023: EGD with Dr. Zendejas:  Esophageal varices, 3 columns, mod size, no active bleeding now, s/p 7 banding due to size.   Portal hypertensive gastropathy with scattered oozing in body, no treatment provided. Could be bleeding more before.   No overt gastric varices.   Some remaining blood in fundus.   Duodenum grossly normal.         Hospital Medications:  Current Facility-Administered Medications   Medication Dose Frequency Provider Last Rate Last Admin    NS infusion   Continuous JOVAN ManRPinedaNPineda 30 mL/hr at 08/19/23 0400 New Bag at 08/19/23 0400    labetalol (Normodyne/Trandate) injection 10 mg  10 mg Q4HRS PRN Pop Alves M.D.        cefTRIAXone (Rocephin) syringe 2,000 mg  2,000 mg Q24HRS Pop Alves M.D.   2,000 mg at 08/18/23 1740    pantoprazole (Protonix) injection 40 mg  40 mg BID Pop Alves M.D.   40 mg at 08/19/23 0444     octreotide (SandoSTATIN) 1,250 mcg in  mL Infusion  50 mcg/hr Continuous Everardo Pearl M.D. 10 mL/hr at 08/18/23 1550 50 mcg/hr at 08/18/23 1550   Last reviewed on 8/16/2023  7:17 PM by Mary Bryan [facility]       Review of Systems:  Review of Systems   Constitutional:  Positive for malaise/fatigue. Negative for chills and fever.   HENT:  Negative for hearing loss.    Eyes:  Negative for blurred vision.   Respiratory:  Negative for cough and shortness of breath.    Cardiovascular:  Negative for chest pain and leg swelling.   Gastrointestinal:  Negative for blood in stool, constipation, diarrhea, heartburn, melena, nausea and vomiting.   Genitourinary:  Negative for dysuria.   Musculoskeletal:  Negative for back pain.   Skin:  Negative for rash.   Neurological:  Negative for dizziness and weakness.   Psychiatric/Behavioral:  Negative for depression.          Vital signs:  Weight/BMI: Body mass index is 22.57 kg/m².  /66   Pulse 78   Temp 37.2 °C (99 °F) (Temporal)   Resp 16   Ht 1.829 m (6')   Wt 75.5 kg (166 lb 7.2 oz)   SpO2 96%   Vitals:    08/19/23 0427 08/19/23 0432 08/19/23 0438 08/19/23 0500   BP: (!) 88/43 91/45 (!) 88/46 109/66   Pulse: 83 81 80 78   Resp: 16 16 16 16   Temp: 37 °C (98.6 °F) 37.5 °C (99.5 °F) 37.2 °C (99 °F)    TempSrc: Temporal Temporal Temporal    SpO2: 96% 96% 96% 96%   Weight:       Height:         Oxygen Therapy:  Pulse Oximetry: 96 %, O2 (LPM): 0, O2 Delivery Device: None - Room Air    Intake/Output Summary (Last 24 hours) at 8/19/2023 0747  Last data filed at 8/19/2023 0710  Gross per 24 hour   Intake 998.17 ml   Output --   Net 998.17 ml           Physical Exam:  Physical Exam  Vitals and nursing note reviewed.   Constitutional:       General: He is not in acute distress.     Appearance: Normal appearance. He is not ill-appearing.      Comments: Thin   HENT:      Head: Normocephalic and atraumatic.      Right Ear: External ear normal.      Left Ear: External ear  normal.      Nose: Nose normal.      Mouth/Throat:      Mouth: Mucous membranes are moist.      Pharynx: Oropharynx is clear.   Eyes:      General: No scleral icterus.  Cardiovascular:      Rate and Rhythm: Normal rate and regular rhythm.      Pulses: Normal pulses.      Heart sounds: Normal heart sounds.   Pulmonary:      Effort: Pulmonary effort is normal.      Breath sounds: Normal breath sounds.   Abdominal:      General: Abdomen is flat. Bowel sounds are normal. There is no distension.      Palpations: Abdomen is soft.   Musculoskeletal:         General: Normal range of motion.      Cervical back: Normal range of motion and neck supple.   Skin:     General: Skin is warm.      Capillary Refill: Capillary refill takes less than 2 seconds.      Coloration: Skin is pale.   Neurological:      Mental Status: He is alert and oriented to person, place, and time.   Psychiatric:         Mood and Affect: Mood normal.         Behavior: Behavior normal.             Labs:  Recent Labs     08/16/23 1334 08/17/23  0013 08/18/23  0037   SODIUM 141 139 137   POTASSIUM 4.0 4.4 4.2   CHLORIDE 107 108 106   CO2 23 21 22   BUN 31* 30* 22   CREATININE 0.73 0.73 0.87   MAGNESIUM  --  1.8  --    CALCIUM 8.8 8.2* 7.4*       Recent Labs     08/16/23  1334 08/17/23  0013 08/18/23  0037   ALTSGPT 41 42  --    ASTSGOT 49* 46*  --    ALKPHOSPHAT 67 53  --    TBILIRUBIN 0.8 0.5  --    LIPASE 16  --   --    GLUCOSE 127* 121* 134*       Recent Labs     08/16/23  1334 08/17/23  0013 08/18/23  0037 08/19/23  0057   WBC 7.1 6.6 5.7 6.9   NEUTSPOLYS 71.50 69.20  --   --    LYMPHOCYTES 19.90* 21.90*  --   --    MONOCYTES 5.80 6.40  --   --    EOSINOPHILS 1.40 1.50  --   --    BASOPHILS 1.00 0.80  --   --    ASTSGOT 49* 46*  --   --    ALTSGPT 41 42  --   --    ALKPHOSPHAT 67 53  --   --    TBILIRUBIN 0.8 0.5  --   --        Recent Labs     08/16/23  1334 08/17/23  0013 08/18/23  0037 08/18/23  1112 08/18/23  1808 08/19/23  0057   RBC 3.84* 3.17*  2.63*  --   --  2.14*   HEMOGLOBIN 11.4* 9.6* 7.9* 7.4* 7.6* 6.5*   HEMATOCRIT 34.7* 29.1* 24.1* 22.8* 22.6* 20.1*   PLATELETCT 154* 101* 113*  --   --  121*   PROTHROMBTM 14.8*  --   --   --   --   --    APTT 28.9  --   --   --   --   --    INR 1.17*  --   --   --   --   --        Recent Results (from the past 24 hour(s))   CEA    Collection Time: 08/18/23  8:32 AM   Result Value Ref Range    Carcinoembryonic Antigen 1.9 0.0 - 3.0 ng/mL   CA 19-9    Collection Time: 08/18/23  8:32 AM   Result Value Ref Range    Ca 19-9 17.4 0.0 - 35.0 U/mL   HEMOGLOBIN AND HEMATOCRIT    Collection Time: 08/18/23 11:12 AM   Result Value Ref Range    Hemoglobin 7.4 (L) 14.0 - 18.0 g/dL    Hematocrit 22.8 (L) 42.0 - 52.0 %   HEMOGLOBIN AND HEMATOCRIT    Collection Time: 08/18/23  6:08 PM   Result Value Ref Range    Hemoglobin 7.6 (L) 14.0 - 18.0 g/dL    Hematocrit 22.6 (L) 42.0 - 52.0 %   CBC WITHOUT DIFFERENTIAL    Collection Time: 08/19/23 12:57 AM   Result Value Ref Range    WBC 6.9 4.8 - 10.8 K/uL    RBC 2.14 (L) 4.70 - 6.10 M/uL    Hemoglobin 6.5 (L) 14.0 - 18.0 g/dL    Hematocrit 20.1 (L) 42.0 - 52.0 %    MCV 93.9 81.4 - 97.8 fL    MCH 30.4 27.0 - 33.0 pg    MCHC 32.3 32.3 - 36.5 g/dL    RDW 46.9 35.9 - 50.0 fL    Platelet Count 121 (L) 164 - 446 K/uL    MPV 13.5 (H) 9.0 - 12.9 fL   IMMATURE PLT FRACTION    Collection Time: 08/19/23 12:57 AM   Result Value Ref Range    Imm. Plt Fraction 21.3 (H) 0.6 - 13.1 %       Radiology Review:  MR-ABDOMEN-WITH & W/O   Final Result         1. The 4.6 cm mass in the right hepatic lobe with characteristics consistent with HCC.   2. Cirrhotic appearing liver.   3. Cholelithiasis.         CTA ABDOMEN PELVIS W & W/O POST PROCESS   Final Result      1.  No evidence of active arterial gastrointestinal hemorrhage.   2.  Likely enteritis.   3.  Cirrhosis with 4.6 cm hepatic mass, likely HCC. Recommend nonemergent MRI hepatic protocol.   4.  Atherosclerotic changes. No aneurysm or dissection.   5.   Punctate, nonobstructing left renal stone.   6.  Cholelithiasis.      Study unavailable for interpretation until 8/16/2023 5:59 PM due to unknown issue with hospital PACS system.      DX-CHEST-PORTABLE (1 VIEW)   Final Result      No acute cardiac or pulmonary abnormalities are identified.            MDM (Data Review):   -Records reviewed and summarized in current documentation  -I personally reviewed and interpreted the laboratory results  -I personally reviewed the radiology images    Assessment/Recommendations:  63-year-old male with no significant GI history presented via the emergency room with newly diagnosed cirrhosis, liver lesions, GI bleeding.  He underwent EGD which revealed esophageal varices status post 7 banding, portal hypertensive gastropathy.  Hep C antibody reactive, PCR pending.  MRI liver protocol reveals 4.6 cm mass in the right hepatic lobe consistent with HCC and cirrhosis.  Also revealed cholelithiasis.    Recommendations:  -Hemoglobin drifting down, will continue close monitoring for bleeding   -Trend H&H, transfuse for hemoglobin less than 7  -PPI twice daily  -Complete 72 hours of octreotide, scheduled to end tonight at 2230  -Complete ceftriaxone for cirrhotic bleeding  -Advance to GI soft diet  -CA 19-9, AFP, and CEA within normal limits (not all HCC tumors are AFP positive or secrete AFP)  -Hep C PCR pending  -Needs hepatobiliary surgery consult for consideration of resection of HCC    GI team will continue to follow    Plan of care discussed with patient, RN, Dr. Pearl, and Dr. Og    Core Quality Measures   Reviewed items::  Labs, Medications and Radiology reports reviewed

## 2023-08-19 NOTE — CARE PLAN
"The patient is Stable - Low risk of patient condition declining or worsening    Shift Goals  Clinical Goals: Monitor labs, vitals, pain  Patient Goals: \"go home\"  Family Goals: GIOVANNI    Progress made toward(s) clinical / shift goals:      Problem: Knowledge Deficit - Standard  Goal: Patient and family/care givers will demonstrate understanding of plan of care, disease process/condition, diagnostic tests and medications  Outcome: Progressing  Note: Patient verbally demonstrates understanding of POC and disease process. All patient questions answered.       Patient is not progressing towards the following goals:      Problem: Risk for Bleeding  Goal: Patient will not experience bleeding as evidenced by normal blood pressure, stable hematocrit and hemoglobin levels and desired ranges for coagulation profiles  Outcome: Not Progressing  Note: Patients H&H did drop overnight with patient requiring 1 unit of PRBC. Patient BP was low overnight with an elevated HR. Patient has had no signs of blood in his stool.     "

## 2023-08-19 NOTE — CARE PLAN
The patient is Watcher - Medium risk of patient condition declining or worsening    Shift Goals  Clinical Goals: monitor H/H, monitor labs, promote sitting in chair for meals, promote sitting up, monitor oxygen demand when sleeping  Patient Goals: sleep, feel better  Family Goals: GIOVANNI    Progress made toward(s) clinical / shift goals:      Problem: Knowledge Deficit - Standard  Goal: Patient and family/care givers will demonstrate understanding of plan of care, disease process/condition, diagnostic tests and medications  Outcome: Progressing     Problem: Risk for Bleeding  Goal: Patient will take measures to prevent bleeding and recognizes signs of bleeding that need to be reported immediately to a health care professional  Outcome: Progressing          Patient is not progressing towards the following goals:      Problem: Risk for Bleeding  Goal: Patient will not experience bleeding as evidenced by normal blood pressure, stable hematocrit and hemoglobin levels and desired ranges for coagulation profiles  Outcome: Not Progressing

## 2023-08-20 ENCOUNTER — APPOINTMENT (OUTPATIENT)
Dept: RADIOLOGY | Facility: MEDICAL CENTER | Age: 63
DRG: 432 | End: 2023-08-20
Attending: SURGERY
Payer: MEDICARE

## 2023-08-20 DIAGNOSIS — C22.0 HEPATOCELLULAR CARCINOMA (HCC): ICD-10-CM

## 2023-08-20 DIAGNOSIS — R16.0 LIVER MASS: ICD-10-CM

## 2023-08-20 LAB
ABO GROUP BLD: NORMAL
BARCODED ABORH UBTYP: 6200
BARCODED ABORH UBTYP: 6200
BARCODED PRD CODE UBPRD: NORMAL
BARCODED PRD CODE UBPRD: NORMAL
BARCODED UNIT NUM UBUNT: NORMAL
BARCODED UNIT NUM UBUNT: NORMAL
BLD GP AB SCN SERPL QL: NORMAL
COMPONENT R 8504R: NORMAL
COMPONENT R 8504R: NORMAL
ERYTHROCYTE [DISTWIDTH] IN BLOOD BY AUTOMATED COUNT: 47.9 FL (ref 35.9–50)
FERRITIN SERPL-MCNC: 233 NG/ML (ref 22–322)
HCT VFR BLD AUTO: 20.4 % (ref 42–52)
HCT VFR BLD AUTO: 21.8 % (ref 42–52)
HCT VFR BLD AUTO: 22.4 % (ref 42–52)
HCT VFR BLD AUTO: 22.4 % (ref 42–52)
HCV RNA SERPL NAA+PROBE-ACNC: ABNORMAL IU/ML
HCV RNA SERPL NAA+PROBE-LOG IU: 6.42 LOG IU/ML
HCV RNA SERPL QL NAA+PROBE: DETECTED
HGB BLD-MCNC: 6.6 G/DL (ref 14–18)
HGB BLD-MCNC: 7.2 G/DL (ref 14–18)
HGB BLD-MCNC: 7.5 G/DL (ref 14–18)
HGB BLD-MCNC: 7.6 G/DL (ref 14–18)
IRON SATN MFR SERPL: 19 % (ref 15–55)
IRON SERPL-MCNC: 45 UG/DL (ref 50–180)
MCH RBC QN AUTO: 30.4 PG (ref 27–33)
MCHC RBC AUTO-ENTMCNC: 33.5 G/DL (ref 32.3–36.5)
MCV RBC AUTO: 90.7 FL (ref 81.4–97.8)
PLATELET # BLD AUTO: 95 K/UL (ref 164–446)
PLATELETS.RETICULATED NFR BLD AUTO: 14.6 % (ref 0.6–13.1)
PMV BLD AUTO: 13.3 FL (ref 9–12.9)
PRODUCT TYPE UPROD: NORMAL
PRODUCT TYPE UPROD: NORMAL
RBC # BLD AUTO: 2.47 M/UL (ref 4.7–6.1)
RH BLD: NORMAL
TIBC SERPL-MCNC: 231 UG/DL (ref 250–450)
UIBC SERPL-MCNC: 186 UG/DL (ref 110–370)
UNIT STATUS USTAT: NORMAL
UNIT STATUS USTAT: NORMAL
WBC # BLD AUTO: 7.1 K/UL (ref 4.8–10.8)

## 2023-08-20 PROCEDURE — C9113 INJ PANTOPRAZOLE SODIUM, VIA: HCPCS

## 2023-08-20 PROCEDURE — 99232 SBSQ HOSP IP/OBS MODERATE 35: CPT | Performed by: STUDENT IN AN ORGANIZED HEALTH CARE EDUCATION/TRAINING PROGRAM

## 2023-08-20 PROCEDURE — P9016 RBC LEUKOCYTES REDUCED: HCPCS

## 2023-08-20 PROCEDURE — 83550 IRON BINDING TEST: CPT

## 2023-08-20 PROCEDURE — 99233 SBSQ HOSP IP/OBS HIGH 50: CPT | Performed by: NURSE PRACTITIONER

## 2023-08-20 PROCEDURE — 700111 HCHG RX REV CODE 636 W/ 250 OVERRIDE (IP): Mod: JA | Performed by: STUDENT IN AN ORGANIZED HEALTH CARE EDUCATION/TRAINING PROGRAM

## 2023-08-20 PROCEDURE — 36430 TRANSFUSION BLD/BLD COMPNT: CPT

## 2023-08-20 PROCEDURE — 74160 CT ABDOMEN W/CONTRAST: CPT

## 2023-08-20 PROCEDURE — 86900 BLOOD TYPING SEROLOGIC ABO: CPT

## 2023-08-20 PROCEDURE — 82728 ASSAY OF FERRITIN: CPT

## 2023-08-20 PROCEDURE — 700117 HCHG RX CONTRAST REV CODE 255: Performed by: SURGERY

## 2023-08-20 PROCEDURE — 85055 RETICULATED PLATELET ASSAY: CPT

## 2023-08-20 PROCEDURE — 85027 COMPLETE CBC AUTOMATED: CPT

## 2023-08-20 PROCEDURE — 86923 COMPATIBILITY TEST ELECTRIC: CPT

## 2023-08-20 PROCEDURE — 86850 RBC ANTIBODY SCREEN: CPT

## 2023-08-20 PROCEDURE — 36415 COLL VENOUS BLD VENIPUNCTURE: CPT

## 2023-08-20 PROCEDURE — 85014 HEMATOCRIT: CPT

## 2023-08-20 PROCEDURE — 700111 HCHG RX REV CODE 636 W/ 250 OVERRIDE (IP)

## 2023-08-20 PROCEDURE — 86901 BLOOD TYPING SEROLOGIC RH(D): CPT

## 2023-08-20 PROCEDURE — 700105 HCHG RX REV CODE 258: Performed by: STUDENT IN AN ORGANIZED HEALTH CARE EDUCATION/TRAINING PROGRAM

## 2023-08-20 PROCEDURE — 83540 ASSAY OF IRON: CPT

## 2023-08-20 PROCEDURE — 770020 HCHG ROOM/CARE - TELE (206)

## 2023-08-20 PROCEDURE — 85018 HEMOGLOBIN: CPT

## 2023-08-20 RX ORDER — SODIUM CHLORIDE 9 MG/ML
INJECTION, SOLUTION INTRAVENOUS CONTINUOUS
Status: ACTIVE | OUTPATIENT
Start: 2023-08-20 | End: 2023-08-20

## 2023-08-20 RX ADMIN — PANTOPRAZOLE SODIUM 40 MG: 40 INJECTION, POWDER, FOR SOLUTION INTRAVENOUS at 04:39

## 2023-08-20 RX ADMIN — CEFTRIAXONE SODIUM 2000 MG: 10 INJECTION, POWDER, FOR SOLUTION INTRAVENOUS at 18:10

## 2023-08-20 RX ADMIN — OCTREOTIDE ACETATE 50 MCG/HR: 200 INJECTION, SOLUTION INTRAVENOUS; SUBCUTANEOUS at 10:32

## 2023-08-20 RX ADMIN — IOHEXOL 100 ML: 350 INJECTION, SOLUTION INTRAVENOUS at 11:11

## 2023-08-20 RX ADMIN — PANTOPRAZOLE SODIUM 40 MG: 40 INJECTION, POWDER, FOR SOLUTION INTRAVENOUS at 18:10

## 2023-08-20 ASSESSMENT — ENCOUNTER SYMPTOMS
FOCAL WEAKNESS: 0
BLURRED VISION: 0
VOMITING: 0
BACK PAIN: 0
HEADACHES: 0
SENSORY CHANGE: 0
FEVER: 0
COUGH: 0
HEARTBURN: 0
FALLS: 0
DIARRHEA: 0
EYE PAIN: 0
CHILLS: 0
DIZZINESS: 0
CONSTIPATION: 0
PALPITATIONS: 0
INSOMNIA: 0
NAUSEA: 0
DEPRESSION: 0
ABDOMINAL PAIN: 0
BLOOD IN STOOL: 1
BLOOD IN STOOL: 0
SHORTNESS OF BREATH: 0
WEAKNESS: 1
WEAKNESS: 0

## 2023-08-20 ASSESSMENT — PAIN DESCRIPTION - PAIN TYPE
TYPE: ACUTE PAIN

## 2023-08-20 ASSESSMENT — LIFESTYLE VARIABLES: SUBSTANCE_ABUSE: 0

## 2023-08-20 ASSESSMENT — FIBROSIS 4 INDEX
FIB4 SCORE: 4.78
FIB4 SCORE: 4.5

## 2023-08-20 NOTE — PROGRESS NOTES
Report received from outgoing nurse, care transferred at 1900. Patient currently in SR 74 on the monitor and A&Ox 4. Patient denies any pain, SOB, lightheadedness, dizziness, or numbness/tingling at this time. Currently sitting up in chair talking with friends. Patient reports feeling much better than yesterday. Lung sounds clear, pulses weak and thready. Whiteboard updated with plan for the day and names of staff. No other questions or concerns at this time from the patient. Call light within reach, fall precautions in place.

## 2023-08-20 NOTE — PROGRESS NOTES
Monitor Summary:  Rhythm: SR  Rate: 69-78  Ectopy: (R) PAC, (R) [place]  Measurement: .14/.08/.44

## 2023-08-20 NOTE — PROGRESS NOTES
[facility] Medicine Daily Progress Note    Date of Service  8/20/2023    Chief Complaint  Tyrone Giraldo is a 63 y.o. male admitted 8/16/2023 with melena.    Hospital Course  Tyrone is a 63 y.o. male who presented 8/16/2023 with concerns of weakness and dark stools.  Past medical history significant for coronary artery disease and MI x2 in 2006 and 2009, chronic sciatica pain.     Patient states that he has been feeling weak and dizzy for the last couple of days.  Patient presented to the ED yesterday on 8/15/2023 for similar complaints.  At the time he denied any melena or hematochezia.  Patient was hemodynamically stable.  After appropriate work-up, he was discharged home.  This morning, patient had a bowel movement with melanotic dark stools was dark red tinge.  He continued to feel weak and dizzy.  Therefore, he presented back to the ED.  Patient felt nauseous and sick to his stomach with a queasy feeling.  Patient denies any similar episodes of black, tarry stools in the past.  This is the first occurrence.  Denies any use of blood thinners.  Of note, patient is on aspirin 325 mg.  Denies any NSAID use, no alcohol use.  GI was contacted by the ED physician: Recommend n.p.o. after midnight, antibiotic for SBP prophylaxis, IV PPI, and octreotide drip.  Patient will be scheduled for endoscopy on 8/17/2023.     In the ED, patient appears to be hemodynamically stable.  Fecal occult blood test positive.  No active bleed.  TRINO resulted in black stools with dark red tinge on the glove.  CBC concerning for normocytic anemia.  CMP shows a slight elevation in AST.  Cardiac markers negative.  TEG scan unremarkable.  CT abdomen pelvis with contrast shows cirrhosis of the liver with a 4.6 cm hepatic mass, requiring further MRI imaging.    Interval Problem Update  Patient with Hgb 6.6 this morning, received another 1 unit RBC transfusion.  No active bleeding noted, no BM.  Continue H/H checks q6h per GI.  Continue  octreotide drip, IV PPI BID, ceftriaxone.  Anticipate discharge to home when bleeding has stabilized and no longer requiring transfusions.      I have discussed this patient's plan of care and discharge plan at IDT rounds today with Case Management, Nursing, Nursing leadership, and other members of the IDT team.    Consultants/Specialty  GI    Code Status  Full Code    Disposition  The patient is not medically cleared for discharge to home or a post-acute facility.  Anticipate discharge to: home with close outpatient follow-up    I have placed the appropriate orders for post-discharge needs.    Review of Systems  Review of Systems   Constitutional:  Negative for chills and fever.   Eyes:  Negative for blurred vision and pain.   Respiratory:  Negative for cough and shortness of breath.    Cardiovascular:  Negative for chest pain, palpitations and leg swelling.   Gastrointestinal:  Positive for blood in stool and melena. Negative for abdominal pain, constipation, diarrhea, nausea and vomiting.   Genitourinary:  Negative for dysuria and urgency.   Musculoskeletal:  Negative for back pain and falls.   Skin:  Negative for itching and rash.   Neurological:  Positive for weakness. Negative for dizziness, sensory change, focal weakness and headaches.   Psychiatric/Behavioral:  Negative for substance abuse. The patient does not have insomnia.         Physical Exam  Temp:  [36.1 °C (97 °F)-37.3 °C (99.1 °F)] 36.4 °C (97.6 °F)  Pulse:  [68-82] 72  Resp:  [14-20] 18  BP: ()/(51-75) 105/69  SpO2:  [90 %-99 %] 97 %    Physical Exam  Constitutional:       General: He is not in acute distress.     Appearance: He is not ill-appearing.   HENT:      Head: Normocephalic and atraumatic.      Right Ear: External ear normal.      Left Ear: External ear normal.      Mouth/Throat:      Pharynx: No oropharyngeal exudate or posterior oropharyngeal erythema.   Eyes:      Extraocular Movements: Extraocular movements intact.      Pupils:  Pupils are equal, round, and reactive to light.   Cardiovascular:      Rate and Rhythm: Normal rate and regular rhythm.      Pulses: Normal pulses.      Heart sounds: Normal heart sounds.   Pulmonary:      Effort: Pulmonary effort is normal. No respiratory distress.      Breath sounds: Normal breath sounds. No wheezing or rales.   Abdominal:      General: Bowel sounds are normal. There is no distension.      Palpations: Abdomen is soft.      Tenderness: There is no abdominal tenderness. There is no guarding.   Musculoskeletal:         General: No swelling or tenderness.      Cervical back: Normal range of motion and neck supple.      Right lower leg: No edema.      Left lower leg: No edema.   Skin:     General: Skin is warm and dry.   Neurological:      General: No focal deficit present.      Mental Status: He is oriented to person, place, and time.      Cranial Nerves: No cranial nerve deficit.      Sensory: No sensory deficit.      Motor: No weakness.   Psychiatric:         Mood and Affect: Mood normal.         Behavior: Behavior normal.         Fluids    Intake/Output Summary (Last 24 hours) at 8/20/2023 1438  Last data filed at 8/20/2023 0739  Gross per 24 hour   Intake 930 ml   Output --   Net 930 ml       Laboratory  Recent Labs     08/19/23  0057 08/19/23  0804 08/19/23  1552 08/19/23  2218 08/20/23  0403 08/20/23  0958   WBC 6.9 7.7  --   --   --  7.1   RBC 2.14* 2.52*  --   --   --  2.47*   HEMOGLOBIN 6.5* 7.7*   < > 7.7* 6.6* 7.5*   HEMATOCRIT 20.1* 22.7*   < > 23.8* 20.4* 22.4*   MCV 93.9 90.1  --   --   --  90.7   MCH 30.4 30.6  --   --   --  30.4   MCHC 32.3 33.9  --   --   --  33.5   RDW 46.9 45.3  --   --   --  47.9   PLATELETCT 121* 101*  --   --   --  95*   MPV 13.5* 11.9  --   --   --  13.3*    < > = values in this interval not displayed.     Recent Labs     08/18/23  0037   SODIUM 137   POTASSIUM 4.2   CHLORIDE 106   CO2 22   GLUCOSE 134*   BUN 22   CREATININE 0.87   CALCIUM 7.4*                        Imaging  CT-ABDOMEN LIVER FOR HEPATIC MASS (CIRRHOSIS)   Final Result      Hepatic cirrhosis with hyperenhancing mass with washout the RIGHT lobe liver, segment 6, measuring 5 cm, surrounding hyperemia and pseudocapsule, most consistent with hepatocellular carcinoma.   Multiple small cystic lesions primarily in the inferior RIGHT lobe liver.   Enlarged kashmir hepatis, portacaval, and celiac lymph nodes, likely metastatic.   Cholelithiasis.   Gastric varices at the fundus with probable splenorenal shunt.      LI-RADS: LR-4: probably HCC      MR-ABDOMEN-WITH & W/O   Final Result         1. The 4.6 cm mass in the right hepatic lobe with characteristics consistent with HCC.   2. Cirrhotic appearing liver.   3. Cholelithiasis.         CTA ABDOMEN PELVIS W & W/O POST PROCESS   Final Result      1.  No evidence of active arterial gastrointestinal hemorrhage.   2.  Likely enteritis.   3.  Cirrhosis with 4.6 cm hepatic mass, likely HCC. Recommend nonemergent MRI hepatic protocol.   4.  Atherosclerotic changes. No aneurysm or dissection.   5.  Punctate, nonobstructing left renal stone.   6.  Cholelithiasis.      Study unavailable for interpretation until 8/16/2023 5:59 PM due to unknown issue with hospital PACS system.      DX-CHEST-PORTABLE (1 VIEW)   Final Result      No acute cardiac or pulmonary abnormalities are identified.           Assessment/Plan  * GI bleed- (present on admission)  Assessment & Plan  Patient presentation after acute episode of dark red/black muddy stool at home.  TRINO showed dark stool with retention on glove residue.  FOBT positive.  Patient hemodynamically stable, no indication for emergent endoscopy.  GI consulted.  CT imaging shows 4.6 cm hepatic mass with no acute bleed.  Patient has never had an endoscopy  S/p EGD showing esophageal varices, portal hypertensive gastropathy with oozing and blood remannts; s/p banding of varices  Continue octreotide drip  Continue IV PPI BID,  ceftriaxone  Soft GI diet  GI following, appreciate recommendations  Still requiring rbc transfusions, monitor H/H q8h, transfuse for Hgb<7    Liver mass- (present on admission)  Assessment & Plan  MRI shows 4.6 cm mass likely HCC  Hepatobiliary surgery consulted, follow up as outpatient    History of MI (myocardial infarction)  Assessment & Plan  History of 2 myocardial infarctions in 2006 and thousand 9, s/p stent placement.  Patient takes aspirin 325 mg at home.  No acute episode of chest pain, or troponins.  -Discontinue aspirin in the setting of active GI bleed.  Reassess at the time of discharge, may decrease dosing to 81 mg  -Hemoglobin A1c  -Lipid panel    Weakness  Assessment & Plan  Weakness for the past 2-3 days.  Most likely in the setting of anemia with active GI bleed.  -Physical therapy and Occupational Therapy referrals  -Continue to monitor    Acute blood loss anemia- (present on admission)  Assessment & Plan  Normocytic anemia as per ED labs.  Most likely in the setting of GI bleed.   Monitor H/H q8h, transfuse for Hgb<7  No signs of active bleeding  Requiring 1 unit RBC transfusion 8/19, 8/20         VTE prophylaxis:   SCDs/TEDs

## 2023-08-20 NOTE — CARE PLAN
The patient is Watcher - Medium risk of patient condition declining or worsening    Shift Goals  Clinical Goals: Monitor hemodynamics, VS  Patient Goals: get better, sleep  Family Goals: na    Progress made toward(s) clinical / shift goals:    Problem: Risk for Fluid Volume Deficit Related to Bleeding  Goal: Fluid volume balance will be maintained  Outcome: Progressing     Problem: Risk for Fluid Volume Deficit Related to Bleeding  Goal: Patient will show no signs and symptoms of excessive bleeding  Outcome: Progressing     Problem: Risk for Bleeding  Goal: Patient will take measures to prevent bleeding and recognizes signs of bleeding that need to be reported immediately to a health care professional  Outcome: Progressing     Problem: Risk for Bleeding  Goal: Patient will not experience bleeding as evidenced by normal blood pressure, stable hematocrit and hemoglobin levels and desired ranges for coagulation profiles  Outcome: Progressing       Patient is not progressing towards the following goals:

## 2023-08-20 NOTE — PROGRESS NOTES
..Gastroenterology Progress Note               Author:  GISELA Jordan Date & Time Created: 8/20/2023 7:55 AM       Patient ID:  Name:             Tyrone Giraldo  YOB: 1960  Age:                 63 y.o.  male  MRN:               [id number]        Medical Decision Making, by Problem:  Active Hospital Problems    Diagnosis     Liver mass [R16.0]     GI bleed [K92.2]     Acute blood loss anemia [D62]     Weakness [R53.1]     History of MI (myocardial infarction) [I25.2]            Presenting Chief Complaint:  Upper GI bleeding      Interval History:  8/20/2023: Patient seen and examined. Patient denies BM. No abdominal pain, orthostatic positive. Hgb dropped to 6.6 again overnight, received 1 unit PRBC with repeat CBC pending, Iron studies ordered.     8/19/2023: Patient seen this morning, I woke him from sleep, he does not have any complaints.  Denies having a bowel movement ever 1 is charted overnight. Hgb down to 6.5 overnight from 7.6 yesterday requiring 1 unit PRBC.  Platelets 121.  Recheck hemoglobin 7.7    8/18/2023: Patient seen and examined.  Reports feeling hungry.  No bowel movement but is having dark black smears per nursing. VSS. Hgb 7.9<9.6. Tumor markers not ordered, placed.  MRI consistent with HCC, Hep C PCR pending    8/17/2023: EGD with Dr. Zendejas:  Esophageal varices, 3 columns, mod size, no active bleeding now, s/p 7 banding due to size.   Portal hypertensive gastropathy with scattered oozing in body, no treatment provided. Could be bleeding more before.   No overt gastric varices.   Some remaining blood in fundus.   Duodenum grossly normal.       Hospital Medications:  Current Facility-Administered Medications   Medication Dose Frequency Provider Last Rate Last Admin    NS infusion   Continuous Mariel Owens A.P.R.N. 30 mL/hr at 08/20/23 0515 Rate Verify at 08/20/23 0515    labetalol (Normodyne/Trandate) injection 10 mg  10 mg Q4HRS PRN Pop Alves M.D.         cefTRIAXone (Rocephin) syringe 2,000 mg  2,000 mg Q24HRS Pop Alves M.D.   2,000 mg at 08/19/23 1615    pantoprazole (Protonix) injection 40 mg  40 mg BID Pop Alves M.D.   40 mg at 08/20/23 0439   Last reviewed on 8/16/2023  7:17 PM by Mary Bryan, T       Review of Systems:  Review of Systems   Constitutional:  Positive for malaise/fatigue. Negative for chills and fever.   HENT:  Negative for hearing loss.    Eyes:  Negative for blurred vision.   Respiratory:  Negative for cough and shortness of breath.    Cardiovascular:  Negative for chest pain and leg swelling.   Gastrointestinal:  Negative for blood in stool, constipation, diarrhea, heartburn, melena, nausea and vomiting.   Genitourinary:  Negative for dysuria.   Musculoskeletal:  Negative for back pain.   Skin:  Negative for rash.   Neurological:  Negative for dizziness and weakness.   Psychiatric/Behavioral:  Negative for depression.          Vital signs:  Weight/BMI: Body mass index is 22.57 kg/m².  /75   Pulse 68   Temp 36.6 °C (97.9 °F) (Temporal)   Resp 16   Ht 1.829 m (6')   Wt 75.5 kg (166 lb 7.2 oz)   SpO2 99%   Vitals:    08/20/23 0500 08/20/23 0610 08/20/23 0625 08/20/23 0739   BP:  115/67 107/58 135/75   Pulse:  77 74 68   Resp:  19 20 16   Temp:  36.9 °C (98.4 °F) 36.9 °C (98.4 °F) 36.6 °C (97.9 °F)   TempSrc:   Temporal Temporal   SpO2:  90% 93% 99%   Weight: 75.5 kg (166 lb 7.2 oz)      Height:         Oxygen Therapy:  Pulse Oximetry: 99 %, O2 (LPM): 0.5, O2 Delivery Device: Silicone Nasal Cannula    Intake/Output Summary (Last 24 hours) at 8/20/2023 0755  Last data filed at 8/19/2023 2030  Gross per 24 hour   Intake 840 ml   Output --   Net 840 ml           Physical Exam:  Physical Exam  Vitals and nursing note reviewed.   Constitutional:       General: He is not in acute distress.     Appearance: Normal appearance. He is not ill-appearing.      Comments: Thin   HENT:      Head: Normocephalic and atraumatic.      Right  Ear: External ear normal.      Left Ear: External ear normal.      Nose: Nose normal.      Mouth/Throat:      Mouth: Mucous membranes are moist.      Pharynx: Oropharynx is clear.   Eyes:      General: No scleral icterus.  Cardiovascular:      Rate and Rhythm: Normal rate and regular rhythm.      Pulses: Normal pulses.      Heart sounds: Normal heart sounds.   Pulmonary:      Effort: Pulmonary effort is normal.      Breath sounds: Normal breath sounds.   Abdominal:      General: Abdomen is flat. Bowel sounds are normal. There is no distension.      Palpations: Abdomen is soft.   Musculoskeletal:         General: Normal range of motion.      Cervical back: Normal range of motion and neck supple.   Skin:     General: Skin is warm.      Capillary Refill: Capillary refill takes less than 2 seconds.      Coloration: Skin is pale.   Neurological:      Mental Status: He is alert and oriented to person, place, and time.   Psychiatric:         Mood and Affect: Mood normal.         Behavior: Behavior normal.             Labs:  Recent Labs     08/18/23  0037   SODIUM 137   POTASSIUM 4.2   CHLORIDE 106   CO2 22   BUN 22   CREATININE 0.87   CALCIUM 7.4*       Recent Labs     08/18/23  0037   GLUCOSE 134*       Recent Labs     08/18/23  0037 08/19/23  0057 08/19/23  0804   WBC 5.7 6.9 7.7       Recent Labs     08/18/23  0037 08/18/23  1112 08/19/23  0057 08/19/23  0804 08/19/23  1552 08/19/23  2218 08/20/23  0403   RBC 2.63*  --  2.14* 2.52*  --   --   --    HEMOGLOBIN 7.9*   < > 6.5* 7.7* 7.2* 7.7* 6.6*   HEMATOCRIT 24.1*   < > 20.1* 22.7* 22.0* 23.8* 20.4*   PLATELETCT 113*  --  121* 101*  --   --   --     < > = values in this interval not displayed.       Recent Results (from the past 24 hour(s))   CBC Without Differential    Collection Time: 08/19/23  8:04 AM   Result Value Ref Range    WBC 7.7 4.8 - 10.8 K/uL    RBC 2.52 (L) 4.70 - 6.10 M/uL    Hemoglobin 7.7 (L) 14.0 - 18.0 g/dL    Hematocrit 22.7 (L) 42.0 - 52.0 %    MCV  90.1 81.4 - 97.8 fL    MCH 30.6 27.0 - 33.0 pg    MCHC 33.9 32.3 - 36.5 g/dL    RDW 45.3 35.9 - 50.0 fL    Platelet Count 101 (L) 164 - 446 K/uL    MPV 11.9 9.0 - 12.9 fL   IMMATURE PLT FRACTION    Collection Time: 08/19/23  8:04 AM   Result Value Ref Range    Imm. Plt Fraction 11.7 0.6 - 13.1 %   HEMOGLOBIN AND HEMATOCRIT    Collection Time: 08/19/23  3:52 PM   Result Value Ref Range    Hemoglobin 7.2 (L) 14.0 - 18.0 g/dL    Hematocrit 22.0 (L) 42.0 - 52.0 %   HEMOGLOBIN AND HEMATOCRIT    Collection Time: 08/19/23 10:18 PM   Result Value Ref Range    Hemoglobin 7.7 (L) 14.0 - 18.0 g/dL    Hematocrit 23.8 (L) 42.0 - 52.0 %   HEMOGLOBIN AND HEMATOCRIT    Collection Time: 08/20/23  4:03 AM   Result Value Ref Range    Hemoglobin 6.6 (L) 14.0 - 18.0 g/dL    Hematocrit 20.4 (L) 42.0 - 52.0 %   COD (Adult) - Type and Crossmatch only order if transfusing RBC'S    Collection Time: 08/20/23  4:03 AM   Result Value Ref Range    ABO Grouping Only A     Rh Grouping Only POS     Antibody Screen-Cod NEG     Component R       R66                 Red Blood Cells6    [id number]   issued       08/20/23   06:02      Product Type Red Blood Cells  LR Pheresis     Dispense Status issued     Unit Number (Barcoded) [id number]     Product Code (Barcoded) O5904O10     Blood Type (Barcoded) 6200        Radiology Review:  MR-ABDOMEN-WITH & W/O   Final Result         1. The 4.6 cm mass in the right hepatic lobe with characteristics consistent with HCC.   2. Cirrhotic appearing liver.   3. Cholelithiasis.         CTA ABDOMEN PELVIS W & W/O POST PROCESS   Final Result      1.  No evidence of active arterial gastrointestinal hemorrhage.   2.  Likely enteritis.   3.  Cirrhosis with 4.6 cm hepatic mass, likely HCC. Recommend nonemergent MRI hepatic protocol.   4.  Atherosclerotic changes. No aneurysm or dissection.   5.  Punctate, nonobstructing left renal stone.   6.  Cholelithiasis.      Study unavailable for interpretation until 8/16/2023  5:59 PM due to unknown issue with hospital PACS system.      DX-CHEST-PORTABLE (1 VIEW)   Final Result      No acute cardiac or pulmonary abnormalities are identified.            MDM (Data Review):   -Records reviewed and summarized in current documentation  -I personally reviewed and interpreted the laboratory results  -I personally reviewed the radiology images    Assessment/Recommendations:  63-year-old male with no significant GI history presented via the emergency room with newly diagnosed cirrhosis, liver lesions, GI bleeding.  He underwent EGD which revealed esophageal varices status post 7 banding, portal hypertensive gastropathy.  Hep C antibody reactive, PCR pending.  MRI liver protocol reveals 4.6 cm mass in the right hepatic lobe consistent with HCC and cirrhosis.  Also revealed cholelithiasis.    Recommendations:  -Hemoglobin drifting down, will continue close monitoring for bleeding   - Still no BM and no hematemesis, no emergent indication to re scope a variceal banding patent without clear evidence of GI bleeding  -Continue to trend H&H, transfuse for hemoglobin less than 7  -PPI twice daily  -Agree with octreotide, reasonable to decrease portal pressures   -Advance to GI soft diet  -CA 19-9, AFP, and CEA within normal limits (not all HCC tumors are AFP positive or secrete AFP)  -Hep C PCR pending  -Dr. Jiang saw patient and will follow with him outpatient  -Recommended CT abdomen/pelvis liver    GI team will continue to follow    Plan of care discussed with patient, RN, Dr. Pearl, Dr. Jiang, and Dr. Zendejas    Core Quality Measures   Reviewed items::  Labs, Medications and Radiology reports reviewed

## 2023-08-20 NOTE — PROGRESS NOTES
[facility] HOSPITALIST CROSS COVER    Notified by RN regarding hemoglobin of 6.6, down from 7.7 yesterday.      Vitals:    08/20/23 0415   BP: 110/61   Pulse: 77   Resp: 16   Temp: 36.1 °C (97 °F)   SpO2: 92%        Plan:  #Acute blood loss anemia  -Transfuse 1 unit PRBC  -Monitor vital signs per nursing policy  -Monitor for transfusion reactions to include shortness of breath, itching, hypotension, wheezing, fevers, or chills  -Repeat CBC posttransfusion  -COD ordered and pending    -----------------------------------------------------------------------------------------------------------    Electronically signed by:  Yaneli Owens, DNP, APRN, [id number]  Hospitalist Services

## 2023-08-21 ENCOUNTER — APPOINTMENT (OUTPATIENT)
Dept: RADIOLOGY | Facility: MEDICAL CENTER | Age: 63
DRG: 432 | End: 2023-08-21
Attending: STUDENT IN AN ORGANIZED HEALTH CARE EDUCATION/TRAINING PROGRAM
Payer: MEDICARE

## 2023-08-21 LAB
ANION GAP SERPL CALC-SCNC: 8 MMOL/L (ref 7–16)
BASOPHILS # BLD AUTO: 0.4 % (ref 0–1.8)
BASOPHILS # BLD: 0.04 K/UL (ref 0–0.12)
BUN SERPL-MCNC: 18 MG/DL (ref 8–22)
CALCIUM SERPL-MCNC: 7.2 MG/DL (ref 8.5–10.5)
CHLORIDE SERPL-SCNC: 102 MMOL/L (ref 96–112)
CO2 SERPL-SCNC: 24 MMOL/L (ref 20–33)
CREAT SERPL-MCNC: 0.83 MG/DL (ref 0.5–1.4)
EOSINOPHIL # BLD AUTO: 0.14 K/UL (ref 0–0.51)
EOSINOPHIL NFR BLD: 1.4 % (ref 0–6.9)
ERYTHROCYTE [DISTWIDTH] IN BLOOD BY AUTOMATED COUNT: 51 FL (ref 35.9–50)
GFR SERPLBLD CREATININE-BSD FMLA CKD-EPI: 97 ML/MIN/1.73 M 2
GLUCOSE SERPL-MCNC: 175 MG/DL (ref 65–99)
HCT VFR BLD AUTO: 20.6 % (ref 42–52)
HCT VFR BLD AUTO: 21.5 % (ref 42–52)
HCT VFR BLD AUTO: 24.7 % (ref 42–52)
HGB BLD-MCNC: 6.9 G/DL (ref 14–18)
HGB BLD-MCNC: 7.1 G/DL (ref 14–18)
HGB BLD-MCNC: 8.2 G/DL (ref 14–18)
IMM GRANULOCYTES # BLD AUTO: 0.11 K/UL (ref 0–0.11)
IMM GRANULOCYTES NFR BLD AUTO: 1.1 % (ref 0–0.9)
LYMPHOCYTES # BLD AUTO: 1.16 K/UL (ref 1–4.8)
LYMPHOCYTES NFR BLD: 11.8 % (ref 22–41)
MAGNESIUM SERPL-MCNC: 2 MG/DL (ref 1.5–2.5)
MCH RBC QN AUTO: 30.2 PG (ref 27–33)
MCHC RBC AUTO-ENTMCNC: 33 G/DL (ref 32.3–36.5)
MCV RBC AUTO: 91.5 FL (ref 81.4–97.8)
MONOCYTES # BLD AUTO: 0.91 K/UL (ref 0–0.85)
MONOCYTES NFR BLD AUTO: 9.3 % (ref 0–13.4)
NEUTROPHILS # BLD AUTO: 7.47 K/UL (ref 1.82–7.42)
NEUTROPHILS NFR BLD: 76 % (ref 44–72)
NRBC # BLD AUTO: 0.04 K/UL
NRBC BLD-RTO: 0.4 /100 WBC (ref 0–0.2)
PLATELET # BLD AUTO: 112 K/UL (ref 164–446)
PMV BLD AUTO: 13.1 FL (ref 9–12.9)
POTASSIUM SERPL-SCNC: 3.9 MMOL/L (ref 3.6–5.5)
RBC # BLD AUTO: 2.35 M/UL (ref 4.7–6.1)
SODIUM SERPL-SCNC: 134 MMOL/L (ref 135–145)
WBC # BLD AUTO: 9.8 K/UL (ref 4.8–10.8)

## 2023-08-21 PROCEDURE — 36430 TRANSFUSION BLD/BLD COMPNT: CPT

## 2023-08-21 PROCEDURE — 99291 CRITICAL CARE FIRST HOUR: CPT | Performed by: STUDENT IN AN ORGANIZED HEALTH CARE EDUCATION/TRAINING PROGRAM

## 2023-08-21 PROCEDURE — 83735 ASSAY OF MAGNESIUM: CPT

## 2023-08-21 PROCEDURE — 85025 COMPLETE CBC W/AUTO DIFF WBC: CPT

## 2023-08-21 PROCEDURE — P9016 RBC LEUKOCYTES REDUCED: HCPCS

## 2023-08-21 PROCEDURE — 770020 HCHG ROOM/CARE - TELE (206)

## 2023-08-21 PROCEDURE — 700111 HCHG RX REV CODE 636 W/ 250 OVERRIDE (IP)

## 2023-08-21 PROCEDURE — A9270 NON-COVERED ITEM OR SERVICE: HCPCS | Performed by: NURSE PRACTITIONER

## 2023-08-21 PROCEDURE — 85018 HEMOGLOBIN: CPT

## 2023-08-21 PROCEDURE — 99232 SBSQ HOSP IP/OBS MODERATE 35: CPT | Performed by: NURSE PRACTITIONER

## 2023-08-21 PROCEDURE — 700105 HCHG RX REV CODE 258: Performed by: STUDENT IN AN ORGANIZED HEALTH CARE EDUCATION/TRAINING PROGRAM

## 2023-08-21 PROCEDURE — 87902 NFCT AGT GNTYP ALYS HEP C: CPT

## 2023-08-21 PROCEDURE — 86923 COMPATIBILITY TEST ELECTRIC: CPT

## 2023-08-21 PROCEDURE — 85014 HEMATOCRIT: CPT | Mod: 91

## 2023-08-21 PROCEDURE — 80048 BASIC METABOLIC PNL TOTAL CA: CPT

## 2023-08-21 PROCEDURE — C9113 INJ PANTOPRAZOLE SODIUM, VIA: HCPCS

## 2023-08-21 PROCEDURE — 36415 COLL VENOUS BLD VENIPUNCTURE: CPT

## 2023-08-21 PROCEDURE — 700111 HCHG RX REV CODE 636 W/ 250 OVERRIDE (IP): Mod: JA | Performed by: STUDENT IN AN ORGANIZED HEALTH CARE EDUCATION/TRAINING PROGRAM

## 2023-08-21 PROCEDURE — 700102 HCHG RX REV CODE 250 W/ 637 OVERRIDE(OP): Performed by: NURSE PRACTITIONER

## 2023-08-21 RX ORDER — POLYETHYLENE GLYCOL 3350 17 G/17G
1 POWDER, FOR SOLUTION ORAL DAILY
Status: DISCONTINUED | OUTPATIENT
Start: 2023-08-21 | End: 2023-08-23 | Stop reason: HOSPADM

## 2023-08-21 RX ADMIN — PANTOPRAZOLE SODIUM 40 MG: 40 INJECTION, POWDER, FOR SOLUTION INTRAVENOUS at 04:16

## 2023-08-21 RX ADMIN — OCTREOTIDE ACETATE 50 MCG/HR: 200 INJECTION, SOLUTION INTRAVENOUS; SUBCUTANEOUS at 11:36

## 2023-08-21 RX ADMIN — POLYETHYLENE GLYCOL 3350 1 PACKET: 17 POWDER, FOR SOLUTION ORAL at 11:35

## 2023-08-21 RX ADMIN — PANTOPRAZOLE SODIUM 40 MG: 40 INJECTION, POWDER, FOR SOLUTION INTRAVENOUS at 17:53

## 2023-08-21 ASSESSMENT — ENCOUNTER SYMPTOMS
BLOOD IN STOOL: 1
NAUSEA: 0
FEVER: 0
BLOOD IN STOOL: 0
FALLS: 0
CONSTIPATION: 0
WEAKNESS: 1
VOMITING: 0
BLURRED VISION: 0
SHORTNESS OF BREATH: 0
HEADACHES: 0
ABDOMINAL PAIN: 0
FOCAL WEAKNESS: 0
DEPRESSION: 0
EYE PAIN: 0
SENSORY CHANGE: 0
INSOMNIA: 0
BACK PAIN: 0
CHILLS: 0
WEAKNESS: 0
DIARRHEA: 0
PALPITATIONS: 0
COUGH: 0
DIZZINESS: 0
HEARTBURN: 0

## 2023-08-21 ASSESSMENT — PAIN DESCRIPTION - PAIN TYPE
TYPE: ACUTE PAIN

## 2023-08-21 ASSESSMENT — LIFESTYLE VARIABLES: SUBSTANCE_ABUSE: 0

## 2023-08-21 NOTE — PROGRESS NOTES
[facility] Medicine Daily Progress Note    Date of Service  8/21/2023    Chief Complaint  Tyrone Giraldo is a 63 y.o. male admitted 8/16/2023 with melena.    Hospital Course  Tyrone is a 63 y.o. male who presented 8/16/2023 with concerns of weakness and dark stools.  Past medical history significant for coronary artery disease and MI x2 in 2006 and 2009, chronic sciatica pain.      In the ED, patient appears to be hemodynamically stable.  Fecal occult blood test positive.  No active bleed.  TRINO resulted in black stools with dark red tinge on the glove.  CBC concerning for normocytic anemia.  CMP shows a slight elevation in AST.  Cardiac markers negative.  TEG scan unremarkable.  CT abdomen pelvis with contrast shows cirrhosis of the liver with a 4.6 cm hepatic mass. Patient started on octreotide, IV PPI, ceftriaxone for suspected upper GI bleed in setting of newly diagnosed cirrhosis. He underwent EGD showing esophageal varices banded x7, portal hypertensive gastropathy with oozing, no active bleeding noted on EGD. Patient continued on medical treatment, including octreotide for 72 hours, he continues to have anemia requiring repeat transfusions about once a day. GI following.  He had MRI and CT scan liver to evaluate liver mass, likely HCC per radiologist report. Hepatobiliary surgery consulted who have spoken with patient and recommend outpatient follow up.    Interval Problem Update  Patient with Hgb 6.9 this morning, 1 unit RBCs ordered.  Patient with no BM yet last few days. He denies any focal complaints.  Continue H/H checks q8h, transfuse for Hgb<7.  GI following, plan for advancing diet and patient having BM.  Continue octreotide drip, IV PPI BID, ceftriaxone.  Anticipate discharge to home when bleeding has stabilized and no longer requiring transfusions.      I have discussed this patient's plan of care and discharge plan at IDT rounds today with Case Management, Nursing, Nursing leadership, and other  members of the IDT team.    Consultants/Specialty  GI    Code Status  Full Code    Disposition  The patient is not medically cleared for discharge to home or a post-acute facility.  Anticipate discharge to: home with close outpatient follow-up    I have placed the appropriate orders for post-discharge needs.    Review of Systems  Review of Systems   Constitutional:  Negative for chills and fever.   Eyes:  Negative for blurred vision and pain.   Respiratory:  Negative for cough and shortness of breath.    Cardiovascular:  Negative for chest pain, palpitations and leg swelling.   Gastrointestinal:  Positive for blood in stool and melena. Negative for abdominal pain, constipation, diarrhea, nausea and vomiting.   Genitourinary:  Negative for dysuria and urgency.   Musculoskeletal:  Negative for back pain and falls.   Skin:  Negative for itching and rash.   Neurological:  Positive for weakness. Negative for dizziness, sensory change, focal weakness and headaches.   Psychiatric/Behavioral:  Negative for substance abuse. The patient does not have insomnia.         Physical Exam  Temp:  [36.3 °C (97.3 °F)-37.1 °C (98.8 °F)] 36.9 °C (98.4 °F)  Pulse:  [69-95] 73  Resp:  [13-18] 18  BP: ()/(49-60) 104/58  SpO2:  [90 %-98 %] 92 %    Physical Exam  Constitutional:       General: He is not in acute distress.     Appearance: He is not ill-appearing.   HENT:      Head: Normocephalic and atraumatic.      Right Ear: External ear normal.      Left Ear: External ear normal.      Mouth/Throat:      Pharynx: No oropharyngeal exudate or posterior oropharyngeal erythema.   Eyes:      Extraocular Movements: Extraocular movements intact.      Pupils: Pupils are equal, round, and reactive to light.   Cardiovascular:      Rate and Rhythm: Normal rate and regular rhythm.      Pulses: Normal pulses.      Heart sounds: Normal heart sounds.   Pulmonary:      Effort: Pulmonary effort is normal. No respiratory distress.      Breath sounds:  Normal breath sounds. No wheezing or rales.   Abdominal:      General: Bowel sounds are normal. There is no distension.      Palpations: Abdomen is soft.      Tenderness: There is no abdominal tenderness. There is no guarding.   Musculoskeletal:         General: No swelling or tenderness.      Cervical back: Normal range of motion and neck supple.      Right lower leg: No edema.      Left lower leg: No edema.   Skin:     General: Skin is warm and dry.   Neurological:      General: No focal deficit present.      Mental Status: He is oriented to person, place, and time.      Cranial Nerves: No cranial nerve deficit.      Sensory: No sensory deficit.      Motor: No weakness.   Psychiatric:         Mood and Affect: Mood normal.         Behavior: Behavior normal.         Fluids    Intake/Output Summary (Last 24 hours) at 8/21/2023 1418  Last data filed at 8/21/2023 1400  Gross per 24 hour   Intake 1458.41 ml   Output --   Net 1458.41 ml         Laboratory  Recent Labs     08/19/23  0804 08/19/23  1552 08/20/23  0958 08/20/23  1640 08/20/23  2247 08/21/23  0112 08/21/23  0904   WBC 7.7  --  7.1  --   --  9.8  --    RBC 2.52*  --  2.47*  --   --  2.35*  --    HEMOGLOBIN 7.7*   < > 7.5*   < > 7.2* 7.1* 6.9*   HEMATOCRIT 22.7*   < > 22.4*   < > 21.8* 21.5* 20.6*   MCV 90.1  --  90.7  --   --  91.5  --    MCH 30.6  --  30.4  --   --  30.2  --    MCHC 33.9  --  33.5  --   --  33.0  --    RDW 45.3  --  47.9  --   --  51.0*  --    PLATELETCT 101*  --  95*  --   --  112*  --    MPV 11.9  --  13.3*  --   --  13.1*  --     < > = values in this interval not displayed.       Recent Labs     08/21/23  0112   SODIUM 134*   POTASSIUM 3.9   CHLORIDE 102   CO2 24   GLUCOSE 175*   BUN 18   CREATININE 0.83   CALCIUM 7.2*                         Imaging  CT-ABDOMEN LIVER FOR HEPATIC MASS (CIRRHOSIS)   Final Result      Hepatic cirrhosis with hyperenhancing mass with washout the RIGHT lobe liver, segment 6, measuring 5 cm, surrounding  hyperemia and pseudocapsule, most consistent with hepatocellular carcinoma.   Multiple small cystic lesions primarily in the inferior RIGHT lobe liver.   Enlarged kashmir hepatis, portacaval, and celiac lymph nodes, likely metastatic.   Cholelithiasis.   Gastric varices at the fundus with probable splenorenal shunt.      LI-RADS: LR-4: probably HCC      MR-ABDOMEN-WITH & W/O   Final Result         1. The 4.6 cm mass in the right hepatic lobe with characteristics consistent with HCC.   2. Cirrhotic appearing liver.   3. Cholelithiasis.         CTA ABDOMEN PELVIS W & W/O POST PROCESS   Final Result      1.  No evidence of active arterial gastrointestinal hemorrhage.   2.  Likely enteritis.   3.  Cirrhosis with 4.6 cm hepatic mass, likely HCC. Recommend nonemergent MRI hepatic protocol.   4.  Atherosclerotic changes. No aneurysm or dissection.   5.  Punctate, nonobstructing left renal stone.   6.  Cholelithiasis.      Study unavailable for interpretation until 8/16/2023 5:59 PM due to unknown issue with hospital PACS system.      DX-CHEST-PORTABLE (1 VIEW)   Final Result      No acute cardiac or pulmonary abnormalities are identified.      IR-US GUIDED PIV    (Results Pending)          Assessment/Plan  * GI bleed- (present on admission)  Assessment & Plan  Patient presentation after acute episode of dark red/black muddy stool at home.  TRINO showed dark stool with retention on glove residue.  FOBT positive.  Patient hemodynamically stable, no indication for emergent endoscopy.  GI consulted.  CT imaging shows 4.6 cm hepatic mass with no acute bleed.  Patient has never had an endoscopy  S/p EGD showing esophageal varices, portal hypertensive gastropathy with oozing and blood remannts; s/p banding of varices  Continue octreotide drip  Continue IV PPI BID, ceftriaxone  Soft GI diet  GI following, appreciate recommendations  Still requiring rbc transfusions, monitor H/H q8h, transfuse for Hgb<7    Liver mass- (present on  admission)  Assessment & Plan  MRI shows 4.6 cm mass likely HCC  Hepatobiliary surgery consulted, follow up as outpatient    History of MI (myocardial infarction)  Assessment & Plan  History of 2 myocardial infarctions in 2006 and thousand 9, s/p stent placement.  Patient takes aspirin 325 mg at home.  No acute episode of chest pain, or troponins.  -Discontinue aspirin in the setting of active GI bleed.  Reassess at the time of discharge, may decrease dosing to 81 mg  -Hemoglobin A1c  -Lipid panel    Weakness  Assessment & Plan  Weakness for the past 2-3 days.  Most likely in the setting of anemia with active GI bleed.  -Physical therapy and Occupational Therapy referrals  -Continue to monitor    Acute blood loss anemia- (present on admission)  Assessment & Plan  Normocytic anemia as per ED labs.  Most likely in the setting of GI bleed.   Monitor H/H q8h, transfuse for Hgb<7  No signs of active bleeding  Requiring 1 unit RBC transfusion 8/19, 8/20         VTE prophylaxis:   SCDs/TEDs      Patient is critically ill.   The patient continues to have: acute blood loss anemia  The vital organ system that is affected is the: gastrointestinal  If untreated there is a high chance of deterioration into: worsening anemia, respiratory/cardiac failure, and eventually death.   The critical care that I am providing today is: managing acute anemia with red blood cell transfusion  The critical that has been undertaken is medically complex.   There has been no overlap in critical care time.   Critical Care Time not including procedures: 36 minutes

## 2023-08-21 NOTE — PROGRESS NOTES
..Gastroenterology Progress Note               Author:  GISELA Jordan Date & Time Created: 8/21/2023 7:50 AM       Patient ID:  Name:             Tyrone Giraldo  YOB: 1960  Age:                 63 y.o.  male  MRN:               [id number]        Medical Decision Making, by Problem:  Active Hospital Problems    Diagnosis     Hepatocellular carcinoma (HCC) [C22.0]     Liver mass [R16.0]     GI bleed [K92.2]     Acute blood loss anemia [D62]     Weakness [R53.1]     History of MI (myocardial infarction) [I25.2]            Presenting Chief Complaint:  Upper GI bleeding      Interval History:  8/21/2023: Patient seen and examined this morning.  Reports feeling okay, still with fatigue reports he almost passed out when using the toilet last night.  Notified by RN later in the day that he had a large black stool. repeat hemoglobin at repeat hemoglobin at 9 AM was 6.9, currently receiving 1 unit PRBC and due to have hemoglobin rechecked posttransfusion.    8/20/2023: Patient seen and examined. Patient denies BM. No abdominal pain, orthostatic positive. Hgb dropped to 6.6 again overnight, received 1 unit PRBC with repeat CBC pending, Iron studies ordered.     8/19/2023: Patient seen this morning, I woke him from sleep, he does not have any complaints.  Denies having a bowel movement ever 1 is charted overnight. Hgb down to 6.5 overnight from 7.6 yesterday requiring 1 unit PRBC.  Platelets 121.  Recheck hemoglobin 7.7    8/18/2023: Patient seen and examined.  Reports feeling hungry.  No bowel movement but is having dark black smears per nursing. VSS. Hgb 7.9<9.6. Tumor markers not ordered, placed.  MRI consistent with HCC, Hep C PCR pending    8/17/2023: EGD with Dr. Zendejas:  Esophageal varices, 3 columns, mod size, no active bleeding now, s/p 7 banding due to size.   Portal hypertensive gastropathy with scattered oozing in body, no treatment provided. Could be bleeding more before.   No overt  gastric varices.   Some remaining blood in fundus.   Duodenum grossly normal.       Hospital Medications:  Current Facility-Administered Medications   Medication Dose Frequency Provider Last Rate Last Admin    octreotide (SandoSTATIN) 1,250 mcg in  mL Infusion  50 mcg/hr Continuous Everardo Pearl M.D. 10 mL/hr at 08/20/23 1032 50 mcg/hr at 08/20/23 1032    labetalol (Normodyne/Trandate) injection 10 mg  10 mg Q4HRS PRN Pop Alves M.D.        pantoprazole (Protonix) injection 40 mg  40 mg BID Pop Alves M.D.   40 mg at 08/21/23 0416   Last reviewed on 8/16/2023  7:17 PM by Mary Bryan [facility]       Review of Systems:  Review of Systems   Constitutional:  Positive for malaise/fatigue. Negative for chills and fever.   HENT:  Negative for hearing loss.    Eyes:  Negative for blurred vision.   Respiratory:  Negative for cough and shortness of breath.    Cardiovascular:  Negative for chest pain and leg swelling.   Gastrointestinal:  Negative for blood in stool, constipation, diarrhea, heartburn, melena, nausea and vomiting.   Genitourinary:  Negative for dysuria.   Musculoskeletal:  Negative for back pain.   Skin:  Negative for rash.   Neurological:  Negative for dizziness and weakness.   Psychiatric/Behavioral:  Negative for depression.          Vital signs:  Weight/BMI: Body mass index is 22.51 kg/m².  BP 98/59   Pulse 74   Temp 36.3 °C (97.4 °F) (Temporal)   Resp 13   Ht 1.829 m (6')   Wt 75.3 kg (166 lb 0.1 oz)   SpO2 90%   Vitals:    08/20/23 2200 08/20/23 2327 08/21/23 0405 08/21/23 0717   BP: 100/51 99/53 91/52 98/59   Pulse: 95 80 74 74   Resp: 16 16 16 13   Temp: 36.6 °C (97.9 °F) 36.8 °C (98.2 °F) 37.1 °C (98.8 °F) 36.3 °C (97.4 °F)   TempSrc: Temporal Temporal Temporal Temporal   SpO2: 95% 94% 93% 90%   Weight:       Height:         Oxygen Therapy:  Pulse Oximetry: 90 %, O2 (LPM): 0, O2 Delivery Device: None - Room Air    Intake/Output Summary (Last 24 hours) at 8/21/2023 8410  Last data  filed at 8/20/2023 2200  Gross per 24 hour   Intake 1391.6 ml   Output --   Net 1391.6 ml           Physical Exam:  Physical Exam  Vitals and nursing note reviewed.   Constitutional:       General: He is not in acute distress.     Appearance: Normal appearance. He is not ill-appearing.      Comments: Thin   HENT:      Head: Normocephalic and atraumatic.      Right Ear: External ear normal.      Left Ear: External ear normal.      Nose: Nose normal.      Mouth/Throat:      Mouth: Mucous membranes are moist.      Pharynx: Oropharynx is clear.   Eyes:      General: No scleral icterus.  Cardiovascular:      Rate and Rhythm: Normal rate and regular rhythm.      Pulses: Normal pulses.      Heart sounds: Normal heart sounds.   Pulmonary:      Effort: Pulmonary effort is normal.      Breath sounds: Normal breath sounds.   Abdominal:      General: Abdomen is flat. Bowel sounds are normal. There is no distension.      Palpations: Abdomen is soft.   Musculoskeletal:         General: Normal range of motion.      Cervical back: Normal range of motion and neck supple.   Skin:     General: Skin is warm.      Capillary Refill: Capillary refill takes less than 2 seconds.      Coloration: Skin is pale.   Neurological:      Mental Status: He is alert and oriented to person, place, and time.   Psychiatric:         Mood and Affect: Mood normal.         Behavior: Behavior normal.             Labs:  Recent Labs     08/21/23  0112   SODIUM 134*   POTASSIUM 3.9   CHLORIDE 102   CO2 24   BUN 18   CREATININE 0.83   MAGNESIUM 2.0   CALCIUM 7.2*       Recent Labs     08/21/23  0112   GLUCOSE 175*       Recent Labs     08/19/23  0804 08/20/23  0958 08/21/23  0112   WBC 7.7 7.1 9.8   NEUTSPOLYS  --   --  76.00*   LYMPHOCYTES  --   --  11.80*   MONOCYTES  --   --  9.30   EOSINOPHILS  --   --  1.40   BASOPHILS  --   --  0.40       Recent Labs     08/19/23  0804 08/19/23  1552 08/20/23  0403 08/20/23  0958 08/20/23  1640 08/20/23  7867  08/21/23  0112   RBC 2.52*  --   --  2.47*  --   --  2.35*   HEMOGLOBIN 7.7*   < > 6.6* 7.5* 7.6* 7.2* 7.1*   HEMATOCRIT 22.7*   < > 20.4* 22.4* 22.4* 21.8* 21.5*   PLATELETCT 101*  --   --  95*  --   --  112*   IRON  --   --  45*  --   --   --   --    FERRITIN  --   --  233.0  --   --   --   --    TOTIRONBC  --   --  231*  --   --   --   --     < > = values in this interval not displayed.       Recent Results (from the past 24 hour(s))   CBC Without Differential    Collection Time: 08/20/23  9:58 AM   Result Value Ref Range    WBC 7.1 4.8 - 10.8 K/uL    RBC 2.47 (L) 4.70 - 6.10 M/uL    Hemoglobin 7.5 (L) 14.0 - 18.0 g/dL    Hematocrit 22.4 (L) 42.0 - 52.0 %    MCV 90.7 81.4 - 97.8 fL    MCH 30.4 27.0 - 33.0 pg    MCHC 33.5 32.3 - 36.5 g/dL    RDW 47.9 35.9 - 50.0 fL    Platelet Count 95 (L) 164 - 446 K/uL    MPV 13.3 (H) 9.0 - 12.9 fL   IMMATURE PLT FRACTION    Collection Time: 08/20/23  9:58 AM   Result Value Ref Range    Imm. Plt Fraction 14.6 (H) 0.6 - 13.1 %   HEMOGLOBIN AND HEMATOCRIT    Collection Time: 08/20/23  4:40 PM   Result Value Ref Range    Hemoglobin 7.6 (L) 14.0 - 18.0 g/dL    Hematocrit 22.4 (L) 42.0 - 52.0 %   HEMOGLOBIN AND HEMATOCRIT    Collection Time: 08/20/23 10:47 PM   Result Value Ref Range    Hemoglobin 7.2 (L) 14.0 - 18.0 g/dL    Hematocrit 21.8 (L) 42.0 - 52.0 %   Basic Metabolic Panel    Collection Time: 08/21/23  1:12 AM   Result Value Ref Range    Sodium 134 (L) 135 - 145 mmol/L    Potassium 3.9 3.6 - 5.5 mmol/L    Chloride 102 96 - 112 mmol/L    Co2 24 20 - 33 mmol/L    Glucose 175 (H) 65 - 99 mg/dL    Bun 18 8 - 22 mg/dL    Creatinine 0.83 0.50 - 1.40 mg/dL    Calcium 7.2 (L) 8.5 - 10.5 mg/dL    Anion Gap 8.0 7.0 - 16.0   CBC WITH DIFFERENTIAL    Collection Time: 08/21/23  1:12 AM   Result Value Ref Range    WBC 9.8 4.8 - 10.8 K/uL    RBC 2.35 (L) 4.70 - 6.10 M/uL    Hemoglobin 7.1 (L) 14.0 - 18.0 g/dL    Hematocrit 21.5 (L) 42.0 - 52.0 %    MCV 91.5 81.4 - 97.8 fL    MCH 30.2 27.0  - 33.0 pg    MCHC 33.0 32.3 - 36.5 g/dL    RDW 51.0 (H) 35.9 - 50.0 fL    Platelet Count 112 (L) 164 - 446 K/uL    MPV 13.1 (H) 9.0 - 12.9 fL    Neutrophils-Polys 76.00 (H) 44.00 - 72.00 %    Lymphocytes 11.80 (L) 22.00 - 41.00 %    Monocytes 9.30 0.00 - 13.40 %    Eosinophils 1.40 0.00 - 6.90 %    Basophils 0.40 0.00 - 1.80 %    Immature Granulocytes 1.10 (H) 0.00 - 0.90 %    Nucleated RBC 0.40 (H) 0.00 - 0.20 /100 WBC    Neutrophils (Absolute) 7.47 (H) 1.82 - 7.42 K/uL    Lymphs (Absolute) 1.16 1.00 - 4.80 K/uL    Monos (Absolute) 0.91 (H) 0.00 - 0.85 K/uL    Eos (Absolute) 0.14 0.00 - 0.51 K/uL    Baso (Absolute) 0.04 0.00 - 0.12 K/uL    Immature Granulocytes (abs) 0.11 0.00 - 0.11 K/uL    NRBC (Absolute) 0.04 K/uL   MAGNESIUM    Collection Time: 08/21/23  1:12 AM   Result Value Ref Range    Magnesium 2.0 1.5 - 2.5 mg/dL   ESTIMATED GFR    Collection Time: 08/21/23  1:12 AM   Result Value Ref Range    GFR (CKD-EPI) 97 >60 mL/min/1.73 m 2       Radiology Review:  CT-ABDOMEN LIVER FOR HEPATIC MASS (CIRRHOSIS)   Final Result      Hepatic cirrhosis with hyperenhancing mass with washout the RIGHT lobe liver, segment 6, measuring 5 cm, surrounding hyperemia and pseudocapsule, most consistent with hepatocellular carcinoma.   Multiple small cystic lesions primarily in the inferior RIGHT lobe liver.   Enlarged kashmir hepatis, portacaval, and celiac lymph nodes, likely metastatic.   Cholelithiasis.   Gastric varices at the fundus with probable splenorenal shunt.      LI-RADS: LR-4: probably HCC      MR-ABDOMEN-WITH & W/O   Final Result         1. The 4.6 cm mass in the right hepatic lobe with characteristics consistent with HCC.   2. Cirrhotic appearing liver.   3. Cholelithiasis.         CTA ABDOMEN PELVIS W & W/O POST PROCESS   Final Result      1.  No evidence of active arterial gastrointestinal hemorrhage.   2.  Likely enteritis.   3.  Cirrhosis with 4.6 cm hepatic mass, likely HCC. Recommend nonemergent MRI hepatic  protocol.   4.  Atherosclerotic changes. No aneurysm or dissection.   5.  Punctate, nonobstructing left renal stone.   6.  Cholelithiasis.      Study unavailable for interpretation until 8/16/2023 5:59 PM due to unknown issue with hospital PACS system.      DX-CHEST-PORTABLE (1 VIEW)   Final Result      No acute cardiac or pulmonary abnormalities are identified.            MDM (Data Review):   -Records reviewed and summarized in current documentation  -I personally reviewed and interpreted the laboratory results  -I personally reviewed the radiology images    Assessment/Recommendations:  63-year-old male with no significant GI history presented via the emergency room with newly diagnosed cirrhosis, liver lesions, GI bleeding.  He underwent EGD which revealed esophageal varices status post 7 banding, portal hypertensive gastropathy.  Hep C antibody reactive, PCR pending.  MRI liver protocol reveals 4.6 cm mass in the right hepatic lobe consistent with HCC and cirrhosis.  Also revealed cholelithiasis.    Recommendations:  - Hemoglobin drifting down, will continue close monitoring for bleeding   - Large episode of melena this afternoon, expect some melena post variceal banding   - Recommend consideration of IR consult for CARTO or BRTO for gastric variceal coil, we reviewed the imaging and he has extensive gastric varices  -Continue to trend H&H, transfuse for hemoglobin less than 7  - May need repeat EGD   - PPI twice daily  - Agree with octreotide, reasonable to decrease portal pressures   - Will need consideration of beta-blocker on discharge  - CA 19-9, AFP, and CEA within normal limits (not all HCC tumors are AFP positive or secrete AFP)  - Hep C PCR 6.42, placed order for genotype testing  - Dr. Jiang will follow with him outpatient for HCC treatment    GI team will continue to follow    Plan of care discussed with patient, RN, Dr. Pearl, Dr. Og    Core Quality Measures   Reviewed items::  Labs,  Medications and Radiology reports reviewed

## 2023-08-21 NOTE — CARE PLAN
The patient is Stable - Low risk of patient condition declining or worsening    Shift Goals  Clinical Goals: Transfusion, GI following, OOB for meals, need BM  Patient Goals: Rest  Family Goals: na    Progress made toward(s) clinical / shift goals:  Transfusion      Problem: Pain - Standard  Goal: Alleviation of pain or a reduction in pain to the patient’s comfort goal  Outcome: Progressing     Problem: Knowledge Deficit - Standard  Goal: Patient and family/care givers will demonstrate understanding of plan of care, disease process/condition, diagnostic tests and medications  Outcome: Progressing     Problem: Risk for Fluid Volume Deficit Related to Bleeding  Goal: Fluid volume balance will be maintained  Outcome: Progressing  Goal: Patient will show no signs and symptoms of excessive bleeding  Outcome: Progressing     Problem: Risk for Bleeding  Goal: Patient will take measures to prevent bleeding and recognizes signs of bleeding that need to be reported immediately to a health care professional  Outcome: Progressing  Goal: Patient will not experience bleeding as evidenced by normal blood pressure, stable hematocrit and hemoglobin levels and desired ranges for coagulation profiles  Outcome: Progressing     Problem: Hemodynamics  Goal: Patient's hemodynamics, fluid balance and neurologic status will be stable or improve  Outcome: Progressing       Patient is not progressing towards the following goals:

## 2023-08-21 NOTE — CARE PLAN
The patient is Watcher - Medium risk of patient condition declining or worsening    Shift Goals  Clinical Goals: Hgb will remain stable  Patient Goals: feel better  Family Goals: na    Progress made toward(s) clinical / shift goals:      Problem: Pain - Standard  Goal: Alleviation of pain or a reduction in pain to the patient’s comfort goal  Outcome: Progressing  Note: Pt denies pain at this time     Problem: Knowledge Deficit - Standard  Goal: Patient and family/care givers will demonstrate understanding of plan of care, disease process/condition, diagnostic tests and medications  Outcome: Progressing  Note: Discussed orthostatic BP with patient and need to call before any ambulation.        Patient is not progressing towards the following goals:      Problem: Hemodynamics  Goal: Patient's hemodynamics, fluid balance and neurologic status will be stable or improve  Outcome: Not Progressing  Note: Patient continues to have orthostatic hypotension. Pt had episode of dizziness while in bathroom, vital signs when returned to bed were stable. Hgb ordered at this time.

## 2023-08-21 NOTE — CARE PLAN
The patient is Stable - Low risk of patient condition declining or worsening    Shift Goals  Clinical Goals: Monitor hemodynamics, VS  Patient Goals: get better, sleep  Family Goals: na    Progress made toward(s) clinical / shift goals:   transfusion, octreotide gtt      Problem: Pain - Standard  Goal: Alleviation of pain or a reduction in pain to the patient’s comfort goal  Outcome: Progressing     Problem: Knowledge Deficit - Standard  Goal: Patient and family/care givers will demonstrate understanding of plan of care, disease process/condition, diagnostic tests and medications  Outcome: Progressing     Problem: Risk for Fluid Volume Deficit Related to Bleeding  Goal: Fluid volume balance will be maintained  Outcome: Progressing  Goal: Patient will show no signs and symptoms of excessive bleeding  Outcome: Progressing     Problem: Risk for Bleeding  Goal: Patient will take measures to prevent bleeding and recognizes signs of bleeding that need to be reported immediately to a health care professional  Outcome: Progressing  Goal: Patient will not experience bleeding as evidenced by normal blood pressure, stable hematocrit and hemoglobin levels and desired ranges for coagulation profiles  Outcome: Progressing       Patient is not progressing towards the following goals:

## 2023-08-21 NOTE — PROGRESS NOTES
0700 - Report received from April RN at patient's bedside. Patient resting in bed quietly with no complaints at this time. Telemetry monitor intact et functioning. Call light and belongings within reach, safety measures intact, white board updated.     0930 - Messaged provider with updated hgb of 6.9.     1355 - Blood started as ordered. Patient tolerating well.     1450 - Patient up to bathroom for BM. Large black stool, some blood noted. Reported to GI NP and provider.     1650 - Blood completed. Patient tolerated well. Up to chair for all meals today.     1900 - Patient up to chair for dinner. No dizziness or syncope with standing/moving this shift.     1915 - Reported off to April ADENIKE

## 2023-08-21 NOTE — PROGRESS NOTES
Monitor Summary:   Rhythm: NSR  Rate: 63 - 80  Measurement: .14/.09/.43  Ectopy: couplets. PVC    12 hour chart check

## 2023-08-21 NOTE — ASSESSMENT & PLAN NOTE
Seen on CT and MRI liver, 4.6cm in right hepatic lobe  Dr. Jiang met with the patient, will follow-up outpatient

## 2023-08-21 NOTE — DISCHARGE PLANNING
Case Management Discharge Planning    Admission Date: 8/16/2023  GMLOS: 4.9  ALOS: 5    6-Clicks ADL Score: 24  6-Clicks Mobility Score: 23      Anticipated Discharge Dispo: Discharge Disposition: Discharged to home/self care (01)    DME Needed: No    Action(s) Taken: DC Assessment Complete (See below)    Escalations Completed: Pending Discharge Destination    Medically Clear: No    Next Steps: patient is living with a roommate in roommate's  apartment. Patient sits the son has issue with the situation and he may not be able to return. He is not expecting to be able to and feels he is homeless. He is going to try friends. He states he will not go to a shelter. At this point he has no idea where he is going at d/c. Resources for homelessness and [facility] given to patient. CM will follow for discharge needs.     Barriers to Discharge: Medical clearance and Homelessness    Is the patient up for discharge tomorrow: No  Care Transition Team Assessment  Case Management role explained to and understanding acknowledged by Patient. Demographics verified. 62 y/o male prsent with Melena, positive FOBT. Cirrhotic liver with hepatic mass. Dx active GIB. C/o weak and dizzy.  Plan pt/ot, npo for endoscopy. Ocereotide. IV protnic.  IV abx Labs, GI bleeding work up.       Orientation Level: Oriented X 4  Information  Source  Information Given By: Patient  Informant's Name: Tyrone  Who is responsible for making decisions for patient? : Patient    Readmission Evaluation  Is this a readmission?: No    Elopement Risk  Legal Hold: No  Ambulatory or Self Mobile in Wheelchair: Yes  Disoriented: No  Psychiatric Symptoms: None  History of Wandering: No  Elopement this Admit: No  Vocalizing Wanting to Leave: No  Displays Behaviors, Body Language Wanting to Leave: No-Not at Risk for Elopement  Elopement Risk: Not at Risk for Elopement    Interdisciplinary Discharge Planning  Does Admitting Nurse Feel This Could be a Complex Discharge?:  Yes  Primary Care Physician: none  Lives with - Patient's Self Care Capacity: Alone and Able to Care For Self (has a roommate but no supoort from the roommate.)  Patient or legal guardian wants to designate a caregiver: No  Support Systems: None (states he is not able to return to his current living situation. has friends but not sure if they will help)  Housing / Facility: 1 Story Apartment / Condo  Do You Take your Prescribed Medications Regularly: No  Reasons Why Not Taking Medications : Financial Reasons (none prescibed. no doctor)  Able to Return to Previous ADL's: Yes  Mobility Issues: No  Prior Services: None, Home-Independent  Patient Prefers to be Discharged to:: anywhere he can find  Assistance Needed: Yes (resources give for homelessness and [facility].)  Durable Medical Equipment: Not Applicable    Discharge Preparedness  What is your plan after discharge?: Other (comment) (uncertain)  What are your discharge supports?: Other (comment) (none)  Prior Functional Level: Independent with Activities of Daily Living    Functional Assesment  Prior Functional Level: Independent with Activities of Daily Living    Finances  Financial Barriers to Discharge: Yes  Average Monthly Income: 1099 $  Source of Income: Social Security  Prescription Coverage: No (patient does take any medications)    Vision / Hearing Impairment  Vision Impairment : Yes  Right Eye Vision: Wears Glasses  Left Eye Vision: Wears Glasses  Hearing Impairment : No     Advance Directive  Advance Directive?: None  Advance Directive offered?: AD Booklet given    Domestic Abuse  Have you ever been the victim of abuse or violence?: No  Physical Abuse or Sexual Abuse: No  Verbal Abuse or Emotional Abuse: No  Possible Abuse/Neglect Reported to:: Not Applicable    Psychological Assessment  History of Substance Abuse: Alcohol, Methamphetamine  Date Last Used - Alcohol: 30 yrs ago  Date Last Used - Methamphetamine: 20 yers ago  Substance Abuse Comments:  got help  History of Psychiatric Problems: No  Non-compliant with Treatment: No  Newly Diagnosed Illness: No    Discharge Risks or Barriers  Discharge risks or barriers?: Homeless / couch surfing  Patient risk factors: Vulnerable adult    Anticipated Discharge Information  Discharge Disposition: Discharged to home/self care (01)

## 2023-08-21 NOTE — PROGRESS NOTES
Assumed care of patient, bedside report received from Ange JEONG. Updated on POC, call light within reach and fall precautions in place. Bed locked and in lowest position. Patient instructed to call for assistance before getting out of bed. All questions answered, no other needs at this time. Octreotide gtt verified.

## 2023-08-21 NOTE — PROGRESS NOTES
0700 - Report received from Barrie JEONG at patient's bedside. Patient resting in bed quietly with no complaints at this time. Telemetry monitor intact et functioning. Call light and belongings within reach, safety measures intact, white board updated. RBCs infusing as ordered.     1930 - Reported off to April RN

## 2023-08-22 PROBLEM — R73.9 HYPERGLYCEMIA: Status: ACTIVE | Noted: 2023-08-22

## 2023-08-22 PROBLEM — K74.60 CIRRHOSIS (HCC): Status: ACTIVE | Noted: 2023-08-22

## 2023-08-22 LAB
HCT VFR BLD AUTO: 23.9 % (ref 42–52)
HCT VFR BLD AUTO: 24.1 % (ref 42–52)
HCT VFR BLD AUTO: 24.7 % (ref 42–52)
HGB BLD-MCNC: 7.9 G/DL (ref 14–18)
HGB BLD-MCNC: 8 G/DL (ref 14–18)
HGB BLD-MCNC: 8 G/DL (ref 14–18)

## 2023-08-22 PROCEDURE — 36415 COLL VENOUS BLD VENIPUNCTURE: CPT

## 2023-08-22 PROCEDURE — 85014 HEMATOCRIT: CPT

## 2023-08-22 PROCEDURE — 770020 HCHG ROOM/CARE - TELE (206)

## 2023-08-22 PROCEDURE — 700111 HCHG RX REV CODE 636 W/ 250 OVERRIDE (IP)

## 2023-08-22 PROCEDURE — 99233 SBSQ HOSP IP/OBS HIGH 50: CPT | Performed by: INTERNAL MEDICINE

## 2023-08-22 PROCEDURE — 99232 SBSQ HOSP IP/OBS MODERATE 35: CPT | Performed by: NURSE PRACTITIONER

## 2023-08-22 PROCEDURE — 700102 HCHG RX REV CODE 250 W/ 637 OVERRIDE(OP): Performed by: NURSE PRACTITIONER

## 2023-08-22 PROCEDURE — 700105 HCHG RX REV CODE 258: Performed by: STUDENT IN AN ORGANIZED HEALTH CARE EDUCATION/TRAINING PROGRAM

## 2023-08-22 PROCEDURE — C9113 INJ PANTOPRAZOLE SODIUM, VIA: HCPCS

## 2023-08-22 PROCEDURE — A9270 NON-COVERED ITEM OR SERVICE: HCPCS | Performed by: NURSE PRACTITIONER

## 2023-08-22 PROCEDURE — 700111 HCHG RX REV CODE 636 W/ 250 OVERRIDE (IP): Mod: JA | Performed by: STUDENT IN AN ORGANIZED HEALTH CARE EDUCATION/TRAINING PROGRAM

## 2023-08-22 PROCEDURE — 85018 HEMOGLOBIN: CPT

## 2023-08-22 RX ADMIN — PANTOPRAZOLE SODIUM 40 MG: 40 INJECTION, POWDER, FOR SOLUTION INTRAVENOUS at 16:34

## 2023-08-22 RX ADMIN — OCTREOTIDE ACETATE 50 MCG/HR: 200 INJECTION, SOLUTION INTRAVENOUS; SUBCUTANEOUS at 14:58

## 2023-08-22 RX ADMIN — POLYETHYLENE GLYCOL 3350 1 PACKET: 17 POWDER, FOR SOLUTION ORAL at 04:28

## 2023-08-22 RX ADMIN — PANTOPRAZOLE SODIUM 40 MG: 40 INJECTION, POWDER, FOR SOLUTION INTRAVENOUS at 04:28

## 2023-08-22 ASSESSMENT — ENCOUNTER SYMPTOMS
SORE THROAT: 0
MYALGIAS: 0
COUGH: 0
DEPRESSION: 0
SHORTNESS OF BREATH: 0
BLOOD IN STOOL: 0
CHILLS: 0
DIZZINESS: 0
BACK PAIN: 0
FLANK PAIN: 0
DIARRHEA: 0
NERVOUS/ANXIOUS: 0
WEAKNESS: 0
FEVER: 0
VOMITING: 0
BLURRED VISION: 0
ABDOMINAL PAIN: 0
CONSTIPATION: 0
HEARTBURN: 0
NAUSEA: 0

## 2023-08-22 ASSESSMENT — FIBROSIS 4 INDEX
FIB4 SCORE: 4.06
FIB4 SCORE: 4.06

## 2023-08-22 ASSESSMENT — PAIN DESCRIPTION - PAIN TYPE: TYPE: ACUTE PAIN

## 2023-08-22 NOTE — ASSESSMENT & PLAN NOTE
No prior diagnosis of diabetes  A1c is 6.5%, dietitian consult and will need monitoring outpatient

## 2023-08-22 NOTE — PROGRESS NOTES
Assumed care of patient, bedside report received from Ange JEONG. Updated on POC, call light within reach and fall precautions in place. Bed locked and in lowest position. Patient instructed to call for assistance before getting out of bed. All questions answered, no other needs at this time. Patient denies dizziness at this time.

## 2023-08-22 NOTE — PROGRESS NOTES
..Gastroenterology Progress Note               Author:  GISELA Loepz Date & Time Created: 8/22/2023 1:30 PM       Patient ID:  Name:             Tyrone Giraldo  YOB: 1960  Age:                 63 y.o.  male  MRN:               [id number]        Medical Decision Making, by Problem:  Active Hospital Problems    Diagnosis     Hyperglycemia [R73.9]     Cirrhosis (HCC) [K74.60]     Hepatocellular carcinoma (HCC) [C22.0]     Liver mass [R16.0]     GI bleed [K92.2]     Acute blood loss anemia [D62]     Weakness [R53.1]     History of MI (myocardial infarction) [I25.2]            Presenting Chief Complaint:  Upper GI bleeding      Interval History:  8/22/2023: Patient seen at bedside.  Reports ongoing fatigue but otherwise denies nausea, vomiting, abdominal pain.  Hemoglobin stable.    8/21/2023: Patient seen and examined this morning.  Reports feeling okay, still with fatigue reports he almost passed out when using the toilet last night.  Notified by RN later in the day that he had a large black stool. repeat hemoglobin at repeat hemoglobin at 9 AM was 6.9, currently receiving 1 unit PRBC and due to have hemoglobin rechecked posttransfusion.    8/20/2023: Patient seen and examined. Patient denies BM. No abdominal pain, orthostatic positive. Hgb dropped to 6.6 again overnight, received 1 unit PRBC with repeat CBC pending, Iron studies ordered.     8/19/2023: Patient seen this morning, I woke him from sleep, he does not have any complaints.  Denies having a bowel movement ever 1 is charted overnight. Hgb down to 6.5 overnight from 7.6 yesterday requiring 1 unit PRBC.  Platelets 121.  Recheck hemoglobin 7.7    8/18/2023: Patient seen and examined.  Reports feeling hungry.  No bowel movement but is having dark black smears per nursing. VSS. Hgb 7.9<9.6. Tumor markers not ordered, placed.  MRI consistent with HCC, Hep C PCR pending    8/17/2023: EGD with Dr. Zendejas:  Esophageal varices, 3 columns, mod  size, no active bleeding now, s/p 7 banding due to size.   Portal hypertensive gastropathy with scattered oozing in body, no treatment provided. Could be bleeding more before.   No overt gastric varices.   Some remaining blood in fundus.   Duodenum grossly normal.       Hospital Medications:  Current Facility-Administered Medications   Medication Dose Frequency Provider Last Rate Last Admin    polyethylene glycol/lytes (Miralax) PACKET 1 Packet  1 Packet DAILY GISELA Jordan   1 Packet at 08/22/23 0428    Pharmacy Consult Request ...Intranasal Naloxone Distribution   PHARMACY TO DOSE Everardo Pearl M.D.        octreotide (SandoSTATIN) 1,250 mcg in  mL Infusion  50 mcg/hr Continuous Everardo Pearl M.D. 10 mL/hr at 08/21/23 1136 50 mcg/hr at 08/21/23 1136    labetalol (Normodyne/Trandate) injection 10 mg  10 mg Q4HRS PRN Pop Alves M.D.        pantoprazole (Protonix) injection 40 mg  40 mg BID Pop Alves M.D.   40 mg at 08/22/23 0428   Last reviewed on 8/16/2023  7:17 PM by Mary Bryan GERMAN       Review of Systems:  Review of Systems   Constitutional:  Positive for malaise/fatigue. Negative for chills and fever.   HENT:  Negative for congestion, hearing loss and sore throat.    Eyes:  Negative for blurred vision.   Respiratory:  Negative for cough and shortness of breath.    Cardiovascular:  Negative for chest pain and leg swelling.   Gastrointestinal:  Negative for abdominal pain, blood in stool, constipation, diarrhea, heartburn, melena, nausea and vomiting.   Genitourinary:  Negative for dysuria and flank pain.   Musculoskeletal:  Negative for back pain and myalgias.   Skin:  Negative for rash.   Neurological:  Negative for dizziness and weakness.   Psychiatric/Behavioral:  Negative for depression. The patient is not nervous/anxious.    All other systems reviewed and are negative.        Vital signs:  Weight/BMI: Body mass index is 22.93 kg/m².  /60   Pulse 77   Temp 37.1 °C  (98.8 °F) (Temporal)   Resp 16   Ht 1.829 m (6')   Wt 76.7 kg (169 lb 1.5 oz)   SpO2 95%   Vitals:    08/22/23 0006 08/22/23 0418 08/22/23 0748 08/22/23 1245   BP:  121/66 106/57 103/60   Pulse:  74 67 77   Resp:  18 17 16   Temp:  36.5 °C (97.7 °F) 37 °C (98.6 °F) 37.1 °C (98.8 °F)   TempSrc:  Temporal Temporal Temporal   SpO2:  97% 92% 95%   Weight: 76.7 kg (169 lb 1.5 oz)      Height:         Oxygen Therapy:  Pulse Oximetry: 95 %, O2 (LPM): 0, O2 Delivery Device: None - Room Air    Intake/Output Summary (Last 24 hours) at 8/22/2023 1330  Last data filed at 8/21/2023 2100  Gross per 24 hour   Intake 825.42 ml   Output --   Net 825.42 ml           Physical Exam:  Physical Exam  Vitals and nursing note reviewed.   Constitutional:       General: He is not in acute distress.     Appearance: Normal appearance. He is not ill-appearing.      Comments: Thin elderly male appears older than stated age   HENT:      Head: Normocephalic and atraumatic.      Nose: Nose normal. No congestion.      Mouth/Throat:      Mouth: Mucous membranes are moist.      Pharynx: Oropharynx is clear. No oropharyngeal exudate.   Eyes:      General: No scleral icterus.     Extraocular Movements: Extraocular movements intact.      Conjunctiva/sclera: Conjunctivae normal.   Cardiovascular:      Rate and Rhythm: Normal rate and regular rhythm.      Pulses: Normal pulses.      Heart sounds: Murmur heard.   Pulmonary:      Effort: Pulmonary effort is normal.      Breath sounds: Normal breath sounds.   Abdominal:      General: Abdomen is flat. Bowel sounds are normal. There is no distension.      Palpations: Abdomen is soft.      Tenderness: There is no abdominal tenderness. There is no guarding.   Musculoskeletal:         General: Normal range of motion.      Cervical back: Normal range of motion and neck supple.   Skin:     General: Skin is warm and dry.      Capillary Refill: Capillary refill takes less than 2 seconds.      Coloration: Skin is  pale. Skin is not jaundiced.   Neurological:      General: No focal deficit present.      Mental Status: He is alert and oriented to person, place, and time. Mental status is at baseline.   Psychiatric:         Mood and Affect: Mood normal.         Behavior: Behavior normal.         Thought Content: Thought content normal.         Judgment: Judgment normal.             Labs:  Recent Labs     08/21/23  0112   SODIUM 134*   POTASSIUM 3.9   CHLORIDE 102   CO2 24   BUN 18   CREATININE 0.83   MAGNESIUM 2.0   CALCIUM 7.2*       Recent Labs     08/21/23  0112   GLUCOSE 175*       Recent Labs     08/20/23  0958 08/21/23  0112   WBC 7.1 9.8   NEUTSPOLYS  --  76.00*   LYMPHOCYTES  --  11.80*   MONOCYTES  --  9.30   EOSINOPHILS  --  1.40   BASOPHILS  --  0.40       Recent Labs     08/20/23  0403 08/20/23  0958 08/20/23  1640 08/21/23  0112 08/21/23  0904 08/21/23  1807 08/22/23  0121 08/22/23  0951   RBC  --  2.47*  --  2.35*  --   --   --   --    HEMOGLOBIN 6.6* 7.5*   < > 7.1*   < > 8.2* 7.9* 8.0*   HEMATOCRIT 20.4* 22.4*   < > 21.5*   < > 24.7* 23.9* 24.7*   PLATELETCT  --  95*  --  112*  --   --   --   --    IRON 45*  --   --   --   --   --   --   --    FERRITIN 233.0  --   --   --   --   --   --   --    TOTIRONBC 231*  --   --   --   --   --   --   --     < > = values in this interval not displayed.       Recent Results (from the past 24 hour(s))   HEMOGLOBIN AND HEMATOCRIT    Collection Time: 08/21/23  6:07 PM   Result Value Ref Range    Hemoglobin 8.2 (L) 14.0 - 18.0 g/dL    Hematocrit 24.7 (L) 42.0 - 52.0 %   HEMOGLOBIN AND HEMATOCRIT    Collection Time: 08/22/23  1:21 AM   Result Value Ref Range    Hemoglobin 7.9 (L) 14.0 - 18.0 g/dL    Hematocrit 23.9 (L) 42.0 - 52.0 %   HEMOGLOBIN AND HEMATOCRIT    Collection Time: 08/22/23  9:51 AM   Result Value Ref Range    Hemoglobin 8.0 (L) 14.0 - 18.0 g/dL    Hematocrit 24.7 (L) 42.0 - 52.0 %       Radiology Review:  IR-US GUIDED PIV   Final Result    Ultrasound-guided  PERIPHERAL IV INSERTION performed by    qualified nursing staff as above.      CT-ABDOMEN LIVER FOR HEPATIC MASS (CIRRHOSIS)   Final Result      Hepatic cirrhosis with hyperenhancing mass with washout the RIGHT lobe liver, segment 6, measuring 5 cm, surrounding hyperemia and pseudocapsule, most consistent with hepatocellular carcinoma.   Multiple small cystic lesions primarily in the inferior RIGHT lobe liver.   Enlarged kashmir hepatis, portacaval, and celiac lymph nodes, likely metastatic.   Cholelithiasis.   Gastric varices at the fundus with probable splenorenal shunt.      LI-RADS: LR-4: probably HCC      MR-ABDOMEN-WITH & W/O   Final Result         1. The 4.6 cm mass in the right hepatic lobe with characteristics consistent with HCC.   2. Cirrhotic appearing liver.   3. Cholelithiasis.         CTA ABDOMEN PELVIS W & W/O POST PROCESS   Final Result      1.  No evidence of active arterial gastrointestinal hemorrhage.   2.  Likely enteritis.   3.  Cirrhosis with 4.6 cm hepatic mass, likely HCC. Recommend nonemergent MRI hepatic protocol.   4.  Atherosclerotic changes. No aneurysm or dissection.   5.  Punctate, nonobstructing left renal stone.   6.  Cholelithiasis.      Study unavailable for interpretation until 8/16/2023 5:59 PM due to unknown issue with hospital PACS system.      DX-CHEST-PORTABLE (1 VIEW)   Final Result      No acute cardiac or pulmonary abnormalities are identified.            MDM (Data Review):   -Records reviewed and summarized in current documentation  -I personally reviewed and interpreted the laboratory results  -I personally reviewed the radiology images    Assessment/Recommendations:  63-year-old male with no significant GI history presented via the emergency room with newly diagnosed cirrhosis, liver lesions, GI bleeding.  He underwent EGD which revealed esophageal varices status post 7 banding, portal hypertensive gastropathy.  Hep C antibody reactive, PCR pending.  MRI liver protocol  reveals 4.6 cm mass in the right hepatic lobe consistent with HCC and cirrhosis.  Also revealed cholelithiasis.    Recommendations:  Monitor H/H and for signs of rebleeding-transfuse if necessary  Gastric varices seen on imaging but none visually during endoscopic evaluation.  Given the absence of bleeding, may hold off from CARTO or NELLY at this time  Continue PPI twice daily  Okay to discontinue octreotide after 72 hours, consider nonselective beta-blocker  CA 19-9, AFP, and CEA within normal limits (not all HCC tumors are AFP positive or secrete AFP)  Hep C PCR 6.42, placed order for genotype testing  Dr. Jiang will follow with him outpatient for HCC treatment    No further interventions from acute GI team.  GI to sign off.  Please reconsult for any further questions or concerns.    Discussed with patient, Dr. Caballero.      Core Quality Measures   Reviewed items::  Labs, Medications and Radiology reports reviewed

## 2023-08-22 NOTE — PROGRESS NOTES
Monitor Summary:   Rhythm: NSR  Rate: 68 - 80  Measurement: .14/.09/.44  Ectopy: Rare PVC     12 hour chart check

## 2023-08-22 NOTE — CARE PLAN
The patient is Watcher - Medium risk of patient condition declining or worsening    Shift Goals  Clinical Goals: Stable Hgb  Patient Goals: Rest  Family Goals: GIOVANNI    Progress made toward(s) clinical / shift goals:      Problem: Pain - Standard  Goal: Alleviation of pain or a reduction in pain to the patient’s comfort goal  Description: Target End Date:  Prior to discharge or change in level of care    Document on Vitals flowsheet    1.  Document pain using the appropriate pain scale per order or unit policy  2.  Educate and implement non-pharmacologic comfort measures (i.e. relaxation, distraction, massage, cold/heat therapy, etc.)  3.  Pain management medications as ordered  4.  Reassess pain after pain med administration per policy  5.  If opiods administered assess patient's response to pain medication is appropriate per POSS sedation scale  6.  Follow pain management plan developed in collaboration with patient and interdisciplinary team (including palliative care or pain specialists if applicable)  Outcome: Progressing     Problem: Knowledge Deficit - Standard  Goal: Patient and family/care givers will demonstrate understanding of plan of care, disease process/condition, diagnostic tests and medications  Description: Target End Date:  1-3 days or as soon as patient condition allows    Document in Patient Education    1.  Patient and family/caregiver oriented to unit, equipment, visitation policy and means for communicating concern  2.  Complete/review Learning Assessment  3.  Assess knowledge level of disease process/condition, treatment plan, diagnostic tests and medications  4.  Explain disease process/condition, treatment plan, diagnostic tests and medications  Outcome: Progressing       Patient is not progressing towards the following goals:

## 2023-08-22 NOTE — ASSESSMENT & PLAN NOTE
Newly found with cirrhosis  Positive for hep C antibody, PCR pending.  Also has used alcohol in the past  Need outpatient follow-up with liver specialist

## 2023-08-22 NOTE — CARE PLAN
The patient is Stable - Low risk of patient condition declining or worsening    Shift Goals  Clinical Goals: Hgb will remain stable  Patient Goals: feel better  Family Goals: na    Progress made toward(s) clinical / shift goals:      Problem: Pain - Standard  Goal: Alleviation of pain or a reduction in pain to the patient’s comfort goal  Outcome: Progressing  Note: Pt denies pain at this time     Problem: Hemodynamics  Goal: Patient's hemodynamics, fluid balance and neurologic status will be stable or improve  Outcome: Progressing  Note: Pt did not have any episodes of dizziness today. RN educated pt to tell staff if this occurs and to slowly adjust when sitting up or positioning.        Patient is not progressing towards the following goals:

## 2023-08-22 NOTE — PROGRESS NOTES
Hospital Medicine Daily Progress Note    Date of Service  8/22/2023    Chief Complaint  Tyrone Giraldo is a 63 y.o. male admitted 8/16/2023 with GIB    Hospital Course  62 yo man with history of MI who presented with melena and dizziness.  CT showed signs of liver cirrhosis and new anemia.  He was started on octreotide, ceftriaxone, IV PPI.  He underwent endoscopy with GI that showed 3 columns of moderate esophageal varices s/p 7 banding, portal hypertensive gastropathy with scattered oozing.  Patient was to continue octreotide along with IV PPI and course of IV ceftriaxone.  He continued to had signs of bleeding postprocedure needing transfusions.  MRI showed 4.6 cm liver mass, concerning for HCC.  CEA, CA 19-9, AFP were negative.  He was positive for hepatitis C antibody.  Hepatobiliary surgeon Dr. Jiang was consulted and recommended outpatient follow-up.    Interval Problem Update  Sinus on telemetry  Hemoglobin 7.9  He had dark stool yesterday afternoon. Denies abd pain or nausea, tolerating diet.    I have discussed this patient's plan of care and discharge plan at IDT rounds today with Case Management, Nursing, Nursing leadership, and other members of the IDT team.    Consultants/Specialty  GI    Code Status  Full Code    Disposition  The patient is not medically cleared for discharge to home or a post-acute facility.  Anticipate discharge to: home with close outpatient follow-up    I have placed the appropriate orders for post-discharge needs.    Review of Systems  Review of Systems   Constitutional:  Negative for malaise/fatigue.   Respiratory:  Negative for shortness of breath.    Cardiovascular:  Negative for chest pain.   Gastrointestinal:  Positive for melena. Negative for abdominal pain and constipation.   Neurological:  Negative for dizziness.        Physical Exam  Temp:  [36.3 °C (97.3 °F)-37.2 °C (99 °F)] 37 °C (98.6 °F)  Pulse:  [67-80] 67  Resp:  [16-18] 17  BP: ()/(49-66)  106/57  SpO2:  [92 %-97 %] 92 %    Physical Exam  Vitals and nursing note reviewed.   Constitutional:       General: He is not in acute distress.     Appearance: He is not toxic-appearing.   HENT:      Head: Normocephalic.      Mouth/Throat:      Mouth: Mucous membranes are moist.   Eyes:      General:         Right eye: No discharge.         Left eye: No discharge.   Cardiovascular:      Rate and Rhythm: Normal rate and regular rhythm.      Heart sounds: Murmur (systolic) heard.   Pulmonary:      Effort: Pulmonary effort is normal. No respiratory distress.      Breath sounds: No wheezing or rales.   Abdominal:      General: There is no distension.      Palpations: Abdomen is soft.      Tenderness: There is no abdominal tenderness. There is no guarding or rebound.   Musculoskeletal:         General: No swelling.      Cervical back: Neck supple.   Skin:     General: Skin is warm and dry.      Coloration: Skin is pale.   Neurological:      Mental Status: He is alert and oriented to person, place, and time.         Fluids    Intake/Output Summary (Last 24 hours) at 8/22/2023 1029  Last data filed at 8/21/2023 2100  Gross per 24 hour   Intake 975.42 ml   Output --   Net 975.42 ml       Laboratory  Recent Labs     08/20/23  0958 08/20/23  1640 08/21/23  0112 08/21/23  0904 08/21/23  1807 08/22/23  0121 08/22/23  0951   WBC 7.1  --  9.8  --   --   --   --    RBC 2.47*  --  2.35*  --   --   --   --    HEMOGLOBIN 7.5*   < > 7.1*   < > 8.2* 7.9* 8.0*   HEMATOCRIT 22.4*   < > 21.5*   < > 24.7* 23.9* 24.7*   MCV 90.7  --  91.5  --   --   --   --    MCH 30.4  --  30.2  --   --   --   --    MCHC 33.5  --  33.0  --   --   --   --    RDW 47.9  --  51.0*  --   --   --   --    PLATELETCT 95*  --  112*  --   --   --   --    MPV 13.3*  --  13.1*  --   --   --   --     < > = values in this interval not displayed.     Recent Labs     08/21/23  0112   SODIUM 134*   POTASSIUM 3.9   CHLORIDE 102   CO2 24   GLUCOSE 175*   BUN 18    CREATININE 0.83   CALCIUM 7.2*                   Imaging  IR-US GUIDED PIV   Final Result    Ultrasound-guided PERIPHERAL IV INSERTION performed by    qualified nursing staff as above.      CT-ABDOMEN LIVER FOR HEPATIC MASS (CIRRHOSIS)   Final Result      Hepatic cirrhosis with hyperenhancing mass with washout the RIGHT lobe liver, segment 6, measuring 5 cm, surrounding hyperemia and pseudocapsule, most consistent with hepatocellular carcinoma.   Multiple small cystic lesions primarily in the inferior RIGHT lobe liver.   Enlarged kashmir hepatis, portacaval, and celiac lymph nodes, likely metastatic.   Cholelithiasis.   Gastric varices at the fundus with probable splenorenal shunt.      LI-RADS: LR-4: probably HCC      MR-ABDOMEN-WITH & W/O   Final Result         1. The 4.6 cm mass in the right hepatic lobe with characteristics consistent with HCC.   2. Cirrhotic appearing liver.   3. Cholelithiasis.         CTA ABDOMEN PELVIS W & W/O POST PROCESS   Final Result      1.  No evidence of active arterial gastrointestinal hemorrhage.   2.  Likely enteritis.   3.  Cirrhosis with 4.6 cm hepatic mass, likely HCC. Recommend nonemergent MRI hepatic protocol.   4.  Atherosclerotic changes. No aneurysm or dissection.   5.  Punctate, nonobstructing left renal stone.   6.  Cholelithiasis.      Study unavailable for interpretation until 8/16/2023 5:59 PM due to unknown issue with hospital PACS system.      DX-CHEST-PORTABLE (1 VIEW)   Final Result      No acute cardiac or pulmonary abnormalities are identified.           Assessment/Plan  * GI bleed- (present on admission)  Assessment & Plan  endoscopy with GI that showed 3 columns of moderate esophageal varices s/p 7 banding, portal hypertensive gastropathy with scattered oozing  Completed course of ceftriaxone given his cirrhosis  Continue IV octreotide and PPI twice daily  GI soft diet  If he has recurrent bleed, may need to discuss with radiology for gastric variceal coil per  GI  Continue to monitor Hgb      Cirrhosis (HCC)  Assessment & Plan  Newly found with cirrhosis  Positive for hep C antibody, PCR pending.  Also has used alcohol in the past  Need outpatient follow-up with liver specialist    Hyperglycemia  Assessment & Plan  No prior diagnosis of diabetes  A1c is 6.5%, dietitian consult and will need monitoring outpatient    Hepatocellular carcinoma (HCC)  Assessment & Plan  Seen on CT and MRI liver, 4.6cm in right hepatic lobe  Dr. Jiang met with the patient, will follow-up outpatient    Liver mass- (present on admission)  Assessment & Plan  MRI shows 4.6 cm mass likely HCC  Hepatobiliary surgery consulted, follow up as outpatient    History of MI (myocardial infarction)  Assessment & Plan  History of 2 myocardial infarctions in 2006 and thousand 9, s/p stent placement.  Patient takes aspirin 325 mg at home.  No acute episode of chest pain, or troponins.  -Discontinued aspirin in the setting of active GI bleed.  Reassess at the time of discharge, may decrease dosing to 81 mg    Weakness  Assessment & Plan  Weakness for the past 2-3 days.  Most likely in the setting of anemia with active GI bleed.  PTOT - no needs    Acute blood loss anemia- (present on admission)  Assessment & Plan  Has had recurring anemia needing transfusion since EGD  Continue to monitor Hgb q8h, transfuse if less than 7         VTE prophylaxis:   SCDs/TEDs      I have performed a physical exam and reviewed and updated ROS and Plan today (8/22/2023). In review of yesterday's note (8/21/2023), there are no changes except as documented above.

## 2023-08-23 ENCOUNTER — PHARMACY VISIT (OUTPATIENT)
Dept: PHARMACY | Facility: MEDICAL CENTER | Age: 63
End: 2023-08-23
Payer: COMMERCIAL

## 2023-08-23 VITALS
HEART RATE: 80 BPM | WEIGHT: 165.34 LBS | OXYGEN SATURATION: 97 % | RESPIRATION RATE: 18 BRPM | BODY MASS INDEX: 22.4 KG/M2 | DIASTOLIC BLOOD PRESSURE: 69 MMHG | HEIGHT: 72 IN | TEMPERATURE: 97.7 F | SYSTOLIC BLOOD PRESSURE: 121 MMHG

## 2023-08-23 LAB
HCT VFR BLD AUTO: 23.8 % (ref 42–52)
HCT VFR BLD AUTO: 25 % (ref 42–52)
HCV RNA SERPL NAA+PROBE-ACNC: ABNORMAL IU/ML
HCV RNA SERPL NAA+PROBE-LOG IU: 6.45 LOG IU/ML
HCV RNA SERPL QL NAA+PROBE: DETECTED
HGB BLD-MCNC: 7.7 G/DL (ref 14–18)
HGB BLD-MCNC: 8.1 G/DL (ref 14–18)

## 2023-08-23 PROCEDURE — 85014 HEMATOCRIT: CPT

## 2023-08-23 PROCEDURE — C9113 INJ PANTOPRAZOLE SODIUM, VIA: HCPCS

## 2023-08-23 PROCEDURE — 99239 HOSP IP/OBS DSCHRG MGMT >30: CPT | Performed by: INTERNAL MEDICINE

## 2023-08-23 PROCEDURE — 36415 COLL VENOUS BLD VENIPUNCTURE: CPT

## 2023-08-23 PROCEDURE — A9270 NON-COVERED ITEM OR SERVICE: HCPCS | Performed by: NURSE PRACTITIONER

## 2023-08-23 PROCEDURE — RXMED WILLOW AMBULATORY MEDICATION CHARGE: Performed by: INTERNAL MEDICINE

## 2023-08-23 PROCEDURE — A9270 NON-COVERED ITEM OR SERVICE: HCPCS | Performed by: INTERNAL MEDICINE

## 2023-08-23 PROCEDURE — 700102 HCHG RX REV CODE 250 W/ 637 OVERRIDE(OP): Performed by: INTERNAL MEDICINE

## 2023-08-23 PROCEDURE — 85018 HEMOGLOBIN: CPT

## 2023-08-23 PROCEDURE — 700102 HCHG RX REV CODE 250 W/ 637 OVERRIDE(OP): Performed by: NURSE PRACTITIONER

## 2023-08-23 PROCEDURE — 700111 HCHG RX REV CODE 636 W/ 250 OVERRIDE (IP)

## 2023-08-23 RX ORDER — CARVEDILOL 3.12 MG/1
3.12 TABLET ORAL 2 TIMES DAILY WITH MEALS
Qty: 60 TABLET | Refills: 1 | Status: SHIPPED | OUTPATIENT
Start: 2023-08-23

## 2023-08-23 RX ORDER — CARVEDILOL 3.12 MG/1
3.12 TABLET ORAL 2 TIMES DAILY WITH MEALS
Status: DISCONTINUED | OUTPATIENT
Start: 2023-08-23 | End: 2023-08-23 | Stop reason: HOSPADM

## 2023-08-23 RX ORDER — OMEPRAZOLE 20 MG/1
20 CAPSULE, DELAYED RELEASE ORAL 2 TIMES DAILY
Qty: 30 CAPSULE | Refills: 1 | Status: ON HOLD | OUTPATIENT
Start: 2023-08-23 | End: 2023-12-14

## 2023-08-23 RX ORDER — OMEPRAZOLE 20 MG/1
20 CAPSULE, DELAYED RELEASE ORAL 2 TIMES DAILY
Status: DISCONTINUED | OUTPATIENT
Start: 2023-08-23 | End: 2023-08-23 | Stop reason: HOSPADM

## 2023-08-23 RX ORDER — ASPIRIN 81 MG/1
81 TABLET, CHEWABLE ORAL DAILY
Qty: 100 TABLET | Refills: 0 | Status: ON HOLD | OUTPATIENT
Start: 2023-08-30 | End: 2023-11-09

## 2023-08-23 RX ADMIN — PANTOPRAZOLE SODIUM 40 MG: 40 INJECTION, POWDER, FOR SOLUTION INTRAVENOUS at 04:30

## 2023-08-23 RX ADMIN — CARVEDILOL 3.12 MG: 3.12 TABLET, FILM COATED ORAL at 08:48

## 2023-08-23 RX ADMIN — POLYETHYLENE GLYCOL 3350 1 PACKET: 17 POWDER, FOR SOLUTION ORAL at 04:30

## 2023-08-23 NOTE — DISCHARGE INSTRUCTIONS
Discharge Instructions per Khushi Pate M.D.    DIAGNOSIS: Your were found to have liver cirrhosis and hepatitis C infection. This has caused bleeding in your esophagus and stomach. Do not drink any alcohol and avoid ibuprofen or naproxen. Since you take aspirin for your heart, please reduce the dose to 81 mg daily, do not start for at least another week to allow healing of your esophagus and stomach.  Continue taking omeprazole twice a day to help with healing of your esophagus and stomach.  Eat soft foods and avoid spicy or acidic foods for the next 2 weeks.  You were also found with a liver mass, please follow-up with Dr. Jiang in his clinic.  You were also noted to be newly diabetic, please follow-up with the primary care doctor in 2 to 3 weeks.    Return to ER if dizziness or weakness, signs of bleeding.

## 2023-08-23 NOTE — CARE PLAN
Problem: Knowledge Deficit - Standard  Goal: Patient and family/care givers will demonstrate understanding of plan of care, disease process/condition, diagnostic tests and medications  Description: Target End Date:  1-3 days or as soon as patient condition allows    Document in Patient Education    1.  Patient and family/caregiver oriented to unit, equipment, visitation policy and means for communicating concern  2.  Complete/review Learning Assessment  3.  Assess knowledge level of disease process/condition, treatment plan, diagnostic tests and medications  4.  Explain disease process/condition, treatment plan, diagnostic tests and medications  Outcome: Progressing  Note: Updated on POC, pt verbalizes understanding   The patient is Stable - Low risk of patient condition declining or worsening    Shift Goals  Clinical Goals: Discharge  Patient Goals: Comfort  Family Goals: GIOVANNI    Progress made toward(s) clinical / shift goals:  Discharge today    Patient is not progressing towards the following goals:

## 2023-08-23 NOTE — PROGRESS NOTES
IV x2 removed with tip in plac. Discharge medications and instructions thoroughly reviewed with pt, pt verbalized understanding without any immediate questions or concerns at this time. All personal belongings with pt. Pt off the floor via WC with staff escort to hospital entrance.

## 2023-08-23 NOTE — CARE PLAN
The patient is Watcher - Medium risk of patient condition declining or worsening    Shift Goals  Clinical Goals: stable hgb  Patient Goals: comfort, discharge planning  Family Goals: GIOVANNI    Progress made toward(s) clinical / shift goals:    Problem: Pain - Standard  Goal: Alleviation of pain or a reduction in pain to the patient’s comfort goal  Outcome: Progressing  Note: Declines need for intervention     Problem: Knowledge Deficit - Standard  Goal: Patient and family/care givers will demonstrate understanding of plan of care, disease process/condition, diagnostic tests and medications  Outcome: Progressing  Note: White board updated with POC and care team information during bedside report.      Problem: Risk for Fluid Volume Deficit Related to Bleeding  Goal: Patient will show no signs and symptoms of excessive bleeding  Outcome: Progressing

## 2023-08-23 NOTE — PROGRESS NOTES
Received bedside report. Patient Aox4. Denies pain at this time. Bedside table and call light are within reach. Safety and alarm precautions are in place.

## 2023-08-23 NOTE — DISCHARGE SUMMARY
"Discharge Summary    CHIEF COMPLAINT ON ADMISSION  Chief Complaint   Patient presents with    Bloody Stools     Woke up this morning noticed \"black/bloody stool\" -daily thinners -daily nsaids. Denies hx of stomach ulcer. Noted pale in triage.     Nausea     This am. Denies vomiting    Weakness     Generalized weakness since this am.       Reason for Admission  blood in stool      Admission Date  8/16/2023    CODE STATUS  Full Code    HPI & HOSPITAL COURSE  64 yo man with history of MI who presented with melena and dizziness.  CT showed signs of liver cirrhosis and new anemia.  He was started on octreotide, ceftriaxone, IV PPI.  He underwent endoscopy with GI that showed 3 columns of moderate esophageal varices s/p 7 banding, portal hypertensive gastropathy with scattered oozing.  Patient was to continue octreotide along with IV PPI and course of IV ceftriaxone.  He continued to have signs of bleeding postprocedure needing transfusions.  Decreases in hemoglobin eventually stabilized and he had no further signs of bleeding.  He will continue on Coreg with omeprazole and follow-up with GI outpatient.    MRI showed 4.6 cm liver mass, concerning for HCC.  CEA, CA 19-9, AFP were negative.  He was positive for hepatitis C antibody.  Hepatobiliary surgeon Dr. Jiang was consulted and recommended outpatient follow-up.  He was also noted to have A1c of 6.5% concerning for diabetes.  He received diet education and I discussed with him to follow-up outpatient with PCP.  For his 3 of MI with stent placement, I discussed with him on decreasing his aspirin to a baby aspirin and holding off on restarting for 1 to 2 weeks or until he can follow-up in clinic.    Therefore, he is discharged in good and stable condition to home with close outpatient follow-up.    The patient met 2-midnight criteria for an inpatient stay at the time of discharge.    Discharge Date  8/23/23    FOLLOW UP ITEMS POST DISCHARGE  GI -cirrhosis with " positive hep C antibody  Surgery oncology -liver mass  PCP -diabetes, A1c 6.5%    DISCHARGE DIAGNOSES  Principal Problem:    GI bleed (POA: Yes)  Active Problems:    Acute blood loss anemia (POA: Yes)    Weakness (POA: Unknown)    History of MI (myocardial infarction) (POA: Unknown)    Liver mass (POA: Yes)    Hepatocellular carcinoma (HCC) (POA: Unknown)    Hyperglycemia (POA: Unknown)    Cirrhosis (HCC) (POA: Unknown)  Resolved Problems:    * No resolved hospital problems. *      FOLLOW UP  Dale Jiang M.D.  [address] [place] [id number]  [phone]    Follow up in 3 week(s)        MEDICATIONS ON DISCHARGE     Medication List        START taking these medications        Instructions   Aspirin Low Dose 81 MG Chew chewable tablet  Start taking on: August 30, 2023  Generic drug: aspirin  Replaces: aspirin 325 MG Tabs   Chew 1 Tablet every day.  Dose: 81 mg     carvedilol 3.125 MG Tabs  Commonly known as: Coreg   Take 1 Tablet by mouth 2 times a day with meals.  Dose: 3.125 mg     omeprazole 20 MG delayed-release capsule  Commonly known as: PriLOSEC   Take 1 Capsule by mouth 2 times a day.  Dose: 20 mg            STOP taking these medications      aspirin 325 MG Tabs  Commonly known as: Asa  Replaced by: Aspirin Low Dose 81 MG Chew chewable tablet     DAILY VITAMIN PO              Allergies  Allergies   Allergen Reactions    Cortisone Rash    Morphine        DIET  Orders Placed This Encounter   Procedures    Diet Order Diet: Low Fiber(GI Soft)     Standing Status:   Standing     Number of Occurrences:   1     Order Specific Question:   Diet:     Answer:   Low Fiber(GI Soft) [2]       ACTIVITY  As tolerated.    CONSULTATIONS  GI    PROCEDURES    PROCEDURE:  ESOPHAGOGASTRODUODENOSCOPY     PHYSICIAN:  Dixon Zendejas MD. MPH.      IR-US GUIDED PIV   Final Result    Ultrasound-guided PERIPHERAL IV INSERTION performed by    qualified nursing staff as above.      CT-ABDOMEN LIVER FOR HEPATIC  MASS (CIRRHOSIS)   Final Result      Hepatic cirrhosis with hyperenhancing mass with washout the RIGHT lobe liver, segment 6, measuring 5 cm, surrounding hyperemia and pseudocapsule, most consistent with hepatocellular carcinoma.   Multiple small cystic lesions primarily in the inferior RIGHT lobe liver.   Enlarged kashmir hepatis, portacaval, and celiac lymph nodes, likely metastatic.   Cholelithiasis.   Gastric varices at the fundus with probable splenorenal shunt.      LI-RADS: LR-4: probably HCC      MR-ABDOMEN-WITH & W/O   Final Result         1. The 4.6 cm mass in the right hepatic lobe with characteristics consistent with HCC.   2. Cirrhotic appearing liver.   3. Cholelithiasis.         CTA ABDOMEN PELVIS W & W/O POST PROCESS   Final Result      1.  No evidence of active arterial gastrointestinal hemorrhage.   2.  Likely enteritis.   3.  Cirrhosis with 4.6 cm hepatic mass, likely HCC. Recommend nonemergent MRI hepatic protocol.   4.  Atherosclerotic changes. No aneurysm or dissection.   5.  Punctate, nonobstructing left renal stone.   6.  Cholelithiasis.      Study unavailable for interpretation until 8/16/2023 5:59 PM due to unknown issue with hospital PACS system.      DX-CHEST-PORTABLE (1 VIEW)   Final Result      No acute cardiac or pulmonary abnormalities are identified.            LABORATORY  Lab Results   Component Value Date    SODIUM 134 (L) 08/21/2023    POTASSIUM 3.9 08/21/2023    CHLORIDE 102 08/21/2023    CO2 24 08/21/2023    GLUCOSE 175 (H) 08/21/2023    BUN 18 08/21/2023    CREATININE 0.83 08/21/2023    CREATININE 1.1 05/22/2007        Lab Results   Component Value Date    WBC 9.8 08/21/2023    HEMOGLOBIN 8.1 (L) 08/23/2023    HEMATOCRIT 25.0 (L) 08/23/2023    PLATELETCT 112 (L) 08/21/2023        Total time of the discharge process exceeds 34 minutes.

## 2023-08-25 LAB — HCV GENTYP SERPL NAA+PROBE: NORMAL

## 2023-08-25 NOTE — H&P
Subjective:   9/19/2023 11:07 AM  Primary care physician: Pcp Pt States None  Referring Provider: Everardo Pearl MD    Chief Complaint:   Chief Complaint   Patient presents with    New Patient     Diagnosis:   1. Hepatocellular carcinoma (HCC)        2. Melena        3. Nausea        4. Coronary artery disease involving native coronary artery of native heart without angina pectoris        5. Esophageal varices without bleeding, unspecified esophageal varices type (HCC)        6. History of MI (myocardial infarction)        7. Alcoholic cirrhosis, unspecified whether ascites present (HCC)            History of presenting illness:  Tyrone Giraldo is a pleasant 63 y.o. old male with history of CAD, MI x2 and sciatica who presented to the emergency room on August 16, 2023 for bloody stools.  He has history of daily NSAIDs.  He was admitted under the [facility] team.  GI had also been consulted.  He subsequently underwent endoscopy on the 17th.  Endoscopy demonstrated esophageal varices status post banding, portal hypertension gastropathy and duodenum grossly normal.  CTA scan noted cirrhosis with 4.6 cm hepatic mass, likely HCC.  MRI demonstrated 0.6 cm mass in the right hepatic lobe with characteristics consistent with HCC.  As noted there is great amount of artifact.  AFP is 9, CA 19-9 17.4 and CEA 1.9. CT LIVER demonstrates hepatic cirrhosis with hyperenhancing mass with washout the RIGHT lobe liver, segment 6, measuring 5 cm, surrounding hyperemia and pseudocapsule, most consistent with hepatocellular carcinoma. He was discharged on 8/23/23. He was treated for lower leg cellulitis at [facility] on 9/9/23.    He presents today with follow-up status post being seen in the hospital.  The patient was found to have a hepatoma incidentally.  He came in with a lower GI bleed and melena.  He was treated conservatively but during his work-up he was found to have a mass in his right lobe of his liver.  He was discharged from the  [facility] on August 23 but has been treated in the [facility] ER for cellulitis over the right foot.  His AFP was normal but we do not have a DCP.  I have personally reviewed the patient's MRI and it does show a mass in segment 6/7 measuring about 4 to 5 cm well-circumscribed and well encapsulated.  It has enhancement and washout consistent with a hepatoma.  The patient has a history of drinking but he says he has not had a drink since his 30s which is about 30 years ago.  He was a heavy drinker and abused alcohol.  He is not taking any IV drugs.  He is not on any narcotics.  He is a smoker.  Presently he is here with his best friend Beba to discuss what could be done but he is a bit concerned because he just found out a day ago that he is homeless.  He is alert and oriented x3 and very pleasant.  He is anxious to take care of his problem.  He has not seen the nurse navigator or .    Past Medical History:   Diagnosis Date    CAD (coronary artery disease)     PCI    Esophageal varices with bleeding (HCC)     7 bands placed 08/17    MI (myocardial infarction) (HCC)     x2, 2006 and 2009    Sciatica      Past Surgical History:   Procedure Laterality Date    IN UPPER GI ENDOSCOPY,DIAGNOSIS N/A 8/17/2023    Procedure: GASTROSCOPY;  Surgeon: Dixon Zendejas M.D.;  Location: SURGERY SAME DAY [facility];  Service: Gastroenterology    IN UPPER GI ENDOSCOPY,LIGAT VARIX N/A 8/17/2023    Procedure: GASTROSCOPY, WITH BANDING;  Surgeon: Dixon Zendejas M.D.;  Location: SURGERY SAME DAY [facility];  Service: Gastroenterology    OTHER CARDIAC SURGERY      stent    VASECTOMY       Allergies   Allergen Reactions    Cortisone Rash    Morphine      Outpatient Encounter Medications as of 9/19/2023   Medication Sig Dispense Refill    omeprazole (PRILOSEC) 20 MG delayed-release capsule Take 1 Capsule by mouth 2 times a day. 30 Capsule 1    aspirin (ASA) 81 MG Chew Tab chewable tablet Chew 1 Tablet every day. 100 Tablet 0     carvedilol (COREG) 3.125 MG Tab Take 1 Tablet by mouth 2 times a day with meals. (Patient not taking: Reported on 9/19/2023) 60 Tablet 1     No facility-administered encounter medications on file as of 9/19/2023.     Social History     Socioeconomic History    Marital status: Single     Spouse name: Not on file    Number of children: Not on file    Years of education: Not on file    Highest education level: Not on file   Occupational History    Not on file   Tobacco Use    Smoking status: Some Days     Types: Cigarettes    Smokeless tobacco: Never    Tobacco comments:     10-12 cigs/day   Vaping Use    Vaping Use: Never used   Substance and Sexual Activity    Alcohol use: Not Currently     Comment: rare    Drug use: Yes     Types: Inhaled, Marijuana     Comment: marijuana      Sexual activity: Not on file   Other Topics Concern    Not on file   Social History Narrative    Not on file     Social Determinants of Health     Financial Resource Strain: Not on file   Food Insecurity: Not on file   Transportation Needs: Not on file   Physical Activity: Not on file   Stress: Not on file   Social Connections: Not on file   Intimate Partner Violence: Not on file   Housing Stability: Not on file      Social History     Tobacco Use   Smoking Status Some Days    Types: Cigarettes   Smokeless Tobacco Never   Tobacco Comments    10-12 cigs/day     Social History     Substance and Sexual Activity   Alcohol Use Not Currently    Comment: rare     Social History     Substance and Sexual Activity   Drug Use Yes    Types: Inhaled, Marijuana    Comment: marijuana        History reviewed. No pertinent family history.      Review of Systems   Skin:  Positive for rash.        Venous stasis ulcers present on his right lower extremity.  Cellulitis has resolved.   All other systems reviewed and are negative.       Objective:   /80 (BP Location: Right arm, Patient Position: Sitting, BP Cuff Size: Adult)   Pulse 94   Temp 37 °C (98.6 °F)  (Temporal)   Ht 1.829 m (6')   Wt 77.6 kg (171 lb)   SpO2 98%   BMI 23.19 kg/m²     Physical Exam  Vitals and nursing note reviewed.   Constitutional:       General: He is in acute distress.   HENT:      Nose: Nose normal.      Mouth/Throat:      Mouth: Mucous membranes are moist.      Pharynx: Oropharynx is clear.   Eyes:      Extraocular Movements: Extraocular movements intact.      Conjunctiva/sclera: Conjunctivae normal.      Pupils: Pupils are equal, round, and reactive to light.   Cardiovascular:      Rate and Rhythm: Normal rate and regular rhythm.      Pulses: Normal pulses.      Heart sounds: Normal heart sounds.   Pulmonary:      Effort: Pulmonary effort is normal.      Breath sounds: Normal breath sounds.   Abdominal:      General: Abdomen is flat. Bowel sounds are normal.      Palpations: Abdomen is soft.   Musculoskeletal:         General: Normal range of motion.      Cervical back: Normal range of motion.   Skin:     General: Skin is warm and dry.      Findings: Erythema present.      Comments: Venous stasis ulcers present on his right lower extremity.  Cellulitis has resolved.   Neurological:      General: No focal deficit present.      Mental Status: He is alert and oriented to person, place, and time.   Psychiatric:         Mood and Affect: Mood normal.         Behavior: Behavior normal.         Thought Content: Thought content normal.         Judgment: Judgment normal.         Labs   Latest Reference Range & Units 09/09/23 21:14   WBC 4.8 - 10.8 K/uL 3.2 (L)   RBC 4.70 - 6.10 M/uL 3.38 (L)   Hemoglobin 14.0 - 18.0 g/dL 9.1 (L)   Hematocrit 42.0 - 52.0 % 29.8 (L)   MCV 81.4 - 97.8 fL 88.2   MCH 27.0 - 33.0 pg 26.9 (L)   MCHC 32.3 - 36.5 g/dL 30.5 (L)   RDW 35.9 - 50.0 fL 48.8   Platelet Count 164 - 446 K/uL 80 (L)   (L): Data is abnormally low     Latest Reference Range & Units 09/09/23 21:14   Sodium 135 - 145 mmol/L 135   Potassium 3.6 - 5.5 mmol/L 4.0   Chloride 96 - 112 mmol/L 103   Co2 20 -  33 mmol/L 25   Anion Gap 7.0 - 16.0  7.0   Glucose 65 - 99 mg/dL 107 (H)   Bun 8 - 22 mg/dL 6 (L)   Creatinine 0.50 - 1.40 mg/dL 0.72   GFR (CKD-EPI) >60 mL/min/1.73 m 2 102   Calcium 8.5 - 10.5 mg/dL 8.1 (L)   Correct Calcium 8.5 - 10.5 mg/dL 8.7   AST(SGOT) 12 - 45 U/L 42   ALT(SGPT) 2 - 50 U/L 32   Alkaline Phosphatase 30 - 99 U/L 94   Total Bilirubin 0.1 - 1.5 mg/dL 0.5   Albumin 3.2 - 4.9 g/dL 3.3   Total Protein 6.0 - 8.2 g/dL 6.3   Globulin 1.9 - 3.5 g/dL 3.0   A-G Ratio g/dL 1.1   Lactic Acid 0.5 - 2.0 mmol/L 1.9   (H): Data is abnormally high  (L): Data is abnormally low       Latest Reference Range & Units Most Recent   Alpha Fetoprotein 0 - 9 ng/mL 9  8/18/23 08:28   Ca 19-9 0.0 - 35.0 U/mL 17.4  8/18/23 08:32   Carcinoembryonic Antigen 0.0 - 3.0 ng/mL 1.9  8/18/23 08:32     Imaging   CT LIVER 8/20/23  IMPRESSION:   Hepatic cirrhosis with hyperenhancing mass with washout the RIGHT lobe liver, segment 6, measuring 5 cm, surrounding hyperemia and pseudocapsule, most consistent with hepatocellular carcinoma.  Multiple small cystic lesions primarily in the inferior RIGHT lobe liver.  Enlarged kashmir hepatis, portacaval, and celiac lymph nodes, likely metastatic.  Cholelithiasis.  Gastric varices at the fundus with probable splenorenal shunt.     LI-RADS: LR-4: probably HCC    MRI 8/18/23  IMPRESSION:   1. The 4.6 cm mass in the right hepatic lobe with characteristics consistent with HCC.  2. Cirrhotic appearing liver.  3. Cholelithiasis.    CTA Abdomen/pelvis 8/16/223  IMPRESSION:  1.  No evidence of active arterial gastrointestinal hemorrhage.  2.  Likely enteritis.  3.  Cirrhosis with 4.6 cm hepatic mass, likely HCC. Recommend nonemergent MRI hepatic protocol.  4.  Atherosclerotic changes. No aneurysm or dissection.  5.  Punctate, nonobstructing left renal stone.  6.  Cholelithiasis.    Pathology  None    Procedures  None    Diagnosis:     1. Hepatocellular carcinoma (HCC)        2. Melena        3. Nausea         4. Coronary artery disease involving native coronary artery of native heart without angina pectoris        5. Esophageal varices without bleeding, unspecified esophageal varices type (HCC)        6. History of MI (myocardial infarction)        7. Alcoholic cirrhosis, unspecified whether ascites present (HCC)            Medical Decision Making:  Today's Assessment / Status / Plan:     In light of the present findings, the patient is a good candidate for a laparoscopic microwave thermal ablation of what appears to be a segment 6/7 hepatoma.  The rest of his liver is normal.  He does have changes of cirrhosis but they are mild.  We did discuss the procedure including the risks and benefits including bleeding, infection, the possibility of not being able to reach the tumor due to his anatomy and the location and the possibility of not a complete ablation.  He also understands that he was given an option for a chemoembolization.  His main problem is he just became homeless thus I am recommending he see the nurse navigator as well as our  to stress evaluation to see if we can help him with getting to his treatment.  He will contact me after he speaks to the nurse navigator and our  in the cancer center.    I, Dr. Jiang have entered, reviewed and confirmed the above diagnoses related to this patient on this date of service, 9/19/2023 11:07 AM.    He agreed and verbalized his agreement and understanding with the current plan. I answered all questions and concerns he has at this time and advised him to call at any time in the interim with questions or concerns in regards to his care.    Thank you for allowing me to participate in his care, I will continue to follow closely.       Please note that this dictation was created using voice recognition software. I have made every reasonable attempt to correct obvious errors, but I expect that there are errors of grammar and possibly content  that I did not discover before finalizing the note.     Thank you for this consultation and allowing me to participate in your patient's care. If I can be of further service please contact my office.

## 2023-08-31 ENCOUNTER — TELEPHONE (OUTPATIENT)
Dept: HEALTH INFORMATION MANAGEMENT | Facility: OTHER | Age: 63
End: 2023-08-31
Payer: MEDICARE

## 2023-09-09 ENCOUNTER — HOSPITAL ENCOUNTER (EMERGENCY)
Facility: MEDICAL CENTER | Age: 63
End: 2023-09-09
Attending: STUDENT IN AN ORGANIZED HEALTH CARE EDUCATION/TRAINING PROGRAM
Payer: MEDICARE

## 2023-09-09 ENCOUNTER — APPOINTMENT (OUTPATIENT)
Dept: RADIOLOGY | Facility: MEDICAL CENTER | Age: 63
End: 2023-09-09
Attending: STUDENT IN AN ORGANIZED HEALTH CARE EDUCATION/TRAINING PROGRAM
Payer: MEDICARE

## 2023-09-09 VITALS
WEIGHT: 165 LBS | HEIGHT: 72 IN | SYSTOLIC BLOOD PRESSURE: 130 MMHG | OXYGEN SATURATION: 93 % | HEART RATE: 73 BPM | RESPIRATION RATE: 18 BRPM | TEMPERATURE: 97 F | BODY MASS INDEX: 22.35 KG/M2 | DIASTOLIC BLOOD PRESSURE: 80 MMHG

## 2023-09-09 DIAGNOSIS — L03.116 CELLULITIS OF LEFT LOWER EXTREMITY: ICD-10-CM

## 2023-09-09 DIAGNOSIS — M79.604 BILATERAL LOWER EXTREMITY PAIN: ICD-10-CM

## 2023-09-09 DIAGNOSIS — L03.115 CELLULITIS OF RIGHT LOWER EXTREMITY: ICD-10-CM

## 2023-09-09 DIAGNOSIS — M79.605 BILATERAL LOWER EXTREMITY PAIN: ICD-10-CM

## 2023-09-09 LAB
ALBUMIN SERPL BCP-MCNC: 3.3 G/DL (ref 3.2–4.9)
ALBUMIN/GLOB SERPL: 1.1 G/DL
ALP SERPL-CCNC: 94 U/L (ref 30–99)
ALT SERPL-CCNC: 32 U/L (ref 2–50)
ANION GAP SERPL CALC-SCNC: 7 MMOL/L (ref 7–16)
AST SERPL-CCNC: 42 U/L (ref 12–45)
BASOPHILS # BLD AUTO: 1.6 % (ref 0–1.8)
BASOPHILS # BLD: 0.05 K/UL (ref 0–0.12)
BILIRUB SERPL-MCNC: 0.5 MG/DL (ref 0.1–1.5)
BUN SERPL-MCNC: 6 MG/DL (ref 8–22)
CALCIUM ALBUM COR SERPL-MCNC: 8.7 MG/DL (ref 8.5–10.5)
CALCIUM SERPL-MCNC: 8.1 MG/DL (ref 8.5–10.5)
CHLORIDE SERPL-SCNC: 103 MMOL/L (ref 96–112)
CO2 SERPL-SCNC: 25 MMOL/L (ref 20–33)
CREAT SERPL-MCNC: 0.72 MG/DL (ref 0.5–1.4)
EOSINOPHIL # BLD AUTO: 0.13 K/UL (ref 0–0.51)
EOSINOPHIL NFR BLD: 4.1 % (ref 0–6.9)
ERYTHROCYTE [DISTWIDTH] IN BLOOD BY AUTOMATED COUNT: 48.8 FL (ref 35.9–50)
GFR SERPLBLD CREATININE-BSD FMLA CKD-EPI: 102 ML/MIN/1.73 M 2
GLOBULIN SER CALC-MCNC: 3 G/DL (ref 1.9–3.5)
GLUCOSE SERPL-MCNC: 107 MG/DL (ref 65–99)
HCT VFR BLD AUTO: 29.8 % (ref 42–52)
HGB BLD-MCNC: 9.1 G/DL (ref 14–18)
IMM GRANULOCYTES # BLD AUTO: 0.01 K/UL (ref 0–0.11)
IMM GRANULOCYTES NFR BLD AUTO: 0.3 % (ref 0–0.9)
LACTATE SERPL-SCNC: 1.9 MMOL/L (ref 0.5–2)
LYMPHOCYTES # BLD AUTO: 0.58 K/UL (ref 1–4.8)
LYMPHOCYTES NFR BLD: 18.3 % (ref 22–41)
MCH RBC QN AUTO: 26.9 PG (ref 27–33)
MCHC RBC AUTO-ENTMCNC: 30.5 G/DL (ref 32.3–36.5)
MCV RBC AUTO: 88.2 FL (ref 81.4–97.8)
MONOCYTES # BLD AUTO: 0.38 K/UL (ref 0–0.85)
MONOCYTES NFR BLD AUTO: 12 % (ref 0–13.4)
NEUTROPHILS # BLD AUTO: 2.02 K/UL (ref 1.82–7.42)
NEUTROPHILS NFR BLD: 63.7 % (ref 44–72)
NRBC # BLD AUTO: 0 K/UL
NRBC BLD-RTO: 0 /100 WBC (ref 0–0.2)
PLATELET # BLD AUTO: 80 K/UL (ref 164–446)
PLATELETS.RETICULATED NFR BLD AUTO: 12.9 % (ref 0.6–13.1)
PMV BLD AUTO: 11.7 FL (ref 9–12.9)
POTASSIUM SERPL-SCNC: 4 MMOL/L (ref 3.6–5.5)
PROT SERPL-MCNC: 6.3 G/DL (ref 6–8.2)
RBC # BLD AUTO: 3.38 M/UL (ref 4.7–6.1)
SODIUM SERPL-SCNC: 135 MMOL/L (ref 135–145)
WBC # BLD AUTO: 3.2 K/UL (ref 4.8–10.8)

## 2023-09-09 PROCEDURE — 80053 COMPREHEN METABOLIC PANEL: CPT

## 2023-09-09 PROCEDURE — A9270 NON-COVERED ITEM OR SERVICE: HCPCS | Performed by: STUDENT IN AN ORGANIZED HEALTH CARE EDUCATION/TRAINING PROGRAM

## 2023-09-09 PROCEDURE — 93970 EXTREMITY STUDY: CPT

## 2023-09-09 PROCEDURE — 700111 HCHG RX REV CODE 636 W/ 250 OVERRIDE (IP): Performed by: STUDENT IN AN ORGANIZED HEALTH CARE EDUCATION/TRAINING PROGRAM

## 2023-09-09 PROCEDURE — 93970 EXTREMITY STUDY: CPT | Mod: 26 | Performed by: INTERNAL MEDICINE

## 2023-09-09 PROCEDURE — 83605 ASSAY OF LACTIC ACID: CPT

## 2023-09-09 PROCEDURE — 96374 THER/PROPH/DIAG INJ IV PUSH: CPT

## 2023-09-09 PROCEDURE — 85025 COMPLETE CBC W/AUTO DIFF WBC: CPT

## 2023-09-09 PROCEDURE — 700102 HCHG RX REV CODE 250 W/ 637 OVERRIDE(OP): Performed by: STUDENT IN AN ORGANIZED HEALTH CARE EDUCATION/TRAINING PROGRAM

## 2023-09-09 PROCEDURE — 99284 EMERGENCY DEPT VISIT MOD MDM: CPT

## 2023-09-09 PROCEDURE — 36415 COLL VENOUS BLD VENIPUNCTURE: CPT

## 2023-09-09 PROCEDURE — A6403 STERILE GAUZE>16 <= 48 SQ IN: HCPCS

## 2023-09-09 PROCEDURE — 87040 BLOOD CULTURE FOR BACTERIA: CPT | Mod: 91

## 2023-09-09 PROCEDURE — 85055 RETICULATED PLATELET ASSAY: CPT

## 2023-09-09 RX ORDER — CEPHALEXIN 500 MG/1
500 CAPSULE ORAL ONCE
Status: COMPLETED | OUTPATIENT
Start: 2023-09-09 | End: 2023-09-09

## 2023-09-09 RX ORDER — HYDROMORPHONE HYDROCHLORIDE 1 MG/ML
1 INJECTION, SOLUTION INTRAMUSCULAR; INTRAVENOUS; SUBCUTANEOUS ONCE
Status: COMPLETED | OUTPATIENT
Start: 2023-09-09 | End: 2023-09-09

## 2023-09-09 RX ORDER — CEPHALEXIN 500 MG/1
500 CAPSULE ORAL 4 TIMES DAILY
Qty: 28 CAPSULE | Refills: 0 | Status: ACTIVE | OUTPATIENT
Start: 2023-09-09 | End: 2023-09-16

## 2023-09-09 RX ADMIN — HYDROMORPHONE HYDROCHLORIDE 1 MG: 1 INJECTION, SOLUTION INTRAMUSCULAR; INTRAVENOUS; SUBCUTANEOUS at 22:14

## 2023-09-09 RX ADMIN — CEPHALEXIN 500 MG: 500 CAPSULE ORAL at 22:14

## 2023-09-09 ASSESSMENT — FIBROSIS 4 INDEX: FIB4 SCORE: 4.06

## 2023-09-10 NOTE — ED PROVIDER NOTES
ED Provider Note    CHIEF COMPLAINT  Chief Complaint   Patient presents with    Wound Infection     Pt reports BLE swelling. Right lower extremity wound from open blister, noted clear discharge and redness noted to site. Pt states blister and wound to right lower extremity appeared x 4-5 days. Pain to BLE reported rate 8/10 aching.        EXTERNAL RECORDS REVIEWED  Other Patient was discharged from the hospital on 23 August.  He was found to have new onset cirrhosis with a 4.6 cm liver mass concerning for HCC.    HPI/ROS  LIMITATION TO HISTORY   Select: : None  OUTSIDE HISTORIAN(S):  None    Tyrone Giraldo is a 63 y.o. male who presents with bilateral lower extremity pain and swelling.  Patient says its been worsening over the past 4 to 5 days.  Pain is about 8 out of 10 in severity and aching in nature.  He has not been taking any medications for the pain at home.  He denies any fevers, chills, nausea or vomiting.  He was recently hospitalized for GI bleed and cirrhosis, work-up concerning for liver cancer.  He denies any history of blood clot.  Other than his recent hospitalization he denies any period of immobilization.  He denies any trauma, fall or injury.  No numbness or tingling.    PAST MEDICAL HISTORY   has a past medical history of CAD (coronary artery disease), Esophageal varices with bleeding (HCC), MI (myocardial infarction) (HCC), and Sciatica.    SURGICAL HISTORY   has a past surgical history that includes other cardiac surgery; vasectomy; upper gi endoscopy,diagnosis (N/A, 8/17/2023); and upper gi endoscopy,ligat varix (N/A, 8/17/2023).    FAMILY HISTORY  History reviewed. No pertinent family history.    SOCIAL HISTORY  Social History     Tobacco Use    Smoking status: Some Days     Types: Cigarettes    Smokeless tobacco: Never    Tobacco comments:     10-12 cigs/day   Vaping Use    Vaping Use: Never used   Substance and Sexual Activity    Alcohol use: Yes     Comment: rare    Drug use: Yes      Types: Inhaled     Comment: marijuana      Sexual activity: Not on file       CURRENT MEDICATIONS  Home Medications       Reviewed by Mady Ward R.N. (Registered Nurse) on 09/09/23 at 2235  Med List Status: Not Addressed     Medication Last Dose Status   aspirin (ASA) 81 MG Chew Tab chewable tablet  Active   carvedilol (COREG) 3.125 MG Tab  Active   omeprazole (PRILOSEC) 20 MG delayed-release capsule  Active                    ALLERGIES  Allergies   Allergen Reactions    Cortisone Rash    Morphine        PHYSICAL EXAM  VITAL SIGNS: /80   Pulse 73   Temp 36.1 °C (97 °F)   Resp 18   Ht 1.829 m (6')   Wt 74.8 kg (165 lb)   SpO2 93%   BMI 22.38 kg/m²    Constitutional: Awake and alert. Nontoxic  HENT:  Grossly normal  Eyes: Grossly normal  Neck: Normal range of motion  Cardiovascular: Normal heart rate   Thorax & Lungs: No respiratory distress  Abdomen: Nontender  Skin:  No pathologic rash.   Extremities: He has bilateral lower extremity swelling with diffuse tenderness to palpation, erythema and warmth.  Right lower extremity with two open wounds/blisters (one anterior, one medial lower leg) with surrounding erythema, no purulent drainage.  No fluctuance.  No crepitus.   Psychiatric: Affect normal      DIAGNOSTIC STUDIES / PROCEDURES      LABS  Results for orders placed or performed during the hospital encounter of 09/09/23   CBC WITH DIFFERENTIAL   Result Value Ref Range    WBC 3.2 (L) 4.8 - 10.8 K/uL    RBC 3.38 (L) 4.70 - 6.10 M/uL    Hemoglobin 9.1 (L) 14.0 - 18.0 g/dL    Hematocrit 29.8 (L) 42.0 - 52.0 %    MCV 88.2 81.4 - 97.8 fL    MCH 26.9 (L) 27.0 - 33.0 pg    MCHC 30.5 (L) 32.3 - 36.5 g/dL    RDW 48.8 35.9 - 50.0 fL    Platelet Count 80 (L) 164 - 446 K/uL    MPV 11.7 9.0 - 12.9 fL    Neutrophils-Polys 63.70 44.00 - 72.00 %    Lymphocytes 18.30 (L) 22.00 - 41.00 %    Monocytes 12.00 0.00 - 13.40 %    Eosinophils 4.10 0.00 - 6.90 %    Basophils 1.60 0.00 - 1.80 %    Immature Granulocytes 0.30  0.00 - 0.90 %    Nucleated RBC 0.00 0.00 - 0.20 /100 WBC    Neutrophils (Absolute) 2.02 1.82 - 7.42 K/uL    Lymphs (Absolute) 0.58 (L) 1.00 - 4.80 K/uL    Monos (Absolute) 0.38 0.00 - 0.85 K/uL    Eos (Absolute) 0.13 0.00 - 0.51 K/uL    Baso (Absolute) 0.05 0.00 - 0.12 K/uL    Immature Granulocytes (abs) 0.01 0.00 - 0.11 K/uL    NRBC (Absolute) 0.00 K/uL   COMP METABOLIC PANEL   Result Value Ref Range    Sodium 135 135 - 145 mmol/L    Potassium 4.0 3.6 - 5.5 mmol/L    Chloride 103 96 - 112 mmol/L    Co2 25 20 - 33 mmol/L    Anion Gap 7.0 7.0 - 16.0    Glucose 107 (H) 65 - 99 mg/dL    Bun 6 (L) 8 - 22 mg/dL    Creatinine 0.72 0.50 - 1.40 mg/dL    Calcium 8.1 (L) 8.5 - 10.5 mg/dL    Correct Calcium 8.7 8.5 - 10.5 mg/dL    AST(SGOT) 42 12 - 45 U/L    ALT(SGPT) 32 2 - 50 U/L    Alkaline Phosphatase 94 30 - 99 U/L    Total Bilirubin 0.5 0.1 - 1.5 mg/dL    Albumin 3.3 3.2 - 4.9 g/dL    Total Protein 6.3 6.0 - 8.2 g/dL    Globulin 3.0 1.9 - 3.5 g/dL    A-G Ratio 1.1 g/dL   LACTIC ACID   Result Value Ref Range    Lactic Acid 1.9 0.5 - 2.0 mmol/L   IMMATURE PLT FRACTION   Result Value Ref Range    Imm. Plt Fraction 12.9 0.6 - 13.1 %   ESTIMATED GFR   Result Value Ref Range    GFR (CKD-EPI) 102 >60 mL/min/1.73 m 2         RADIOLOGY  I have independently interpreted the diagnostic imaging associated with this visit and am waiting the final reading from the radiologist.   My preliminary interpretation is as follows: No obvious thrombus  Radiologist interpretation:   US-EXTREMITY VENOUS LOWER BILAT   Final Result            COURSE & MEDICAL DECISION MAKING    ED Observation Status? Yes; I am placing the patient in to an observation status due to a diagnostic uncertainty as well as therapeutic intensity. Patient placed in observation status at 8:55 PM, 9/9/2023.     Observation plan is as follows: IV, Labs, US, Serial Exams    Upon Reevaluation, the patient's condition has: Improved; and will be discharged.    Patient discharged  from ED Observation status at 22.00 PM 9/10/2023    INITIAL ASSESSMENT, COURSE AND PLAN  Care Narrative: This is a 63-year-old male with recent diagnosis of liver mass and cirrhosis who presents with bilateral lower extremity pain and swelling.  He arrives afebrile with normal vital signs and is not systemically ill appearing.  He has bilateral lower extremity swelling on exam with associated erythema, tenderness and warmth consistent with cellulitis.  He is nontoxic, symptoms are not rapidly progressive and no crepitus and not consistent with a necrotizing soft tissue infection.  Duplex was obtained without any evidence of DVT.  His compartments are soft and he denies any trauma and not consistent with compartment syndrome or bony injury.  Wounds do not probe to bone and doubt osteomyelitis.  Labs notable for leukopenia, normal lactate, normal renal function.  No other significant derangements. He is not septic and does not meet criteria for inpatient admission or IV antibiotics.  Blood cultures were obtained in abundance of caution.  He was given his first dose of Keflex here in the emergency department. A clean dressing was applied and he was instructed on wound care at home.  He was advised to follow-up closely with his outpatient doctors as scheduled and advised to have his white blood cell count rechecked and he expresses understanding.  He will return to the ER sooner for any progression of the redness, swelling, worsening pain, fevers or any other new or acute concern.      DISPOSITION AND DISCUSSIONS  I have discussed management of the patient with the following physicians and MANDA's:  None    Discussion of management with other QHP or appropriate source(s): None     Escalation of care considered, and ultimately not performed:acute inpatient care management, however at this time, the patient is most appropriate for outpatient management    Barriers to care at this time, including but not limited to:  None .      Decision tools and prescription drugs considered including, but not limited to: Antibiotics Keflex prescribed, per our antibiotic stewardship policy this is a nonpurulent cellulitis and can be treated with monotherapy .    FINAL DIAGNOSIS  1. Cellulitis of right lower extremity Acute   2. Cellulitis of left lower extremity Acute   3. Bilateral lower extremity pain Acute          Electronically signed by: Miryam Flores M.D., 9/9/2023 8:53 PM

## 2023-09-10 NOTE — DISCHARGE INSTRUCTIONS
Please return to the ER if you have persistent or worsening pain, spreading of the redness, nausea, vomiting fever, purulent drainage from the wounds or any other new or acute concern.

## 2023-09-10 NOTE — ED NOTES
Break RN: Discharge teaching with paperwork provided to pt. Pt verbalized understanding of teaching and all questions answered. Pt is A&Ox4 with stable vital signs, stable physical assessment, and PIV removed with catheter intact upon discharge. Pt ambulatory out of ED with steady gait with all personal belongings.

## 2023-09-10 NOTE — ED TRIAGE NOTES
Chief Complaint   Patient presents with    Wound Infection     Pt reports BLE swelling. Right lower extremity wound from open blister, noted clear discharge and redness noted to site. Pt states blister and wound to right lower extremity appeared x 4-5 days. Pain to BLE reported rate 8/10 aching.        62 y/o male ambulatory w/ cane to triage for above complaint. Pt aaox4.      Pt place in waiting area. Educated on triage process. Pt will inform staff of any medical changes.    /52   Pulse 92   Temp 36.7 °C (98.1 °F) (Temporal)   Resp 20   Ht 1.829 m (6')   Wt 74.8 kg (165 lb)   SpO2 97%   BMI 22.38 kg/m²

## 2023-09-10 NOTE — ED NOTES
PT ambulated to room with steady gait utilizing cane. PT changed into gown and placed on monitor. Call light within reach. Chart up for ERP.

## 2023-09-14 LAB
BACTERIA BLD CULT: NORMAL
BACTERIA BLD CULT: NORMAL
SIGNIFICANT IND 70042: NORMAL
SIGNIFICANT IND 70042: NORMAL
SITE SITE: NORMAL
SITE SITE: NORMAL
SOURCE SOURCE: NORMAL
SOURCE SOURCE: NORMAL

## 2023-09-19 ENCOUNTER — OFFICE VISIT (OUTPATIENT)
Dept: SURGICAL ONCOLOGY | Facility: MEDICAL CENTER | Age: 63
End: 2023-09-19
Payer: MEDICARE

## 2023-09-19 VITALS
SYSTOLIC BLOOD PRESSURE: 130 MMHG | HEIGHT: 72 IN | WEIGHT: 171 LBS | OXYGEN SATURATION: 98 % | HEART RATE: 94 BPM | BODY MASS INDEX: 23.16 KG/M2 | TEMPERATURE: 98.6 F | DIASTOLIC BLOOD PRESSURE: 80 MMHG

## 2023-09-19 DIAGNOSIS — K92.1 MELENA: ICD-10-CM

## 2023-09-19 DIAGNOSIS — I85.00 ESOPHAGEAL VARICES WITHOUT BLEEDING, UNSPECIFIED ESOPHAGEAL VARICES TYPE (HCC): ICD-10-CM

## 2023-09-19 DIAGNOSIS — R11.0 NAUSEA: ICD-10-CM

## 2023-09-19 DIAGNOSIS — K70.30 ALCOHOLIC CIRRHOSIS, UNSPECIFIED WHETHER ASCITES PRESENT (HCC): ICD-10-CM

## 2023-09-19 DIAGNOSIS — I25.2 HISTORY OF MI (MYOCARDIAL INFARCTION): ICD-10-CM

## 2023-09-19 DIAGNOSIS — C22.0 HEPATOCELLULAR CARCINOMA (HCC): ICD-10-CM

## 2023-09-19 DIAGNOSIS — I25.10 CORONARY ARTERY DISEASE INVOLVING NATIVE CORONARY ARTERY OF NATIVE HEART WITHOUT ANGINA PECTORIS: ICD-10-CM

## 2023-09-19 PROCEDURE — 3075F SYST BP GE 130 - 139MM HG: CPT | Performed by: SURGERY

## 2023-09-19 PROCEDURE — 3079F DIAST BP 80-89 MM HG: CPT | Performed by: SURGERY

## 2023-09-19 PROCEDURE — 99205 OFFICE O/P NEW HI 60 MIN: CPT | Performed by: SURGERY

## 2023-09-19 ASSESSMENT — FIBROSIS 4 INDEX: FIB4 SCORE: 5.94

## 2023-09-19 NOTE — PATIENT INSTRUCTIONS
The patient has been given my card and asked to call me after he settles his housing issue.  It would be near impossible to proceed with surgery without a place to land after surgery.

## 2023-09-28 ENCOUNTER — PATIENT OUTREACH (OUTPATIENT)
Dept: ONCOLOGY | Facility: MEDICAL CENTER | Age: 63
End: 2023-09-28
Payer: MEDICARE

## 2023-09-28 NOTE — PROGRESS NOTES
10/11/21 1015: 4 month post BBP exposure lab results are completed as of 10/6/2021.    Writer will route to Dr. Genao to review and writer can then contact patient with results. Writer would also ask that Dr. Genao input the medical opinion letter for the PSR to send to patient's employer.      Oncology nurse navigator called patient to follow up on a high distress score of 8 related to homelessness.  SAIBNO called to introduce self my role and services and the entire support staff.  Patient reports that he is disabled and he has an income from SSI of $1099/month.  He states that he was staying with a friend in an apartment and that she was evicted so he had to move out.  He states that currently he is sleeping on the couch of his friends apartment.  SABINO explained that I would speak to the  and call the patient back.  SABINO spoke to OSW Maricruz Paula and called the patient back.  SABINO left a voice mail letting the patient know that the [facility] waiting list is currently closed at this time.  SABINO also said that if he needed the shelter information to please call me back and I would provide that information.

## 2023-10-24 NOTE — DIETARY
Franko Cerda MD   Back to Top      Would stop checking MPA levels. He has had so many infections and has neutropenia. I don't plan to increase dose from 540mg bid.   Updated orders     Spoke to Chip confirmed stopped Valcyte     ----- Message -----   From: Franko Cerda MD   Sent: 9/26/2023   9:05 PM CDT   To: Veronica Edgar RN   Subject: RE: ID    Dr Parker states can stop  his Valcyt*     In 1 week if CMV still negative would decrease valcyte to 900mg daily. Continue that for 3 weeks. If weekly CMV remains neg or <35 would trial stopping. If not done already please obtain CMV IgG.   ----- Message -----   From: Veronica Edgar RN   Sent: 9/26/2023   6:35 PM CDT   To: Franko Cerda MD   Subject: ID   Keith states can stop  his Valcyte-- *               Nutrition Services: Diabetes Education Consult   Day 6 of admit.  Tyrone Giraldo is a 63 y.o. male with admitting DX of GI bleed [K92.2]    RD able to visit pt at bedside to provide nutrition education. Pt with HbA1c of 6.5 with no history of DM dx. RD discussed balanced plate method, and general healthy diet recommendations. RD provided handouts reinforcing topics discussed. Pt expressed possible food insecurity, RD provided food assistance resources. Pt demonstrated evidence of learning. RD able to answer all questions to patient's satisfaction.     No other education needs identified at this time. Consider referral to outpatient nutrition services for continuation of education as indicated or per pt preferences.     Please re-consult RD as indicated.

## 2023-11-05 ENCOUNTER — HOSPITAL ENCOUNTER (INPATIENT)
Facility: MEDICAL CENTER | Age: 63
LOS: 4 days | DRG: 812 | End: 2023-11-09
Attending: STUDENT IN AN ORGANIZED HEALTH CARE EDUCATION/TRAINING PROGRAM | Admitting: STUDENT IN AN ORGANIZED HEALTH CARE EDUCATION/TRAINING PROGRAM
Payer: MEDICARE

## 2023-11-05 ENCOUNTER — APPOINTMENT (OUTPATIENT)
Dept: RADIOLOGY | Facility: MEDICAL CENTER | Age: 63
DRG: 812 | End: 2023-11-05
Attending: STUDENT IN AN ORGANIZED HEALTH CARE EDUCATION/TRAINING PROGRAM
Payer: MEDICARE

## 2023-11-05 DIAGNOSIS — Z85.9 HISTORY OF CANCER: ICD-10-CM

## 2023-11-05 DIAGNOSIS — I85.11 SECONDARY ESOPHAGEAL VARICES WITH BLEEDING (HCC): ICD-10-CM

## 2023-11-05 DIAGNOSIS — R10.30 LOWER ABDOMINAL PAIN: ICD-10-CM

## 2023-11-05 DIAGNOSIS — K59.01 SLOW TRANSIT CONSTIPATION: ICD-10-CM

## 2023-11-05 DIAGNOSIS — Z72.0 TOBACCO ABUSE: ICD-10-CM

## 2023-11-05 DIAGNOSIS — D64.9 ANEMIA, UNSPECIFIED TYPE: ICD-10-CM

## 2023-11-05 DIAGNOSIS — E87.6 HYPOKALEMIA: ICD-10-CM

## 2023-11-05 PROBLEM — K59.00 CONSTIPATION: Status: ACTIVE | Noted: 2023-11-05

## 2023-11-05 PROBLEM — K74.69 OTHER CIRRHOSIS OF LIVER (HCC): Status: ACTIVE | Noted: 2023-08-22

## 2023-11-05 PROBLEM — Z59.00 HOMELESSNESS: Status: ACTIVE | Noted: 2023-11-05

## 2023-11-05 LAB
ABO GROUP BLD: ABNORMAL
ALBUMIN SERPL BCP-MCNC: 3.1 G/DL (ref 3.2–4.9)
ALBUMIN/GLOB SERPL: 0.9 G/DL
ALP SERPL-CCNC: 89 U/L (ref 30–99)
ALT SERPL-CCNC: 60 U/L (ref 2–50)
ANION GAP SERPL CALC-SCNC: 10 MMOL/L (ref 7–16)
ANION GAP SERPL CALC-SCNC: 9 MMOL/L (ref 7–16)
ANISOCYTOSIS BLD QL SMEAR: ABNORMAL
AST SERPL-CCNC: 28 U/L (ref 12–45)
BARCODED ABORH UBTYP: 5100
BARCODED ABORH UBTYP: 6200
BARCODED PRD CODE UBPRD: ABNORMAL
BARCODED PRD CODE UBPRD: ABNORMAL
BARCODED UNIT NUM UBUNT: ABNORMAL
BARCODED UNIT NUM UBUNT: ABNORMAL
BASOPHILS # BLD AUTO: 1.7 % (ref 0–1.8)
BASOPHILS # BLD: 0.12 K/UL (ref 0–0.12)
BILIRUB SERPL-MCNC: 0.6 MG/DL (ref 0.1–1.5)
BLD GP AB INVEST PLASRBC-IMP: ABNORMAL
BLD GP AB SCN SERPL QL: ABNORMAL
BUN SERPL-MCNC: 8 MG/DL (ref 8–22)
BUN SERPL-MCNC: 9 MG/DL (ref 8–22)
CALCIUM ALBUM COR SERPL-MCNC: 8.4 MG/DL (ref 8.5–10.5)
CALCIUM SERPL-MCNC: 7.6 MG/DL (ref 8.5–10.5)
CALCIUM SERPL-MCNC: 7.7 MG/DL (ref 8.5–10.5)
CHLORIDE SERPL-SCNC: 97 MMOL/L (ref 96–112)
CHLORIDE SERPL-SCNC: 99 MMOL/L (ref 96–112)
CO2 SERPL-SCNC: 23 MMOL/L (ref 20–33)
CO2 SERPL-SCNC: 24 MMOL/L (ref 20–33)
COMPONENT R 8504R: ABNORMAL
COMPONENT R 8504R: ABNORMAL
CREAT SERPL-MCNC: 0.78 MG/DL (ref 0.5–1.4)
CREAT SERPL-MCNC: 0.78 MG/DL (ref 0.5–1.4)
EOSINOPHIL # BLD AUTO: 0.19 K/UL (ref 0–0.51)
EOSINOPHIL NFR BLD: 2.6 % (ref 0–6.9)
ERYTHROCYTE [DISTWIDTH] IN BLOOD BY AUTOMATED COUNT: 52 FL (ref 35.9–50)
GFR SERPLBLD CREATININE-BSD FMLA CKD-EPI: 99 ML/MIN/1.73 M 2
GFR SERPLBLD CREATININE-BSD FMLA CKD-EPI: 99 ML/MIN/1.73 M 2
GLOBULIN SER CALC-MCNC: 3.4 G/DL (ref 1.9–3.5)
GLUCOSE SERPL-MCNC: 142 MG/DL (ref 65–99)
GLUCOSE SERPL-MCNC: 160 MG/DL (ref 65–99)
HCT VFR BLD AUTO: 18.9 % (ref 42–52)
HCT VFR BLD AUTO: 20.4 % (ref 42–52)
HCT VFR BLD AUTO: 20.8 % (ref 42–52)
HCT VFR BLD AUTO: 22.9 % (ref 42–52)
HGB BLD-MCNC: 5.4 G/DL (ref 14–18)
HGB BLD-MCNC: 5.9 G/DL (ref 14–18)
HGB BLD-MCNC: 6.4 G/DL (ref 14–18)
HGB BLD-MCNC: 7.1 G/DL (ref 14–18)
HYPOCHROMIA BLD QL SMEAR: ABNORMAL
INR PPP: 1.14 (ref 0.87–1.13)
LIPASE SERPL-CCNC: 35 U/L (ref 11–82)
LYMPHOCYTES # BLD AUTO: 1.01 K/UL (ref 1–4.8)
LYMPHOCYTES NFR BLD: 13.8 % (ref 22–41)
MACROCYTES BLD QL SMEAR: ABNORMAL
MAGNESIUM SERPL-MCNC: 1.9 MG/DL (ref 1.5–2.5)
MANUAL DIFF BLD: NORMAL
MCH RBC QN AUTO: 22.7 PG (ref 27–33)
MCHC RBC AUTO-ENTMCNC: 28.9 G/DL (ref 32.3–36.5)
MCV RBC AUTO: 78.5 FL (ref 81.4–97.8)
MONOCYTES # BLD AUTO: 0.26 K/UL (ref 0–0.85)
MONOCYTES NFR BLD AUTO: 3.5 % (ref 0–13.4)
MORPHOLOGY BLD-IMP: NORMAL
NEUTROPHILS # BLD AUTO: 5.72 K/UL (ref 1.82–7.42)
NEUTROPHILS NFR BLD: 78.4 % (ref 44–72)
NRBC # BLD AUTO: 0.02 K/UL
NRBC BLD-RTO: 0.3 /100 WBC (ref 0–0.2)
OVALOCYTES BLD QL SMEAR: NORMAL
PHOSPHATE SERPL-MCNC: 1.8 MG/DL (ref 2.5–4.5)
PLATELET # BLD AUTO: 128 K/UL (ref 164–446)
PLATELET BLD QL SMEAR: NORMAL
PMV BLD AUTO: 12.8 FL (ref 9–12.9)
POIKILOCYTOSIS BLD QL SMEAR: NORMAL
POTASSIUM SERPL-SCNC: 3 MMOL/L (ref 3.6–5.5)
POTASSIUM SERPL-SCNC: 3.7 MMOL/L (ref 3.6–5.5)
PRODUCT TYPE UPROD: ABNORMAL
PRODUCT TYPE UPROD: ABNORMAL
PROT SERPL-MCNC: 6.5 G/DL (ref 6–8.2)
PROTHROMBIN TIME: 14.8 SEC (ref 12–14.6)
RBC # BLD AUTO: 2.6 M/UL (ref 4.7–6.1)
RBC BLD AUTO: PRESENT
RH BLD: ABNORMAL
SODIUM SERPL-SCNC: 131 MMOL/L (ref 135–145)
SODIUM SERPL-SCNC: 131 MMOL/L (ref 135–145)
UNIT STATUS USTAT: ABNORMAL
UNIT STATUS USTAT: ABNORMAL
WBC # BLD AUTO: 7.3 K/UL (ref 4.8–10.8)

## 2023-11-05 PROCEDURE — 85027 COMPLETE CBC AUTOMATED: CPT

## 2023-11-05 PROCEDURE — 84100 ASSAY OF PHOSPHORUS: CPT

## 2023-11-05 PROCEDURE — 86905 BLOOD TYPING RBC ANTIGENS: CPT

## 2023-11-05 PROCEDURE — 99406 BEHAV CHNG SMOKING 3-10 MIN: CPT

## 2023-11-05 PROCEDURE — 80053 COMPREHEN METABOLIC PANEL: CPT

## 2023-11-05 PROCEDURE — 74177 CT ABD & PELVIS W/CONTRAST: CPT

## 2023-11-05 PROCEDURE — 36415 COLL VENOUS BLD VENIPUNCTURE: CPT

## 2023-11-05 PROCEDURE — 36430 TRANSFUSION BLD/BLD COMPNT: CPT

## 2023-11-05 PROCEDURE — 96375 TX/PRO/DX INJ NEW DRUG ADDON: CPT

## 2023-11-05 PROCEDURE — P9016 RBC LEUKOCYTES REDUCED: HCPCS

## 2023-11-05 PROCEDURE — 86850 RBC ANTIBODY SCREEN: CPT

## 2023-11-05 PROCEDURE — 99291 CRITICAL CARE FIRST HOUR: CPT | Mod: 25,AI | Performed by: STUDENT IN AN ORGANIZED HEALTH CARE EDUCATION/TRAINING PROGRAM

## 2023-11-05 PROCEDURE — 99285 EMERGENCY DEPT VISIT HI MDM: CPT

## 2023-11-05 PROCEDURE — 85014 HEMATOCRIT: CPT

## 2023-11-05 PROCEDURE — 770020 HCHG ROOM/CARE - TELE (206)

## 2023-11-05 PROCEDURE — 85007 BL SMEAR W/DIFF WBC COUNT: CPT

## 2023-11-05 PROCEDURE — C9113 INJ PANTOPRAZOLE SODIUM, VIA: HCPCS | Performed by: STUDENT IN AN ORGANIZED HEALTH CARE EDUCATION/TRAINING PROGRAM

## 2023-11-05 PROCEDURE — 700117 HCHG RX CONTRAST REV CODE 255: Performed by: STUDENT IN AN ORGANIZED HEALTH CARE EDUCATION/TRAINING PROGRAM

## 2023-11-05 PROCEDURE — 83735 ASSAY OF MAGNESIUM: CPT

## 2023-11-05 PROCEDURE — 86870 RBC ANTIBODY IDENTIFICATION: CPT

## 2023-11-05 PROCEDURE — A9270 NON-COVERED ITEM OR SERVICE: HCPCS | Performed by: STUDENT IN AN ORGANIZED HEALTH CARE EDUCATION/TRAINING PROGRAM

## 2023-11-05 PROCEDURE — 700105 HCHG RX REV CODE 258: Performed by: STUDENT IN AN ORGANIZED HEALTH CARE EDUCATION/TRAINING PROGRAM

## 2023-11-05 PROCEDURE — 96366 THER/PROPH/DIAG IV INF ADDON: CPT

## 2023-11-05 PROCEDURE — 99222 1ST HOSP IP/OBS MODERATE 55: CPT | Performed by: INTERNAL MEDICINE

## 2023-11-05 PROCEDURE — 96368 THER/DIAG CONCURRENT INF: CPT

## 2023-11-05 PROCEDURE — 86900 BLOOD TYPING SEROLOGIC ABO: CPT

## 2023-11-05 PROCEDURE — 85018 HEMOGLOBIN: CPT

## 2023-11-05 PROCEDURE — 86922 COMPATIBILITY TEST ANTIGLOB: CPT | Mod: 91

## 2023-11-05 PROCEDURE — 700102 HCHG RX REV CODE 250 W/ 637 OVERRIDE(OP): Performed by: STUDENT IN AN ORGANIZED HEALTH CARE EDUCATION/TRAINING PROGRAM

## 2023-11-05 PROCEDURE — 80048 BASIC METABOLIC PNL TOTAL CA: CPT

## 2023-11-05 PROCEDURE — 700111 HCHG RX REV CODE 636 W/ 250 OVERRIDE (IP): Performed by: STUDENT IN AN ORGANIZED HEALTH CARE EDUCATION/TRAINING PROGRAM

## 2023-11-05 PROCEDURE — 83690 ASSAY OF LIPASE: CPT

## 2023-11-05 PROCEDURE — 96365 THER/PROPH/DIAG IV INF INIT: CPT

## 2023-11-05 PROCEDURE — 85610 PROTHROMBIN TIME: CPT

## 2023-11-05 PROCEDURE — 86901 BLOOD TYPING SEROLOGIC RH(D): CPT

## 2023-11-05 PROCEDURE — 99406 BEHAV CHNG SMOKING 3-10 MIN: CPT | Performed by: STUDENT IN AN ORGANIZED HEALTH CARE EDUCATION/TRAINING PROGRAM

## 2023-11-05 RX ORDER — AMOXICILLIN 250 MG
2 CAPSULE ORAL 2 TIMES DAILY
Status: DISCONTINUED | OUTPATIENT
Start: 2023-11-05 | End: 2023-11-09 | Stop reason: HOSPADM

## 2023-11-05 RX ORDER — POTASSIUM CHLORIDE 20 MEQ/1
20 TABLET, EXTENDED RELEASE ORAL ONCE
Status: COMPLETED | OUTPATIENT
Start: 2023-11-05 | End: 2023-11-05

## 2023-11-05 RX ORDER — HYDROMORPHONE HYDROCHLORIDE 1 MG/ML
0.5 INJECTION, SOLUTION INTRAMUSCULAR; INTRAVENOUS; SUBCUTANEOUS ONCE
Status: COMPLETED | OUTPATIENT
Start: 2023-11-05 | End: 2023-11-05

## 2023-11-05 RX ORDER — POLYETHYLENE GLYCOL 3350 17 G/17G
1 POWDER, FOR SOLUTION ORAL
Status: DISCONTINUED | OUTPATIENT
Start: 2023-11-05 | End: 2023-11-09 | Stop reason: HOSPADM

## 2023-11-05 RX ORDER — POTASSIUM CHLORIDE 14.9 MG/ML
20 INJECTION INTRAVENOUS ONCE
Status: DISCONTINUED | OUTPATIENT
Start: 2023-11-05 | End: 2023-11-05

## 2023-11-05 RX ORDER — BISACODYL 10 MG
10 SUPPOSITORY, RECTAL RECTAL
Status: DISCONTINUED | OUTPATIENT
Start: 2023-11-05 | End: 2023-11-09 | Stop reason: HOSPADM

## 2023-11-05 RX ORDER — CALCIUM GLUCONATE 20 MG/ML
2 INJECTION, SOLUTION INTRAVENOUS ONCE
Status: COMPLETED | OUTPATIENT
Start: 2023-11-05 | End: 2023-11-05

## 2023-11-05 RX ORDER — POTASSIUM CHLORIDE 7.45 MG/ML
10 INJECTION INTRAVENOUS
Status: COMPLETED | OUTPATIENT
Start: 2023-11-05 | End: 2023-11-05

## 2023-11-05 RX ORDER — PANTOPRAZOLE SODIUM 40 MG/10ML
40 INJECTION, POWDER, LYOPHILIZED, FOR SOLUTION INTRAVENOUS 2 TIMES DAILY
Status: DISCONTINUED | OUTPATIENT
Start: 2023-11-05 | End: 2023-11-09 | Stop reason: HOSPADM

## 2023-11-05 RX ADMIN — PANTOPRAZOLE SODIUM 40 MG: 40 INJECTION, POWDER, FOR SOLUTION INTRAVENOUS at 17:03

## 2023-11-05 RX ADMIN — IOHEXOL 95 ML: 350 INJECTION, SOLUTION INTRAVENOUS at 05:45

## 2023-11-05 RX ADMIN — DOCUSATE SODIUM 50 MG AND SENNOSIDES 8.6 MG 2 TABLET: 8.6; 5 TABLET, FILM COATED ORAL at 08:44

## 2023-11-05 RX ADMIN — NICOTINE 7 MG: 7 PATCH, EXTENDED RELEASE TRANSDERMAL at 08:44

## 2023-11-05 RX ADMIN — POTASSIUM CHLORIDE 20 MEQ: 1500 TABLET, EXTENDED RELEASE ORAL at 08:45

## 2023-11-05 RX ADMIN — POTASSIUM CHLORIDE 10 MEQ: 7.46 INJECTION, SOLUTION INTRAVENOUS at 06:48

## 2023-11-05 RX ADMIN — HYDROMORPHONE HYDROCHLORIDE 0.5 MG: 1 INJECTION, SOLUTION INTRAMUSCULAR; INTRAVENOUS; SUBCUTANEOUS at 05:26

## 2023-11-05 RX ADMIN — CEFTRIAXONE SODIUM 2000 MG: 10 INJECTION, POWDER, FOR SOLUTION INTRAVENOUS at 08:20

## 2023-11-05 RX ADMIN — CALCIUM GLUCONATE 2 G: 20 INJECTION, SOLUTION INTRAVENOUS at 05:27

## 2023-11-05 RX ADMIN — OCTREOTIDE ACETATE 50 MCG/HR: 200 INJECTION, SOLUTION INTRAVENOUS; SUBCUTANEOUS at 08:23

## 2023-11-05 RX ADMIN — DOCUSATE SODIUM 50 MG AND SENNOSIDES 8.6 MG 2 TABLET: 8.6; 5 TABLET, FILM COATED ORAL at 17:03

## 2023-11-05 RX ADMIN — PANTOPRAZOLE SODIUM 40 MG: 40 INJECTION, POWDER, FOR SOLUTION INTRAVENOUS at 08:44

## 2023-11-05 RX ADMIN — POTASSIUM CHLORIDE 10 MEQ: 7.46 INJECTION, SOLUTION INTRAVENOUS at 05:41

## 2023-11-05 ASSESSMENT — COGNITIVE AND FUNCTIONAL STATUS - GENERAL
SUGGESTED CMS G CODE MODIFIER DAILY ACTIVITY: CJ
MOVING FROM LYING ON BACK TO SITTING ON SIDE OF FLAT BED: A LITTLE
HELP NEEDED FOR BATHING: A LITTLE
SUGGESTED CMS G CODE MODIFIER MOBILITY: CK
WALKING IN HOSPITAL ROOM: A LITTLE
MOVING TO AND FROM BED TO CHAIR: A LITTLE
STANDING UP FROM CHAIR USING ARMS: A LITTLE
DAILY ACTIVITIY SCORE: 21
MOBILITY SCORE: 18
TOILETING: A LITTLE
DRESSING REGULAR LOWER BODY CLOTHING: A LITTLE
CLIMB 3 TO 5 STEPS WITH RAILING: A LOT

## 2023-11-05 ASSESSMENT — LIFESTYLE VARIABLES
HOW MANY TIMES IN THE PAST YEAR HAVE YOU HAD 5 OR MORE DRINKS IN A DAY: 0
HAVE PEOPLE ANNOYED YOU BY CRITICIZING YOUR DRINKING: NO
ON A TYPICAL DAY WHEN YOU DRINK ALCOHOL HOW MANY DRINKS DO YOU HAVE: 0
ALCOHOL_USE: NO
TOTAL SCORE: 0
EVER HAD A DRINK FIRST THING IN THE MORNING TO STEADY YOUR NERVES TO GET RID OF A HANGOVER: NO
TOTAL SCORE: 0
CONSUMPTION TOTAL: NEGATIVE
AVERAGE NUMBER OF DAYS PER WEEK YOU HAVE A DRINK CONTAINING ALCOHOL: 0
TOTAL SCORE: 0
HAVE YOU EVER FELT YOU SHOULD CUT DOWN ON YOUR DRINKING: NO
EVER FELT BAD OR GUILTY ABOUT YOUR DRINKING: NO
DOES PATIENT WANT TO STOP DRINKING: NO

## 2023-11-05 ASSESSMENT — ENCOUNTER SYMPTOMS
VOMITING: 0
BLOOD IN STOOL: 0
NAUSEA: 0
DIARRHEA: 0
CHILLS: 0
SHORTNESS OF BREATH: 0
CONSTIPATION: 1
SPEECH CHANGE: 0
SENSORY CHANGE: 0
FEVER: 0
HEADACHES: 0
SORE THROAT: 0
MYALGIAS: 0
FOCAL WEAKNESS: 0
FALLS: 0
ABDOMINAL PAIN: 0
COUGH: 0

## 2023-11-05 ASSESSMENT — PAIN DESCRIPTION - PAIN TYPE
TYPE: ACUTE PAIN

## 2023-11-05 ASSESSMENT — PAIN DESCRIPTION - DESCRIPTORS: DESCRIPTORS: SHARP

## 2023-11-05 ASSESSMENT — PATIENT HEALTH QUESTIONNAIRE - PHQ9
1. LITTLE INTEREST OR PLEASURE IN DOING THINGS: SEVERAL DAYS
6. FEELING BAD ABOUT YOURSELF - OR THAT YOU ARE A FAILURE OR HAVE LET YOURSELF OR YOUR FAMILY DOWN: NOT AL ALL
4. FEELING TIRED OR HAVING LITTLE ENERGY: SEVERAL DAYS
5. POOR APPETITE OR OVEREATING: NOT AT ALL
7. TROUBLE CONCENTRATING ON THINGS, SUCH AS READING THE NEWSPAPER OR WATCHING TELEVISION: NOT AT ALL
9. THOUGHTS THAT YOU WOULD BE BETTER OFF DEAD, OR OF HURTING YOURSELF: NOT AT ALL
8. MOVING OR SPEAKING SO SLOWLY THAT OTHER PEOPLE COULD HAVE NOTICED. OR THE OPPOSITE, BEING SO FIGETY OR RESTLESS THAT YOU HAVE BEEN MOVING AROUND A LOT MORE THAN USUAL: NOT AT ALL
3. TROUBLE FALLING OR STAYING ASLEEP OR SLEEPING TOO MUCH: NOT AT ALL
SUM OF ALL RESPONSES TO PHQ9 QUESTIONS 1 AND 2: 1
SUM OF ALL RESPONSES TO PHQ QUESTIONS 1-9: 2
2. FEELING DOWN, DEPRESSED, IRRITABLE, OR HOPELESS: NOT AT ALL

## 2023-11-05 ASSESSMENT — FIBROSIS 4 INDEX
FIB4 SCORE: 1.81
FIB4 SCORE: 5.94

## 2023-11-05 NOTE — ASSESSMENT & PLAN NOTE
Stents in 2006 and 2009.  Has not been on aspirin for the past few weeks. Hold off for now due to acute blood loss anemia  No chest pain  following

## 2023-11-05 NOTE — H&P
Hospital Medicine History & Physical Note    Date of Service  11/5/2023    Primary Care Physician  Pcp Pt States None    Code Status  Full Code    Chief Complaint  Chief Complaint   Patient presents with    Weakness     Pt reports generalized weakness x 5 days. Reports dizziness x 5 days. Due to symptoms, pt reports decrease ambulation and ability to perform ADL.     Abdominal Pain     C/o RLQ intermittent abd pain, stabbing 7/10 when present. Denies dysuria. Reports no BM 5 days.        History of Presenting Illness  Tyrone Giraldo is a 63 y.o. male who presented 11/5/2023 with weakness and fatigue.  History of hepatocellular carcinoma currently following with Dr. Melton, coronary artery disease s/p stent, tobacco abuse, homelessness.  Symptoms have been going on for the past 5 days along with constipation.  No evidence of GI bleed, no blood in stool and no hematemesis.  Recent admission for melena 8/16 - 8/23, evaluated by GI and was found to have esophageal varices s/p banding.  He was discharged on carvedilol, he was taking all his medications up until 2-3 weeks ago when he ran out.    Patient did recently become homeless over the past couple months but that he is finally found a place to stay.    I discussed the plan of care with patient, bedside RN, and edp .    Review of Systems  Review of Systems   Constitutional:  Positive for malaise/fatigue. Negative for chills and fever.   Respiratory:  Negative for cough and shortness of breath.    Cardiovascular:  Negative for chest pain.   Gastrointestinal:  Positive for constipation. Negative for abdominal pain, diarrhea, nausea and vomiting.   Genitourinary:  Negative for dysuria and urgency.       Past Medical History   has a past medical history of CAD (coronary artery disease), Esophageal varices with bleeding (HCC), MI (myocardial infarction) (HCC), and Sciatica.    Surgical History   has a past surgical history that includes other cardiac surgery;  vasectomy; pr upper gi endoscopy,diagnosis (N/A, 8/17/2023); and pr upper gi endoscopy,ligat varix (N/A, 8/17/2023).     Family History  Family history reviewed with patient. There is no family history that is pertinent to the chief complaint.     Social History   reports that he has been smoking cigarettes. He has never used smokeless tobacco. He reports that he does not currently use alcohol. He reports current drug use. Drugs: Inhaled and Marijuana.    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 Taking?   aspirin (ASA) 81 MG Chew Tab chewable tablet   No No   Sig: Chew 1 Tablet every day.   carvedilol (COREG) 3.125 MG Tab   No No   Sig: Take 1 Tablet by mouth 2 times a day with meals.   Patient not taking: Reported on 9/19/2023   omeprazole (PRILOSEC) 20 MG delayed-release capsule   No No   Sig: Take 1 Capsule by mouth 2 times a day.      Facility-Administered Medications: None       Physical Exam  Temp:  [37.4 °C (99.3 °F)] 37.4 °C (99.3 °F)  Pulse:  [] 96  Resp:  [17-18] 18  BP: (101-113)/(58-66) 110/59  SpO2:  [91 %-100 %] 91 %  Blood Pressure: 110/59   Temperature: 37.4 °C (99.3 °F)   Pulse: 92   Respiration: 17   Pulse Oximetry: 95 %       Physical Exam  Constitutional:       Appearance: Normal appearance.   HENT:      Head: Normocephalic and atraumatic.      Mouth/Throat:      Mouth: Mucous membranes are moist.      Pharynx: Oropharynx is clear. No oropharyngeal exudate or posterior oropharyngeal erythema.      Comments: Edentulous  Eyes:      General: No scleral icterus.  Cardiovascular:      Rate and Rhythm: Normal rate and regular rhythm.      Pulses: Normal pulses.      Heart sounds: Normal heart sounds. No murmur heard.  Pulmonary:      Effort: Pulmonary effort is normal. No  "respiratory distress.      Breath sounds: Normal breath sounds. No wheezing.   Abdominal:      Palpations: Abdomen is soft.      Tenderness: There is no abdominal tenderness.   Musculoskeletal:         General: No swelling or tenderness. Normal range of motion.      Cervical back: Normal range of motion.   Skin:     General: Skin is warm and dry.   Neurological:      General: No focal deficit present.      Mental Status: He is alert and oriented to person, place, and time. Mental status is at baseline.   Psychiatric:         Mood and Affect: Mood normal.         Laboratory:  Recent Labs     11/05/23 0318   WBC 7.3   RBC 2.60*   HEMOGLOBIN 5.9*   HEMATOCRIT 20.4*   MCV 78.5*   MCH 22.7*   MCHC 28.9*   RDW 52.0*   PLATELETCT 128*   MPV 12.8     Recent Labs     11/05/23 0318   SODIUM 131*   POTASSIUM 3.0*   CHLORIDE 97   CO2 24   GLUCOSE 160*   BUN 9   CREATININE 0.78   CALCIUM 7.7*     Recent Labs     11/05/23 0318   ALTSGPT 60*   ASTSGOT 28   ALKPHOSPHAT 89   TBILIRUBIN 0.6   LIPASE 35   GLUCOSE 160*         No results for input(s): \"NTPROBNP\" in the last 72 hours.      No results for input(s): \"TROPONINT\" in the last 72 hours.    Imaging:  CT-ABDOMEN-PELVIS WITH   Final Result      1.  Cirrhosis.   2.  Redemonstration of a hepatic segment 6 mass, likely HCC. Progression of disease with likely metastatic disease to the more inferior aspect of segment 6 and the right adrenal gland.   3.  Increased size of the upper abdominal lymphadenopathy, likely metastatic.   4.  Sequela of portal hypertension including gastric varices, trace perihepatic varices, and splenomegaly.   5.  Multiple other low-density hepatic lesions, cysts versus metastatic disease.   6.  Cholelithiasis with thickened wall, possibly reactive. Consider ultrasound as indicated.          Assessment/Plan:  Justification for Admission Status  I anticipate this patient will require at least two midnights for appropriate medical management, necessitating " inpatient admission because GI bleed    * Acute blood loss anemia- (present on admission)  Assessment & Plan  History of cirrhosis secondary to hepatocellular carcinoma and known esophageal varices, banding on last admission 08/2023  I discussed with ED physician, blood transfusion ordered.  Will need very close monitoring for any side effects of transfusion  H/H ordered to continue monitoring, transfuse if less than 7    Secondary esophageal varices with bleeding (HCC)- (present on admission)  Assessment & Plan  Comes in with significant anemia, hemoglobin on presentation 5.9.  History of esophageal varices and melena in the past  S/p banding 08/2023 and has been on carvedilol up until a few weeks ago after running out of medications  GI consult in the morning for acute bleed  Ceftriaxone, octreotide drip, pantoprazole    Hypokalemia- (present on admission)  Assessment & Plan  Replete as needed and check magnesium levels.  BMP ordered to continue monitoring    Other cirrhosis of liver (HCC)- (present on admission)  Assessment & Plan  Secondary to hepatocellular carcinoma    Hepatocellular carcinoma (HCC)- (present on admission)  Assessment & Plan  Currently following with Dr. Melton.  Surgery for his hepatocellular carcinoma has not been planned yet as patient does not have stable housing but says that will be remedied soon    History of MI (myocardial infarction)- (present on admission)  Assessment & Plan  Stents in 2006 and 2009.  Has not been on aspirin for the past few weeks.  Hold off for now due to acute blood loss anemia    Homelessness- (present on admission)  Assessment & Plan  Meds to bed on discharge    Tobacco abuse- (present on admission)  Assessment & Plan  Patient smokes 5 cigarettes a day.  Nicotine patch and/or gum ordered.   I discussed cessation with patient including starting on nicotine patch and/or gum on discharge.  I also discussed medications to help with cessation with patient  including Wellbutrin and Chantix, declined.  Smoking cessation discussed with patient for 4 minutes.      Patient is critically ill.   The patient continues to have: Acute blood loss anemia with a history of esophageal varices and cirrhosis  If untreated there is a high chance of deterioration And eventually death.   The critical care that I am providing today is: Extensive chart review including notes, labs.  I discussed with EDP and patient will be receiving blood transfusions, will need very close monitoring for any side effects.  In addition to the emergency room esophageal varices have started patient on octreotide drip, IV antibiotics and IV PPI.  He will need very close monitoring on telemetry and history of esophageal bleeding past  The critical that has been undertaken is medically complex.   There has been no overlap in critical care time.   Critical Care Time not including procedures: 34 minutes      VTE prophylaxis: SCDs/TEDs

## 2023-11-05 NOTE — ASSESSMENT & PLAN NOTE
Currently following with Dr. Melton.    Surgery for his hepatocellular carcinoma has not been planned yet as patient does not have stable housing, which he is working to remedy

## 2023-11-05 NOTE — ASSESSMENT & PLAN NOTE
I spent nearly  4 minutes on Tobacco cessation education and counseling.   I Discussed the benefits of quitting smoking and risks of continued smoking including cardiovascular disease, cancer and COPD.   Discussed options of nicotine patch.  Nicotine patch ordered

## 2023-11-05 NOTE — PROGRESS NOTES
Hospital Medicine Daily Progress Note    Date of Service  11/5/2023    Chief Complaint  Tyrone Giraldo is a 63 y.o. male admitted 11/5/2023 with weakness, fatigue    Hospital Course  Tyrone Giraldo is a 63 y.o. male who presented 11/5/2023 with weakness and fatigue.  History of hepatocellular carcinoma currently following with Dr. Melton, coronary artery disease s/p stent, tobacco abuse, homelessness.      Symptoms have been going on for the past 5 days along with constipation.  He has seen no obvious GI blood loss - no blood in stool and no hematemesis.  Recent admission for melena 8/16 - 8/23, evaluated by GI here and was found to have esophageal varices s/p banding.  He was discharged on carvedilol, he was taking all his medications up until 2-3 weeks ago when he ran out.     Patient did recently become homeless over the past couple months but that he is finally found a place to stay.    Interval Problem Update  11/5: Has 1 unit PRBC pending. Currently having no obvious bloody vomit or stool. Minimal discomfort, only pain has been intermittent mild RLQ cramping, non-tender to palpation. States he has been constipated 5 days. HR 80s and SBP 110s.     Consulted Dr. Og with GI who kindly saw patient and is planning EGD in AM. Patient cleared for 2g Na diet today, NPO midnight. Continue octreotide, ceftriaxone, PPI. Repeat Hgb 1-2 hours after transfusion. Recheck potassium this afternoon      I have discussed this patient's plan of care and discharge plan at IDT rounds today with Case Management, Nursing, Nursing leadership, and other members of the IDT team.    Consultants/Specialty  GI    Code Status  Full Code    Disposition  The patient is not medically cleared for discharge to home or a post-acute facility.      I have placed the appropriate orders for post-discharge needs.    Review of Systems  Review of Systems   Constitutional:  Positive for malaise/fatigue. Negative for chills and fever.    HENT:  Negative for sore throat.    Respiratory:  Negative for cough and shortness of breath.    Cardiovascular:  Negative for chest pain and leg swelling.   Gastrointestinal:  Positive for constipation. Negative for abdominal pain, blood in stool, diarrhea, nausea and vomiting.   Genitourinary:  Negative for dysuria and urgency.   Musculoskeletal:  Negative for falls and myalgias.   Neurological:  Negative for sensory change, speech change, focal weakness and headaches.        Physical Exam  Temp:  [36.9 °C (98.4 °F)-37.4 °C (99.3 °F)] 37 °C (98.6 °F)  Pulse:  [] 82  Resp:  [17-18] 17  BP: (101-116)/(58-68) 110/64  SpO2:  [91 %-100 %] 99 %    Physical Exam  Constitutional:       Appearance: He is normal weight. He is ill-appearing.   HENT:      Head: Normocephalic and atraumatic.      Mouth/Throat:      Mouth: Mucous membranes are moist.      Pharynx: Oropharynx is clear.      Comments: Edentulous  Cardiovascular:      Rate and Rhythm: Normal rate and regular rhythm.      Pulses: Normal pulses.      Heart sounds: Normal heart sounds.   Pulmonary:      Effort: Pulmonary effort is normal. No respiratory distress.      Breath sounds: Normal breath sounds.   Abdominal:      General: There is distension.      Palpations: Abdomen is soft. There is no mass.      Tenderness: There is no abdominal tenderness. There is no guarding.   Musculoskeletal:         General: No swelling or tenderness. Normal range of motion.      Cervical back: Normal range of motion.   Skin:     General: Skin is warm and dry.   Neurological:      General: No focal deficit present.      Mental Status: He is alert and oriented to person, place, and time. Mental status is at baseline.   Psychiatric:         Mood and Affect: Mood normal.       Fluids    Intake/Output Summary (Last 24 hours) at 11/5/2023 1408  Last data filed at 11/5/2023 1232  Gross per 24 hour   Intake 650 ml   Output --   Net 650 ml       Laboratory  Recent Labs      11/05/23 0318 11/05/23  0908   WBC 7.3  --    RBC 2.60*  --    HEMOGLOBIN 5.9* 5.4*   HEMATOCRIT 20.4* 18.9*   MCV 78.5*  --    MCH 22.7*  --    MCHC 28.9*  --    RDW 52.0*  --    PLATELETCT 128*  --    MPV 12.8  --      Recent Labs     11/05/23  0318   SODIUM 131*   POTASSIUM 3.0*   CHLORIDE 97   CO2 24   GLUCOSE 160*   BUN 9   CREATININE 0.78   CALCIUM 7.7*     Recent Labs     11/05/23  0908   INR 1.14*       Imaging  CT-ABDOMEN-PELVIS WITH   Final Result      1.  Cirrhosis.   2.  Redemonstration of a hepatic segment 6 mass, likely HCC. Progression of disease with likely metastatic disease to the more inferior aspect of segment 6 and the right adrenal gland.   3.  Increased size of the upper abdominal lymphadenopathy, likely metastatic.   4.  Sequela of portal hypertension including gastric varices, trace perihepatic varices, and splenomegaly.   5.  Multiple other low-density hepatic lesions, cysts versus metastatic disease.   6.  Cholelithiasis with thickened wall, possibly reactive. Consider ultrasound as indicated.           Assessment/Plan  * Acute blood loss anemia- (present on admission)  Assessment & Plan  History of cirrhosis secondary to hepatocellular carcinoma and known esophageal varices, banding on last admission 08/2023  1 unit PRBC pending.   Trend Hgb including post transfusion, transfuse if less than 7    Constipation  Assessment & Plan  No reported obstruction or inflammation on CT abdomen  Non-tender, no nausea  Bowel protocol      Homelessness- (present on admission)  Assessment & Plan  Meds to bed on discharge    Hypokalemia- (present on admission)  Assessment & Plan  Repleted IV  Checking magnesium levels.  Recheck K this afternoon    Tobacco abuse- (present on admission)  Assessment & Plan  Patient smokes 5 cigarettes a day.    Nicotine patch and/or gum ordered.     Secondary esophageal varices with bleeding (HCC)- (present on admission)  Assessment & Plan  Comes in with significant anemia,  hemoglobin on presentation 5.9.  History of esophageal varices and melena in the past  S/p banding 08/2023 and has been on carvedilol up until a few weeks ago after running out of medications    GI consulted this morning.   Plan for EGD in AM.   Can eat today, 2g Na restriction, NPO midnight.   Ceftriaxone, octreotide drip, pantoprazole    Other cirrhosis of liver (HCC)- (present on admission)  Assessment & Plan  Secondary to hepatocellular carcinoma    Hepatocellular carcinoma (HCC)- (present on admission)  Assessment & Plan  Currently following with Dr. Melton.    Surgery for his hepatocellular carcinoma has not been planned yet as patient does not have stable housing, which he is working to remedy    History of MI (myocardial infarction)- (present on admission)  Assessment & Plan  Stents in 2006 and 2009.  Has not been on aspirin for the past few weeks. Hold off for now due to acute blood loss anemia      VTE prophylaxis:   SCDs/TEDs    I have performed a physical exam and reviewed and updated ROS and Plan today (11/5/2023). In review of yesterday's note (11/4/2023), there are no changes except as documented above.

## 2023-11-05 NOTE — ED PROVIDER NOTES
CHIEF COMPLAINT  Chief Complaint   Patient presents with    Weakness     Pt reports generalized weakness x 5 days. Reports dizziness x 5 days. Due to symptoms, pt reports decrease ambulation and ability to perform ADL.     Abdominal Pain     C/o RLQ intermittent abd pain, stabbing 7/10 when present. Denies dysuria. Reports no BM 5 days.        LIMITATION TO HISTORY   Select:     HPI    Tyrone Giraldo is a 63 y.o. male who presents to the Emergency Department for evaluation of generalized weakness for the past 5 days reports some dizziness reports increasing pain in his right lower quadrant reports a 7 out of 10 has a history of liver cancer reports he has been evaluated for surgical treatment no chemotherapy he reports he has been constipated has not had a bowel movement in 5 days no vomiting no blood in his urine or poop, no black stools    OUTSIDE HISTORIAN(S):  Select:    EXTERNAL RECORDS REVIEWED  Select: Reviewed hospital visit from August 16, 2023      PAST MEDICAL HISTORY  Past Medical History:   Diagnosis Date    CAD (coronary artery disease)     PCI    Esophageal varices with bleeding (HCC)     7 bands placed 08/17    MI (myocardial infarction) (HCC)     x2, 2006 and 2009    Sciatica      .    SURGICAL HISTORY  Past Surgical History:   Procedure Laterality Date    NE UPPER GI ENDOSCOPY,DIAGNOSIS N/A 8/17/2023    Procedure: GASTROSCOPY;  Surgeon: Dixon Zendejas M.D.;  Location: SURGERY SAME DAY [facility];  Service: Gastroenterology    NE UPPER GI ENDOSCOPY,LIGAT VARIX N/A 8/17/2023    Procedure: GASTROSCOPY, WITH BANDING;  Surgeon: Dixon Zendejas M.D.;  Location: SURGERY SAME DAY [facility];  Service: Gastroenterology    OTHER CARDIAC SURGERY      stent    VASECTOMY           FAMILY HISTORY  History reviewed. No pertinent family history.       SOCIAL HISTORY  Social History     Socioeconomic History    Marital status: Single     Spouse name: Not on file    Number of children: Not on file    Years of  education: Not on file    Highest education level: Not on file   Occupational History    Not on file   Tobacco Use    Smoking status: Some Days     Types: Cigarettes    Smokeless tobacco: Never    Tobacco comments:     10-12 cigs/day   Vaping Use    Vaping Use: Never used   Substance and Sexual Activity    Alcohol use: Not Currently     Comment: rare    Drug use: Yes     Types: Inhaled, Marijuana     Comment: marijuana      Sexual activity: Not on file   Other Topics Concern    Not on file   Social History Narrative    Not on file     Social Determinants of Health     Financial Resource Strain: Not on file   Food Insecurity: Not on file   Transportation Needs: Not on file   Physical Activity: Not on file   Stress: Not on file   Social Connections: Not on file   Intimate Partner Violence: Not on file   Housing Stability: Not on file         CURRENT MEDICATIONS  No current facility-administered medications on file prior to encounter.     Current Outpatient Medications on File Prior to Encounter   Medication Sig Dispense Refill    carvedilol (COREG) 3.125 MG Tab Take 1 Tablet by mouth 2 times a day with meals. (Patient not taking: Reported on 9/19/2023) 60 Tablet 1    omeprazole (PRILOSEC) 20 MG delayed-release capsule Take 1 Capsule by mouth 2 times a day. 30 Capsule 1    aspirin (ASA) 81 MG Chew Tab chewable tablet Chew 1 Tablet every day. 100 Tablet 0           ALLERGIES  Allergies   Allergen Reactions    Cortisone Rash    Morphine        PHYSICAL EXAM  VITAL SIGNS:/59   Pulse 92   Temp 37.4 °C (99.3 °F) (Temporal)   Resp 17   Ht 1.829 m (6')   Wt 77.6 kg (171 lb)   SpO2 95%   BMI 23.19 kg/m²       GENERAL: Awake and alert  HEAD: Normocephalic and atraumatic  NECK: Normal range of motion, without meningismus  EYES: Pupils Equal, Round, Reactive to Light, extraocular movements intact, conjunctiva white  ENT: Mucous membranes moist, oropharynx clear  PULMONARY: Normal effort, clear to  auscultation  CARDIOVASCULAR: Tachycardic right lower quadrant tenderness no murmurs, clicks or rubs, peripheral pulses 2+  ABDOMINAL: Soft, non-tender, no guarding or rigidity present, no pulsatile masses  BACK: no midline tenderness, no costovertebral tenderness  NEUROLOGICAL: Grossly non-focal neurological examination, speech normal, gait normal  EXTREMITIES: No edema, normal to inspection  SKIN: Warm and dry.  PSYCHIATRIC: Affect is appropriate    DIAGNOSTIC STUDIES / PROCEDURES  EKG  CRITICAL CARE  The very real possibilty of a deterioration of this patient's condition required the highest level of my preparedness for sudden, emergent intervention.  Patient with acute anemia tachycardia he was emergently transfused we will plan to order 2 units of blood.  Do feel patient's anemia was not treated he would be at high probability of suffering from a stroke or heart attack myocardial infarction.  I provided critical care services, which included medication orders, frequent reevaluations of the patient's condition and response to treatment, ordering and reviewing test results, and discussing the case with various consultants.  The critical care time associated with the care of the patient was 33 minutes. Review chart for interventions. This time is exclusive of any other billable procedures.           LABS  Labs Reviewed   CBC WITH DIFFERENTIAL - Abnormal; Notable for the following components:       Result Value    RBC 2.60 (*)     Hemoglobin 5.9 (*)     Hematocrit 20.4 (*)     MCV 78.5 (*)     MCH 22.7 (*)     MCHC 28.9 (*)     RDW 52.0 (*)     Platelet Count 128 (*)     Neutrophils-Polys 78.40 (*)     Lymphocytes 13.80 (*)     Nucleated RBC 0.30 (*)     All other components within normal limits   COMP METABOLIC PANEL - Abnormal; Notable for the following components:    Sodium 131 (*)     Potassium 3.0 (*)     Glucose 160 (*)     Calcium 7.7 (*)     Correct Calcium 8.4 (*)     ALT(SGPT) 60 (*)     Albumin 3.1 (*)      All other components within normal limits   LIPASE   ESTIMATED GFR   DIFFERENTIAL MANUAL   PERIPHERAL SMEAR REVIEW   PLATELET ESTIMATE   MORPHOLOGY   URINALYSIS   COD (ADULT)   TRANSFUSE RED BLOOD CELLS-NURSING COMMUNICATION (UNITS)         RADIOLOGY  I independently reviewed and interpreted the images obtained today in the ER.    Radiologist interpretation:   CT-ABDOMEN-PELVIS WITH   Final Result      1.  Cirrhosis.   2.  Redemonstration of a hepatic segment 6 mass, likely HCC. Progression of disease with likely metastatic disease to the more inferior aspect of segment 6 and the right adrenal gland.   3.  Increased size of the upper abdominal lymphadenopathy, likely metastatic.   4.  Sequela of portal hypertension including gastric varices, trace perihepatic varices, and splenomegaly.   5.  Multiple other low-density hepatic lesions, cysts versus metastatic disease.   6.  Cholelithiasis with thickened wall, possibly reactive. Consider ultrasound as indicated.           COURSE & MEDICAL DECISION MAKING    ED COURSE:        INTERVENTIONS BY ME:  Medications   calcium GLUConate 2 g in NaCl IVPB premix (2 g Intravenous New Bag 11/5/23 0527)   potassium chloride (Kcl) ivpb 10 mEq (10 mEq Intravenous New Bag 11/5/23 0541)   HYDROmorphone (Dilaudid) injection 0.5 mg (0.5 mg Intravenous Given 11/5/23 0526)   iohexol (OMNIPAQUE) 350 mg/mL (IV) (95 mL Intravenous Given 11/5/23 0545)       Response on recheck:      6 AM patient has no right upper quadrant tenderness no postprandial pain do not suspect cholecystitis at this time    INITIAL ASSESSMENT, COURSE AND PLAN  Care Narrative:     Patient with history of liver cancer presenting with abdominal pain, generalized weakness, given his pain CT scan was obtained which was notable for metastatic liver cancer without significant complication such as a bowel obstruction no evidence of appendicitis.  Blood work also both notable for significant anemia, prompting transfusion  with 1 unit of packed red blood cells in addition to hypokalemia which was repleted.  Given his abnormal blood work and his weakness he will be admitted to the hospital for further management and care.  CT scan did show thickened gallbladder wall I do not suspect cholecystitis at this time patient had no postprandial pain negative Priest sign, no leukocytosis or transaminitis spoke to Dr. Olsen at 6 AM who accepted admission and we discussed results of patient's tests         ADDITIONAL PROBLEM LIST    DISPOSITION AND DISCUSSIONS  I have discussed management of the patient with the following physicians and MANDA's: Spoke to Dr. Olsen at 6 AM who excepted admission to the hospital we discussed the patient's results and plan of care      FINAL DIAGNOSIS  1. Anemia, unspecified type    2. Hypokalemia    3. Lower abdominal pain    4. History of cancer             Electronically signed by: Brady Hermosillo DO ,6:03 AM 11/05/23

## 2023-11-05 NOTE — ED TRIAGE NOTES
Chief Complaint   Patient presents with    Weakness     Pt reports generalized weakness x 5 days. Reports dizziness x 5 days. Due to symptoms, pt reports decrease ambulation and ability to perform ADL.     Abdominal Pain     C/o RLQ intermittent abd pain, stabbing 7/10 when present. Denies dysuria. Reports no BM 5 days.        64 y/o male. Aaox4. Abd pain pain protocol orders in process.     Pt place in waiting area. Educated on triage process. Pt will inform staff of any medical changes.    /66   Pulse (!) 112   Temp 37.4 °C (99.3 °F) (Temporal)   Resp 18   Ht 1.829 m (6')   Wt 77.6 kg (171 lb)   SpO2 100%   BMI 23.19 kg/m²      Detail Level: Zone

## 2023-11-05 NOTE — CONSULTS
Date of Consultation:  11/5/2023    Patient: : Tyrone Giraldo  MRN: [id number]    Referring Physician:  Andrew BUSTAMANTE     GI:Javier Og M.D.     Reason for Consultation: Anemia, history of esophageal varices, cirrhosis    History of Present Illness:   63-year-old male known to the GI service having been seen in August as an inpatient.  Patient has hepatocellular carcinoma and is followed with hepatobiliary surgery.  Over the past number of days the patient has become progressively weak.  No hematemesis no melena.  Hemoglobin at baseline was 9.1 and on evaluation in the emergency room was 5.9.  He has been admitted.  GI consulted for consideration of upper GI bleed.      Past Medical History:   Diagnosis Date    CAD (coronary artery disease)     PCI    Esophageal varices with bleeding (HCC)     7 bands placed 08/17    MI (myocardial infarction) (HCC)     x2, 2006 and 2009    Sciatica        Past Surgical History:   Procedure Laterality Date    [place] UPPER GI ENDOSCOPY,DIAGNOSIS N/A 8/17/2023    Procedure: GASTROSCOPY;  Surgeon: Dixon Zendejas M.D.;  Location: SURGERY SAME DAY [facility];  Service: Gastroenterology    WA UPPER GI ENDOSCOPY,LIGAT VARIX N/A 8/17/2023    Procedure: GASTROSCOPY, WITH BANDING;  Surgeon: Dixon Zendejas M.D.;  Location: SURGERY SAME DAY [facility];  Service: Gastroenterology    OTHER CARDIAC SURGERY      stent    VASECTOMY         History reviewed. No pertinent family history.    Social History     Socioeconomic History    Marital status: Single   Tobacco Use    Smoking status: Some Days     Types: Cigarettes    Smokeless tobacco: Never    Tobacco comments:     10-12 cigs/day   Vaping Use    Vaping Use: Never used   Substance and Sexual Activity    Alcohol use: Not Currently     Comment: rare    Drug use: Yes     Types: Inhaled, Marijuana     Comment: marijuana         Current Meds (name, sig, last dose):     Current Facility-Administered Medications:     pantoprazole    cefTRIAXone  (ROCEPHIN) IV    octreotide (SandoSTATIN) 1,250 mcg in  mL Infusion    potassium chloride SA    senna-docusate **AND** polyethylene glycol/lytes **AND** magnesium hydroxide **AND** bisacodyl    nicotine **AND** Nicotine Replacement Patient Education Materials **AND** nicotine polacrilex      ROS  10 systems reviewed and are negative unless otherwise noted in history of present illness.    Physical Exam:  Vitals:    11/05/23 0502 11/05/23 0530 11/05/23 0600 11/05/23 0630   BP: 110/59 101/58 110/59 109/60   Pulse: 92 92 96 95   Resp: 17 18 18 18   Temp:       TempSrc:       SpO2: 95% 98% 91% 97%   Weight:       Height:           Physical Exam  Constitutional:       Appearance: He is ill-appearing.   Eyes:      General: No scleral icterus.  Cardiovascular:      Rate and Rhythm: Normal rate and regular rhythm.   Pulmonary:      Effort: Pulmonary effort is normal.      Breath sounds: Normal breath sounds.   Abdominal:      General: Bowel sounds are normal.      Palpations: Abdomen is soft.   Neurological:      General: No focal deficit present.      Mental Status: He is alert and oriented to person, place, and time.   Psychiatric:         Behavior: Behavior normal.         Thought Content: Thought content normal.         Judgment: Judgment normal.           Labs:  Recent Labs     11/05/23 0318   SODIUM 131*   POTASSIUM 3.0*   CHLORIDE 97   CO2 24   BUN 9   CREATININE 0.78   MAGNESIUM 1.9   CALCIUM 7.7*     Recent Labs     11/05/23 0318   ALTSGPT 60*   ASTSGOT 28   ALKPHOSPHAT 89   TBILIRUBIN 0.6   LIPASE 35   GLUCOSE 160*     Recent Labs     11/05/23 0318   WBC 7.3   NEUTSPOLYS 78.40*   LYMPHOCYTES 13.80*   MONOCYTES 3.50   EOSINOPHILS 2.60   BASOPHILS 1.70   ASTSGOT 28   ALTSGPT 60*   ALKPHOSPHAT 89   TBILIRUBIN 0.6     Recent Labs     11/05/23 0318   RBC 2.60*   HEMOGLOBIN 5.9*   HEMATOCRIT 20.4*   PLATELETCT 128*     Recent Results (from the past 24 hour(s))   CBC WITH DIFFERENTIAL    Collection Time:  11/05/23  3:18 AM   Result Value Ref Range    WBC 7.3 4.8 - 10.8 K/uL    RBC 2.60 (L) 4.70 - 6.10 M/uL    Hemoglobin 5.9 (LL) 14.0 - 18.0 g/dL    Hematocrit 20.4 (L) 42.0 - 52.0 %    MCV 78.5 (L) 81.4 - 97.8 fL    MCH 22.7 (L) 27.0 - 33.0 pg    MCHC 28.9 (L) 32.3 - 36.5 g/dL    RDW 52.0 (H) 35.9 - 50.0 fL    Platelet Count 128 (L) 164 - 446 K/uL    MPV 12.8 9.0 - 12.9 fL    Neutrophils-Polys 78.40 (H) 44.00 - 72.00 %    Lymphocytes 13.80 (L) 22.00 - 41.00 %    Monocytes 3.50 0.00 - 13.40 %    Eosinophils 2.60 0.00 - 6.90 %    Basophils 1.70 0.00 - 1.80 %    Nucleated RBC 0.30 (H) 0.00 - 0.20 /100 WBC    Neutrophils (Absolute) 5.72 1.82 - 7.42 K/uL    Lymphs (Absolute) 1.01 1.00 - 4.80 K/uL    Monos (Absolute) 0.26 0.00 - 0.85 K/uL    Eos (Absolute) 0.19 0.00 - 0.51 K/uL    Baso (Absolute) 0.12 0.00 - 0.12 K/uL    NRBC (Absolute) 0.02 K/uL    Hypochromia 1+     Anisocytosis 1+     Macrocytosis 1+    COMP METABOLIC PANEL    Collection Time: 11/05/23  3:18 AM   Result Value Ref Range    Sodium 131 (L) 135 - 145 mmol/L    Potassium 3.0 (L) 3.6 - 5.5 mmol/L    Chloride 97 96 - 112 mmol/L    Co2 24 20 - 33 mmol/L    Anion Gap 10.0 7.0 - 16.0    Glucose 160 (H) 65 - 99 mg/dL    Bun 9 8 - 22 mg/dL    Creatinine 0.78 0.50 - 1.40 mg/dL    Calcium 7.7 (L) 8.5 - 10.5 mg/dL    Correct Calcium 8.4 (L) 8.5 - 10.5 mg/dL    AST(SGOT) 28 12 - 45 U/L    ALT(SGPT) 60 (H) 2 - 50 U/L    Alkaline Phosphatase 89 30 - 99 U/L    Total Bilirubin 0.6 0.1 - 1.5 mg/dL    Albumin 3.1 (L) 3.2 - 4.9 g/dL    Total Protein 6.5 6.0 - 8.2 g/dL    Globulin 3.4 1.9 - 3.5 g/dL    A-G Ratio 0.9 g/dL   LIPASE    Collection Time: 11/05/23  3:18 AM   Result Value Ref Range    Lipase 35 11 - 82 U/L   ESTIMATED GFR    Collection Time: 11/05/23  3:18 AM   Result Value Ref Range    GFR (CKD-EPI) 99 >60 mL/min/1.73 m 2   DIFFERENTIAL MANUAL    Collection Time: 11/05/23  3:18 AM   Result Value Ref Range    Manual Diff Status PERFORMED    PERIPHERAL SMEAR REVIEW     Collection Time: 11/05/23  3:18 AM   Result Value Ref Range    Peripheral Smear Review see below    PLATELET ESTIMATE    Collection Time: 11/05/23  3:18 AM   Result Value Ref Range    Plt Estimation Decreased    MORPHOLOGY    Collection Time: 11/05/23  3:18 AM   Result Value Ref Range    RBC Morphology Present     Poikilocytosis 1+     Ovalocytes 1+    MAGNESIUM    Collection Time: 11/05/23  3:18 AM   Result Value Ref Range    Magnesium 1.9 1.5 - 2.5 mg/dL   COD - Adult (Type and Screen)    Collection Time: 11/05/23  4:30 AM   Result Value Ref Range    ABO Grouping Only A     Rh Grouping Only POS     Antibody Screen-Cod POS (A)          Imaging:  CT-ABDOMEN-PELVIS WITH  Narrative: 11/5/2023 5:06 AM    HISTORY/REASON FOR EXAM:  RLQ pain 5 days, anemia, h/o cancer.    TECHNIQUE/EXAM DESCRIPTION:   CT scan of the abdomen and pelvis with contrast.    Contrast-enhanced helical scanning was obtained from the diaphragmatic domes through the pubic symphysis following the bolus administration of nonionic contrast without complication.    95 mL of Omnipaque 350 nonionic contrast was administered without complication.    Low dose optimization technique was utilized for this CT exam including automated exposure control and adjustment of the mA and/or kV according to patient size.    COMPARISON: CT abdomen pelvis 8/20/2023    FINDINGS:  Lower Chest: Unremarkable.    Liver: Nodular hepatic contour. Segment 6 mass measuring approximately 6.2 cm, previously 4.7 cm. Multiple other small low-density lesions, nonspecific. Heterogeneity of the more inferior segment 6..    Spleen: Enlarged.    Pancreas: Unremarkable.    Gallbladder: Cholelithiasis with thickened wall.    Biliary: Nondilated.    Adrenal glands: 2.3 cm right adrenal lesion.    Kidneys: Unremarkable without hydronephrosis.    Bowel: No obstruction or acute inflammation.    Lymph nodes: Multi station upper abdominal enlarged lymph nodes, increased in size from  prior.    Vasculature: Gastric varices. Atherosclerotic changes.    Peritoneum: Trace perihepatic ascites.    Musculoskeletal: No acute or destructive process.    Pelvis: No adenopathy or free fluid.  Impression: 1.  Cirrhosis.  2.  Redemonstration of a hepatic segment 6 mass, likely HCC. Progression of disease with likely metastatic disease to the more inferior aspect of segment 6 and the right adrenal gland.  3.  Increased size of the upper abdominal lymphadenopathy, likely metastatic.  4.  Sequela of portal hypertension including gastric varices, trace perihepatic varices, and splenomegaly.  5.  Multiple other low-density hepatic lesions, cysts versus metastatic disease.  6.  Cholelithiasis with thickened wall, possibly reactive. Consider ultrasound as indicated.      (CT 8/23)  IMPRESSION:     Hepatic cirrhosis with hyperenhancing mass with washout the RIGHT lobe liver, segment 6, measuring 5 cm, surrounding hyperemia and pseudocapsule, most consistent with hepatocellular carcinoma.  Multiple small cystic lesions primarily in the inferior RIGHT lobe liver.  Enlarged kashmir hepatis, portacaval, and celiac lymph nodes, likely metastatic.  Cholelithiasis.  Gastric varices at the fundus with probable splenorenal shunt.      Endoscopy history   8/17/23:  EGD:   Esophageal varices, 3 columns, mod size, no active bleeding now, s/p 7 banding due to size.   Portal hypertensive gastropathy with scattered oozing in body, no treatment provided. Could be bleeding more before.   No overt gastric varices.   Some remaining blood in fundus.   Duodenum grossly normal.     MDM Data Review:  -Records reviewed and summarized in current documentation  -I personally reviewed and interpreted the laboratory results  -I personally reviewed the radiology images  -I have personally reviewed medications    Hospital Problem List:  Active Hospital Problems    Diagnosis     Tobacco abuse [Z72.0]     Hypokalemia [E87.6]     Homelessness [Z59.00]      Secondary esophageal varices with bleeding (HCC) [I85.11]     Other cirrhosis of liver (HCC) [K74.69]     Hepatocellular carcinoma (HCC) [C22.0]     Acute blood loss anemia [D62]     History of MI (myocardial infarction) [I25.2]        Impressions:  63-year-old male with cirrhosis, hepatocellular carcinoma, history of esophagogastric varices presents with progressive fatigue.  Hemoglobin down to 5.9.  No obvious bloody show.  I discussed upper endoscopy with the patient.  He is willing to proceed.  Plan would be tomorrow.  Patient can have a 2 g sodium restriction diet today.  N.p.o. after midnight.        Recommendations:  N.p.o. after midnight.  Okay for 2 g sodium restriction diet today.  Agree with octreotide, ceftriaxone, transfusion.  EGD in am

## 2023-11-05 NOTE — ED NOTES
Patient bedside report  given to RN Fer . Pt AAO X 4 , respirations even and unlabored, on room air. Call light in reach, fall risk interventions in place.

## 2023-11-05 NOTE — ASSESSMENT & PLAN NOTE
History of cirrhosis secondary to hepatocellular carcinoma and known esophageal varices, banding on last admission 08/2023  S/p transfusion, now h/h in stable range, continue to follow , repeat cbc in am  Trend Hgb including post transfusion, transfuse if less than 7

## 2023-11-05 NOTE — H&P (VIEW-ONLY)
Date of Consultation:  11/5/2023    Patient: : Tyrone Giraldo  MRN: [id number]    Referring Physician:  Andrew BUSTAMANTE     GI:Javier Og M.D.     Reason for Consultation: Anemia, history of esophageal varices, cirrhosis    History of Present Illness:   63-year-old male known to the GI service having been seen in August as an inpatient.  Patient has hepatocellular carcinoma and is followed with hepatobiliary surgery.  Over the past number of days the patient has become progressively weak.  No hematemesis no melena.  Hemoglobin at baseline was 9.1 and on evaluation in the emergency room was 5.9.  He has been admitted.  GI consulted for consideration of upper GI bleed.      Past Medical History:   Diagnosis Date    CAD (coronary artery disease)     PCI    Esophageal varices with bleeding (HCC)     7 bands placed 08/17    MI (myocardial infarction) (HCC)     x2, 2006 and 2009    Sciatica        Past Surgical History:   Procedure Laterality Date    PA UPPER GI ENDOSCOPY,DIAGNOSIS N/A 8/17/2023    Procedure: GASTROSCOPY;  Surgeon: Dixon Zendejas M.D.;  Location: SURGERY SAME DAY [facility];  Service: Gastroenterology    PA UPPER GI ENDOSCOPY,LIGAT VARIX N/A 8/17/2023    Procedure: GASTROSCOPY, WITH BANDING;  Surgeon: Dixon Zendejas M.D.;  Location: SURGERY SAME DAY [facility];  Service: Gastroenterology    OTHER CARDIAC SURGERY      stent    VASECTOMY         History reviewed. No pertinent family history.    Social History     Socioeconomic History    Marital status: Single   Tobacco Use    Smoking status: Some Days     Types: Cigarettes    Smokeless tobacco: Never    Tobacco comments:     10-12 cigs/day   Vaping Use    Vaping Use: Never used   Substance and Sexual Activity    Alcohol use: Not Currently     Comment: rare    Drug use: Yes     Types: Inhaled, Marijuana     Comment: marijuana         Current Meds (name, sig, last dose):     Current Facility-Administered Medications:     pantoprazole    cefTRIAXone  (ROCEPHIN) IV    octreotide (SandoSTATIN) 1,250 mcg in  mL Infusion    potassium chloride SA    senna-docusate **AND** polyethylene glycol/lytes **AND** magnesium hydroxide **AND** bisacodyl    nicotine **AND** Nicotine Replacement Patient Education Materials **AND** nicotine polacrilex      ROS  10 systems reviewed and are negative unless otherwise noted in history of present illness.    Physical Exam:  Vitals:    11/05/23 0502 11/05/23 0530 11/05/23 0600 11/05/23 0630   BP: 110/59 101/58 110/59 109/60   Pulse: 92 92 96 95   Resp: 17 18 18 18   Temp:       TempSrc:       SpO2: 95% 98% 91% 97%   Weight:       Height:           Physical Exam  Constitutional:       Appearance: He is ill-appearing.   Eyes:      General: No scleral icterus.  Cardiovascular:      Rate and Rhythm: Normal rate and regular rhythm.   Pulmonary:      Effort: Pulmonary effort is normal.      Breath sounds: Normal breath sounds.   Abdominal:      General: Bowel sounds are normal.      Palpations: Abdomen is soft.   Neurological:      General: No focal deficit present.      Mental Status: He is alert and oriented to person, place, and time.   Psychiatric:         Behavior: Behavior normal.         Thought Content: Thought content normal.         Judgment: Judgment normal.           Labs:  Recent Labs     11/05/23 0318   SODIUM 131*   POTASSIUM 3.0*   CHLORIDE 97   CO2 24   BUN 9   CREATININE 0.78   MAGNESIUM 1.9   CALCIUM 7.7*     Recent Labs     11/05/23 0318   ALTSGPT 60*   ASTSGOT 28   ALKPHOSPHAT 89   TBILIRUBIN 0.6   LIPASE 35   GLUCOSE 160*     Recent Labs     11/05/23 0318   WBC 7.3   NEUTSPOLYS 78.40*   LYMPHOCYTES 13.80*   MONOCYTES 3.50   EOSINOPHILS 2.60   BASOPHILS 1.70   ASTSGOT 28   ALTSGPT 60*   ALKPHOSPHAT 89   TBILIRUBIN 0.6     Recent Labs     11/05/23 0318   RBC 2.60*   HEMOGLOBIN 5.9*   HEMATOCRIT 20.4*   PLATELETCT 128*     Recent Results (from the past 24 hour(s))   CBC WITH DIFFERENTIAL    Collection Time:  11/05/23  3:18 AM   Result Value Ref Range    WBC 7.3 4.8 - 10.8 K/uL    RBC 2.60 (L) 4.70 - 6.10 M/uL    Hemoglobin 5.9 (LL) 14.0 - 18.0 g/dL    Hematocrit 20.4 (L) 42.0 - 52.0 %    MCV 78.5 (L) 81.4 - 97.8 fL    MCH 22.7 (L) 27.0 - 33.0 pg    MCHC 28.9 (L) 32.3 - 36.5 g/dL    RDW 52.0 (H) 35.9 - 50.0 fL    Platelet Count 128 (L) 164 - 446 K/uL    MPV 12.8 9.0 - 12.9 fL    Neutrophils-Polys 78.40 (H) 44.00 - 72.00 %    Lymphocytes 13.80 (L) 22.00 - 41.00 %    Monocytes 3.50 0.00 - 13.40 %    Eosinophils 2.60 0.00 - 6.90 %    Basophils 1.70 0.00 - 1.80 %    Nucleated RBC 0.30 (H) 0.00 - 0.20 /100 WBC    Neutrophils (Absolute) 5.72 1.82 - 7.42 K/uL    Lymphs (Absolute) 1.01 1.00 - 4.80 K/uL    Monos (Absolute) 0.26 0.00 - 0.85 K/uL    Eos (Absolute) 0.19 0.00 - 0.51 K/uL    Baso (Absolute) 0.12 0.00 - 0.12 K/uL    NRBC (Absolute) 0.02 K/uL    Hypochromia 1+     Anisocytosis 1+     Macrocytosis 1+    COMP METABOLIC PANEL    Collection Time: 11/05/23  3:18 AM   Result Value Ref Range    Sodium 131 (L) 135 - 145 mmol/L    Potassium 3.0 (L) 3.6 - 5.5 mmol/L    Chloride 97 96 - 112 mmol/L    Co2 24 20 - 33 mmol/L    Anion Gap 10.0 7.0 - 16.0    Glucose 160 (H) 65 - 99 mg/dL    Bun 9 8 - 22 mg/dL    Creatinine 0.78 0.50 - 1.40 mg/dL    Calcium 7.7 (L) 8.5 - 10.5 mg/dL    Correct Calcium 8.4 (L) 8.5 - 10.5 mg/dL    AST(SGOT) 28 12 - 45 U/L    ALT(SGPT) 60 (H) 2 - 50 U/L    Alkaline Phosphatase 89 30 - 99 U/L    Total Bilirubin 0.6 0.1 - 1.5 mg/dL    Albumin 3.1 (L) 3.2 - 4.9 g/dL    Total Protein 6.5 6.0 - 8.2 g/dL    Globulin 3.4 1.9 - 3.5 g/dL    A-G Ratio 0.9 g/dL   LIPASE    Collection Time: 11/05/23  3:18 AM   Result Value Ref Range    Lipase 35 11 - 82 U/L   ESTIMATED GFR    Collection Time: 11/05/23  3:18 AM   Result Value Ref Range    GFR (CKD-EPI) 99 >60 mL/min/1.73 m 2   DIFFERENTIAL MANUAL    Collection Time: 11/05/23  3:18 AM   Result Value Ref Range    Manual Diff Status PERFORMED    PERIPHERAL SMEAR REVIEW     Collection Time: 11/05/23  3:18 AM   Result Value Ref Range    Peripheral Smear Review see below    PLATELET ESTIMATE    Collection Time: 11/05/23  3:18 AM   Result Value Ref Range    Plt Estimation Decreased    MORPHOLOGY    Collection Time: 11/05/23  3:18 AM   Result Value Ref Range    RBC Morphology Present     Poikilocytosis 1+     Ovalocytes 1+    MAGNESIUM    Collection Time: 11/05/23  3:18 AM   Result Value Ref Range    Magnesium 1.9 1.5 - 2.5 mg/dL   COD - Adult (Type and Screen)    Collection Time: 11/05/23  4:30 AM   Result Value Ref Range    ABO Grouping Only A     Rh Grouping Only POS     Antibody Screen-Cod POS (A)          Imaging:  CT-ABDOMEN-PELVIS WITH  Narrative: 11/5/2023 5:06 AM    HISTORY/REASON FOR EXAM:  RLQ pain 5 days, anemia, h/o cancer.    TECHNIQUE/EXAM DESCRIPTION:   CT scan of the abdomen and pelvis with contrast.    Contrast-enhanced helical scanning was obtained from the diaphragmatic domes through the pubic symphysis following the bolus administration of nonionic contrast without complication.    95 mL of Omnipaque 350 nonionic contrast was administered without complication.    Low dose optimization technique was utilized for this CT exam including automated exposure control and adjustment of the mA and/or kV according to patient size.    COMPARISON: CT abdomen pelvis 8/20/2023    FINDINGS:  Lower Chest: Unremarkable.    Liver: Nodular hepatic contour. Segment 6 mass measuring approximately 6.2 cm, previously 4.7 cm. Multiple other small low-density lesions, nonspecific. Heterogeneity of the more inferior segment 6..    Spleen: Enlarged.    Pancreas: Unremarkable.    Gallbladder: Cholelithiasis with thickened wall.    Biliary: Nondilated.    Adrenal glands: 2.3 cm right adrenal lesion.    Kidneys: Unremarkable without hydronephrosis.    Bowel: No obstruction or acute inflammation.    Lymph nodes: Multi station upper abdominal enlarged lymph nodes, increased in size from  prior.    Vasculature: Gastric varices. Atherosclerotic changes.    Peritoneum: Trace perihepatic ascites.    Musculoskeletal: No acute or destructive process.    Pelvis: No adenopathy or free fluid.  Impression: 1.  Cirrhosis.  2.  Redemonstration of a hepatic segment 6 mass, likely HCC. Progression of disease with likely metastatic disease to the more inferior aspect of segment 6 and the right adrenal gland.  3.  Increased size of the upper abdominal lymphadenopathy, likely metastatic.  4.  Sequela of portal hypertension including gastric varices, trace perihepatic varices, and splenomegaly.  5.  Multiple other low-density hepatic lesions, cysts versus metastatic disease.  6.  Cholelithiasis with thickened wall, possibly reactive. Consider ultrasound as indicated.      (CT 8/23)  IMPRESSION:     Hepatic cirrhosis with hyperenhancing mass with washout the RIGHT lobe liver, segment 6, measuring 5 cm, surrounding hyperemia and pseudocapsule, most consistent with hepatocellular carcinoma.  Multiple small cystic lesions primarily in the inferior RIGHT lobe liver.  Enlarged kashmir hepatis, portacaval, and celiac lymph nodes, likely metastatic.  Cholelithiasis.  Gastric varices at the fundus with probable splenorenal shunt.      Endoscopy history   8/17/23:  EGD:   Esophageal varices, 3 columns, mod size, no active bleeding now, s/p 7 banding due to size.   Portal hypertensive gastropathy with scattered oozing in body, no treatment provided. Could be bleeding more before.   No overt gastric varices.   Some remaining blood in fundus.   Duodenum grossly normal.     MDM Data Review:  -Records reviewed and summarized in current documentation  -I personally reviewed and interpreted the laboratory results  -I personally reviewed the radiology images  -I have personally reviewed medications    Hospital Problem List:  Active Hospital Problems    Diagnosis     Tobacco abuse [Z72.0]     Hypokalemia [E87.6]     Homelessness [Z59.00]      Secondary esophageal varices with bleeding (HCC) [I85.11]     Other cirrhosis of liver (HCC) [K74.69]     Hepatocellular carcinoma (HCC) [C22.0]     Acute blood loss anemia [D62]     History of MI (myocardial infarction) [I25.2]        Impressions:  63-year-old male with cirrhosis, hepatocellular carcinoma, history of esophagogastric varices presents with progressive fatigue.  Hemoglobin down to 5.9.  No obvious bloody show.  I discussed upper endoscopy with the patient.  He is willing to proceed.  Plan would be tomorrow.  Patient can have a 2 g sodium restriction diet today.  N.p.o. after midnight.        Recommendations:  N.p.o. after midnight.  Okay for 2 g sodium restriction diet today.  Agree with octreotide, ceftriaxone, transfusion.  EGD in am

## 2023-11-05 NOTE — ED NOTES
Patient bedside report to Eve JEONG, transferred to [id number] on monitor, patient AAO X4, respirations even and unlabored on room air in possession of personal belongings.

## 2023-11-05 NOTE — HOSPITAL COURSE
Tyrone Giraldo is a 63 y.o. male with past medical history of hepatocellular carcinoma, esophageal varices, CAD status post stent who presented 11/5/2023 with weakness and fatigue.  History of hepatocellular carcinoma currently following with Dr. Melton.     Symptoms have been going on for the past 5 days along with constipation.  He has seen no obvious GI blood loss - no blood in stool and no hematemesis.  Recent admission for melena 8/16 - 8/23, evaluated by GI here and was found to have esophageal varices s/p banding.  He was discharged on carvedilol, he was taking all his medications up until 2-3 weeks ago when he ran out.     S/p EGD on 11/6 noted with trace esophageal varices, no stigmata of recent bleeding.

## 2023-11-05 NOTE — ASSESSMENT & PLAN NOTE
Comes in with significant anemia, hemoglobin on presentation 5.9.  History of esophageal varices and melena in the past  S/p banding 08/2023 and has been on carvedilol up until a few weeks ago after running out of medications    S/p 2 unit PRBC transfusion    S/p EGD on 11/6 noted with trace esophageal varices, no stigmata of recent bleeding.  Ceftriaxone, pantoprazole, - octreotide drip completed  Case discussed with gastroenterology   See above  Repeat cbc in am

## 2023-11-05 NOTE — ED NOTES
Med rec complete per patient  Allergies reviewed.   Patient denies any outpatient antibiotics in the last 30 days.   Anticoagulants taken in the last 14 days? No    Preferred pharmacy for this visit - [facility]

## 2023-11-05 NOTE — ED NOTES
Bedside report received from triage RN. Assumed patient care. Verified patient identification. Connected to monitor,  with unlabored respirations. Discussed plan of care.   Gurney in low position, side rail up for pt safety. Call light within reach. Standard fall precautions in place.

## 2023-11-05 NOTE — PROGRESS NOTES
4 Eyes Skin Assessment Completed by ADENIKE Coyle and ADENIKE Dickerson.    Head WDL  Ears WDL  Nose WDL  Mouth WDL  Neck WDL  Breast/Chest WDL  Shoulder Blades WDL  Spine WDL  (R) Arm/Elbow/Hand WDL  (L) Arm/Elbow/Hand WDL  Abdomen WDL  Groin WDL  Scrotum/Coccyx/Buttocks Redness and Blanching  (R) Leg Edema  (L) Leg Edema  (R) Heel/Foot/Toe Redness and Blanching  (L) Heel/Foot/Toe Redness and Blanching          Devices In Places Tele Box, Pulse Ox, and Nasal Cannula      Interventions In Place Pillows    Possible Skin Injury No    Pictures Uploaded Into Epic N/A  Wound Consult Placed N/A  RN Wound Prevention Protocol Ordered No

## 2023-11-05 NOTE — CARE PLAN
Problem: Pain - Standard  Goal: Alleviation of pain or a reduction in pain to the patient’s comfort goal  Outcome: Progressing  Note: No pain was reported during rounding     Problem: Knowledge Deficit - Standard  Goal: Patient and family/care givers will demonstrate understanding of plan of care, disease process/condition, diagnostic tests and medications  Outcome: Progressing  Note: Patient verbalized plan of care after discussion      Problem: Fall Risk  Goal: Patient will remain free from falls  Outcome: Progressing  Note: Call light within reach and bed alarm kept on     Problem: Hemodynamics  Goal: Patient's hemodynamics, fluid balance and neurologic status will be stable or improve  Outcome: Progressing  Note: Blood transfusion was performed as per order   The patient is Watcher - Medium risk of patient condition declining or worsening    Shift Goals  Clinical Goals: Monitor VS; labs and transfuse blood  Patient Goals: get better and eat    Progress made toward(s) clinical / shift goals:   patient remains free from fall, /64   Pulse 82   Temp 37 °C (98.6 °F) (Temporal)   Resp 17   Ht 1.829 m (6')   Wt 76.3 kg (168 lb 3.4 oz)   SpO2 99%

## 2023-11-06 ENCOUNTER — ANESTHESIA (OUTPATIENT)
Dept: SURGERY | Facility: MEDICAL CENTER | Age: 63
DRG: 812 | End: 2023-11-06
Payer: MEDICARE

## 2023-11-06 ENCOUNTER — ANESTHESIA EVENT (OUTPATIENT)
Dept: SURGERY | Facility: MEDICAL CENTER | Age: 63
DRG: 812 | End: 2023-11-06
Payer: MEDICARE

## 2023-11-06 LAB
ALBUMIN SERPL BCP-MCNC: 2.6 G/DL (ref 3.2–4.9)
ALBUMIN/GLOB SERPL: 0.9 G/DL
ALP SERPL-CCNC: 73 U/L (ref 30–99)
ALT SERPL-CCNC: 40 U/L (ref 2–50)
ANION GAP SERPL CALC-SCNC: 8 MMOL/L (ref 7–16)
AST SERPL-CCNC: 22 U/L (ref 12–45)
BASOPHILS # BLD AUTO: 0.7 % (ref 0–1.8)
BASOPHILS # BLD AUTO: 0.8 % (ref 0–1.8)
BASOPHILS # BLD AUTO: 0.9 % (ref 0–1.8)
BASOPHILS # BLD: 0.03 K/UL (ref 0–0.12)
BASOPHILS # BLD: 0.04 K/UL (ref 0–0.12)
BASOPHILS # BLD: 0.04 K/UL (ref 0–0.12)
BILIRUB SERPL-MCNC: 1.2 MG/DL (ref 0.1–1.5)
BUN SERPL-MCNC: 9 MG/DL (ref 8–22)
CALCIUM ALBUM COR SERPL-MCNC: 8.5 MG/DL (ref 8.5–10.5)
CALCIUM SERPL-MCNC: 7.4 MG/DL (ref 8.5–10.5)
CHLORIDE SERPL-SCNC: 103 MMOL/L (ref 96–112)
CO2 SERPL-SCNC: 24 MMOL/L (ref 20–33)
CREAT SERPL-MCNC: 0.72 MG/DL (ref 0.5–1.4)
EOSINOPHIL # BLD AUTO: 0.1 K/UL (ref 0–0.51)
EOSINOPHIL # BLD AUTO: 0.12 K/UL (ref 0–0.51)
EOSINOPHIL # BLD AUTO: 0.16 K/UL (ref 0–0.51)
EOSINOPHIL NFR BLD: 2.3 % (ref 0–6.9)
EOSINOPHIL NFR BLD: 2.7 % (ref 0–6.9)
EOSINOPHIL NFR BLD: 3.2 % (ref 0–6.9)
ERYTHROCYTE [DISTWIDTH] IN BLOOD BY AUTOMATED COUNT: 52.6 FL (ref 35.9–50)
ERYTHROCYTE [DISTWIDTH] IN BLOOD BY AUTOMATED COUNT: 53.4 FL (ref 35.9–50)
ERYTHROCYTE [DISTWIDTH] IN BLOOD BY AUTOMATED COUNT: 54.9 FL (ref 35.9–50)
GFR SERPLBLD CREATININE-BSD FMLA CKD-EPI: 101 ML/MIN/1.73 M 2
GLOBULIN SER CALC-MCNC: 2.9 G/DL (ref 1.9–3.5)
GLUCOSE SERPL-MCNC: 167 MG/DL (ref 65–99)
HCT VFR BLD AUTO: 25.1 % (ref 42–52)
HCT VFR BLD AUTO: 25.6 % (ref 42–52)
HCT VFR BLD AUTO: 25.6 % (ref 42–52)
HCT VFR BLD AUTO: 26.6 % (ref 42–52)
HGB BLD-MCNC: 7.4 G/DL (ref 14–18)
HGB BLD-MCNC: 7.5 G/DL (ref 14–18)
HGB BLD-MCNC: 7.6 G/DL (ref 14–18)
HGB BLD-MCNC: 8 G/DL (ref 14–18)
IMM GRANULOCYTES # BLD AUTO: 0.01 K/UL (ref 0–0.11)
IMM GRANULOCYTES # BLD AUTO: 0.01 K/UL (ref 0–0.11)
IMM GRANULOCYTES # BLD AUTO: 0.02 K/UL (ref 0–0.11)
IMM GRANULOCYTES NFR BLD AUTO: 0.2 % (ref 0–0.9)
IMM GRANULOCYTES NFR BLD AUTO: 0.2 % (ref 0–0.9)
IMM GRANULOCYTES NFR BLD AUTO: 0.4 % (ref 0–0.9)
LYMPHOCYTES # BLD AUTO: 0.63 K/UL (ref 1–4.8)
LYMPHOCYTES # BLD AUTO: 0.85 K/UL (ref 1–4.8)
LYMPHOCYTES # BLD AUTO: 0.86 K/UL (ref 1–4.8)
LYMPHOCYTES NFR BLD: 14.4 % (ref 22–41)
LYMPHOCYTES NFR BLD: 17.3 % (ref 22–41)
LYMPHOCYTES NFR BLD: 19.3 % (ref 22–41)
MCH RBC QN AUTO: 24.1 PG (ref 27–33)
MCH RBC QN AUTO: 24.4 PG (ref 27–33)
MCH RBC QN AUTO: 24.8 PG (ref 27–33)
MCHC RBC AUTO-ENTMCNC: 28.9 G/DL (ref 32.3–36.5)
MCHC RBC AUTO-ENTMCNC: 29.3 G/DL (ref 32.3–36.5)
MCHC RBC AUTO-ENTMCNC: 30.3 G/DL (ref 32.3–36.5)
MCV RBC AUTO: 82 FL (ref 81.4–97.8)
MCV RBC AUTO: 83.4 FL (ref 81.4–97.8)
MCV RBC AUTO: 83.4 FL (ref 81.4–97.8)
MONOCYTES # BLD AUTO: 0.42 K/UL (ref 0–0.85)
MONOCYTES # BLD AUTO: 0.55 K/UL (ref 0–0.85)
MONOCYTES # BLD AUTO: 0.6 K/UL (ref 0–0.85)
MONOCYTES NFR BLD AUTO: 12.1 % (ref 0–13.4)
MONOCYTES NFR BLD AUTO: 12.5 % (ref 0–13.4)
MONOCYTES NFR BLD AUTO: 9.6 % (ref 0–13.4)
NEUTROPHILS # BLD AUTO: 2.87 K/UL (ref 1.82–7.42)
NEUTROPHILS # BLD AUTO: 3.15 K/UL (ref 1.82–7.42)
NEUTROPHILS # BLD AUTO: 3.29 K/UL (ref 1.82–7.42)
NEUTROPHILS NFR BLD: 65 % (ref 44–72)
NEUTROPHILS NFR BLD: 66.2 % (ref 44–72)
NEUTROPHILS NFR BLD: 72.2 % (ref 44–72)
NRBC # BLD AUTO: 0 K/UL
NRBC BLD-RTO: 0 /100 WBC (ref 0–0.2)
PLATELET # BLD AUTO: 100 K/UL (ref 164–446)
PLATELET # BLD AUTO: 110 K/UL (ref 164–446)
PLATELET # BLD AUTO: 97 K/UL (ref 164–446)
PLATELETS.RETICULATED NFR BLD AUTO: 16.2 % (ref 0.6–13.1)
PLATELETS.RETICULATED NFR BLD AUTO: 17.9 % (ref 0.6–13.1)
PLATELETS.RETICULATED NFR BLD AUTO: 20.3 % (ref 0.6–13.1)
PMV BLD AUTO: 11.8 FL (ref 9–12.9)
PMV BLD AUTO: 12.3 FL (ref 9–12.9)
POTASSIUM SERPL-SCNC: 4.2 MMOL/L (ref 3.6–5.5)
PROT SERPL-MCNC: 5.5 G/DL (ref 6–8.2)
RBC # BLD AUTO: 3.06 M/UL (ref 4.7–6.1)
RBC # BLD AUTO: 3.07 M/UL (ref 4.7–6.1)
RBC # BLD AUTO: 3.07 M/UL (ref 4.7–6.1)
SODIUM SERPL-SCNC: 135 MMOL/L (ref 135–145)
WBC # BLD AUTO: 4.4 K/UL (ref 4.8–10.8)
WBC # BLD AUTO: 4.4 K/UL (ref 4.8–10.8)
WBC # BLD AUTO: 5 K/UL (ref 4.8–10.8)

## 2023-11-06 PROCEDURE — 43235 EGD DIAGNOSTIC BRUSH WASH: CPT | Performed by: INTERNAL MEDICINE

## 2023-11-06 PROCEDURE — 99233 SBSQ HOSP IP/OBS HIGH 50: CPT | Mod: 25 | Performed by: STUDENT IN AN ORGANIZED HEALTH CARE EDUCATION/TRAINING PROGRAM

## 2023-11-06 PROCEDURE — 700102 HCHG RX REV CODE 250 W/ 637 OVERRIDE(OP): Performed by: STUDENT IN AN ORGANIZED HEALTH CARE EDUCATION/TRAINING PROGRAM

## 2023-11-06 PROCEDURE — 80053 COMPREHEN METABOLIC PANEL: CPT

## 2023-11-06 PROCEDURE — 85018 HEMOGLOBIN: CPT

## 2023-11-06 PROCEDURE — 770001 HCHG ROOM/CARE - MED/SURG/GYN PRIV*

## 2023-11-06 PROCEDURE — 85025 COMPLETE CBC W/AUTO DIFF WBC: CPT

## 2023-11-06 PROCEDURE — 700111 HCHG RX REV CODE 636 W/ 250 OVERRIDE (IP): Performed by: STUDENT IN AN ORGANIZED HEALTH CARE EDUCATION/TRAINING PROGRAM

## 2023-11-06 PROCEDURE — A9270 NON-COVERED ITEM OR SERVICE: HCPCS | Performed by: STUDENT IN AN ORGANIZED HEALTH CARE EDUCATION/TRAINING PROGRAM

## 2023-11-06 PROCEDURE — 700101 HCHG RX REV CODE 250: Performed by: STUDENT IN AN ORGANIZED HEALTH CARE EDUCATION/TRAINING PROGRAM

## 2023-11-06 PROCEDURE — 85014 HEMATOCRIT: CPT

## 2023-11-06 PROCEDURE — 30233N1 TRANSFUSION OF NONAUTOLOGOUS RED BLOOD CELLS INTO PERIPHERAL VEIN, PERCUTANEOUS APPROACH: ICD-10-PCS | Performed by: INTERNAL MEDICINE

## 2023-11-06 PROCEDURE — 0DJ08ZZ INSPECTION OF UPPER INTESTINAL TRACT, VIA NATURAL OR ARTIFICIAL OPENING ENDOSCOPIC: ICD-10-PCS

## 2023-11-06 PROCEDURE — C9113 INJ PANTOPRAZOLE SODIUM, VIA: HCPCS | Performed by: STUDENT IN AN ORGANIZED HEALTH CARE EDUCATION/TRAINING PROGRAM

## 2023-11-06 PROCEDURE — 160002 HCHG RECOVERY MINUTES (STAT): Performed by: INTERNAL MEDICINE

## 2023-11-06 PROCEDURE — 700105 HCHG RX REV CODE 258: Performed by: STUDENT IN AN ORGANIZED HEALTH CARE EDUCATION/TRAINING PROGRAM

## 2023-11-06 PROCEDURE — 85055 RETICULATED PLATELET ASSAY: CPT | Mod: 91

## 2023-11-06 PROCEDURE — 36415 COLL VENOUS BLD VENIPUNCTURE: CPT

## 2023-11-06 PROCEDURE — 160009 HCHG ANES TIME/MIN: Performed by: INTERNAL MEDICINE

## 2023-11-06 PROCEDURE — 160202 HCHG ENDO MINUTES - 1ST 30 MINS LEVEL 3: Performed by: INTERNAL MEDICINE

## 2023-11-06 PROCEDURE — 160035 HCHG PACU - 1ST 60 MINS PHASE I: Performed by: INTERNAL MEDICINE

## 2023-11-06 PROCEDURE — 160048 HCHG OR STATISTICAL LEVEL 1-5: Performed by: INTERNAL MEDICINE

## 2023-11-06 PROCEDURE — 99406 BEHAV CHNG SMOKING 3-10 MIN: CPT | Performed by: STUDENT IN AN ORGANIZED HEALTH CARE EDUCATION/TRAINING PROGRAM

## 2023-11-06 RX ORDER — ONDANSETRON 2 MG/ML
4 INJECTION INTRAMUSCULAR; INTRAVENOUS
Status: DISCONTINUED | OUTPATIENT
Start: 2023-11-06 | End: 2023-11-06 | Stop reason: HOSPADM

## 2023-11-06 RX ORDER — HYDROMORPHONE HYDROCHLORIDE 1 MG/ML
0.4 INJECTION, SOLUTION INTRAMUSCULAR; INTRAVENOUS; SUBCUTANEOUS
Status: DISCONTINUED | OUTPATIENT
Start: 2023-11-06 | End: 2023-11-06 | Stop reason: HOSPADM

## 2023-11-06 RX ORDER — SODIUM CHLORIDE, SODIUM LACTATE, POTASSIUM CHLORIDE, CALCIUM CHLORIDE 600; 310; 30; 20 MG/100ML; MG/100ML; MG/100ML; MG/100ML
INJECTION, SOLUTION INTRAVENOUS CONTINUOUS
Status: DISCONTINUED | OUTPATIENT
Start: 2023-11-06 | End: 2023-11-06 | Stop reason: HOSPADM

## 2023-11-06 RX ORDER — LIDOCAINE HYDROCHLORIDE 20 MG/ML
INJECTION, SOLUTION EPIDURAL; INFILTRATION; INTRACAUDAL; PERINEURAL PRN
Status: DISCONTINUED | OUTPATIENT
Start: 2023-11-06 | End: 2023-11-06 | Stop reason: SURG

## 2023-11-06 RX ORDER — HYDROMORPHONE HYDROCHLORIDE 1 MG/ML
0.2 INJECTION, SOLUTION INTRAMUSCULAR; INTRAVENOUS; SUBCUTANEOUS
Status: DISCONTINUED | OUTPATIENT
Start: 2023-11-06 | End: 2023-11-06 | Stop reason: HOSPADM

## 2023-11-06 RX ORDER — SODIUM CHLORIDE, SODIUM LACTATE, POTASSIUM CHLORIDE, CALCIUM CHLORIDE 600; 310; 30; 20 MG/100ML; MG/100ML; MG/100ML; MG/100ML
INJECTION, SOLUTION INTRAVENOUS
Status: DISCONTINUED | OUTPATIENT
Start: 2023-11-06 | End: 2023-11-06 | Stop reason: SURG

## 2023-11-06 RX ORDER — SODIUM CHLORIDE, SODIUM LACTATE, POTASSIUM CHLORIDE, CALCIUM CHLORIDE 600; 310; 30; 20 MG/100ML; MG/100ML; MG/100ML; MG/100ML
INJECTION, SOLUTION INTRAVENOUS CONTINUOUS
Status: ACTIVE | OUTPATIENT
Start: 2023-11-06 | End: 2023-11-06

## 2023-11-06 RX ORDER — DIPHENHYDRAMINE HYDROCHLORIDE 50 MG/ML
12.5 INJECTION INTRAMUSCULAR; INTRAVENOUS
Status: DISCONTINUED | OUTPATIENT
Start: 2023-11-06 | End: 2023-11-06 | Stop reason: HOSPADM

## 2023-11-06 RX ORDER — EPHEDRINE SULFATE 50 MG/ML
5 INJECTION, SOLUTION INTRAVENOUS
Status: DISCONTINUED | OUTPATIENT
Start: 2023-11-06 | End: 2023-11-06 | Stop reason: HOSPADM

## 2023-11-06 RX ORDER — OXYCODONE HCL 5 MG/5 ML
10 SOLUTION, ORAL ORAL
Status: DISCONTINUED | OUTPATIENT
Start: 2023-11-06 | End: 2023-11-06 | Stop reason: HOSPADM

## 2023-11-06 RX ORDER — MIDODRINE HYDROCHLORIDE 5 MG/1
5 TABLET ORAL
Status: DISCONTINUED | OUTPATIENT
Start: 2023-11-06 | End: 2023-11-09 | Stop reason: HOSPADM

## 2023-11-06 RX ORDER — HYDRALAZINE HYDROCHLORIDE 20 MG/ML
5 INJECTION INTRAMUSCULAR; INTRAVENOUS
Status: DISCONTINUED | OUTPATIENT
Start: 2023-11-06 | End: 2023-11-06 | Stop reason: HOSPADM

## 2023-11-06 RX ORDER — HYDROMORPHONE HYDROCHLORIDE 1 MG/ML
0.1 INJECTION, SOLUTION INTRAMUSCULAR; INTRAVENOUS; SUBCUTANEOUS
Status: DISCONTINUED | OUTPATIENT
Start: 2023-11-06 | End: 2023-11-06 | Stop reason: HOSPADM

## 2023-11-06 RX ORDER — OXYCODONE HCL 5 MG/5 ML
5 SOLUTION, ORAL ORAL
Status: DISCONTINUED | OUTPATIENT
Start: 2023-11-06 | End: 2023-11-06 | Stop reason: HOSPADM

## 2023-11-06 RX ORDER — HALOPERIDOL 5 MG/ML
1 INJECTION INTRAMUSCULAR
Status: DISCONTINUED | OUTPATIENT
Start: 2023-11-06 | End: 2023-11-06 | Stop reason: HOSPADM

## 2023-11-06 RX ORDER — SODIUM CHLORIDE 9 MG/ML
1000 INJECTION, SOLUTION INTRAVENOUS ONCE
Status: COMPLETED | OUTPATIENT
Start: 2023-11-06 | End: 2023-11-06

## 2023-11-06 RX ADMIN — MIDODRINE HYDROCHLORIDE 5 MG: 5 TABLET ORAL at 17:17

## 2023-11-06 RX ADMIN — PANTOPRAZOLE SODIUM 40 MG: 40 INJECTION, POWDER, FOR SOLUTION INTRAVENOUS at 05:28

## 2023-11-06 RX ADMIN — PROPOFOL 20 MG: 10 INJECTION, EMULSION INTRAVENOUS at 12:59

## 2023-11-06 RX ADMIN — SODIUM CHLORIDE, POTASSIUM CHLORIDE, SODIUM LACTATE AND CALCIUM CHLORIDE: 600; 310; 30; 20 INJECTION, SOLUTION INTRAVENOUS at 12:51

## 2023-11-06 RX ADMIN — PROPOFOL 80 MG: 10 INJECTION, EMULSION INTRAVENOUS at 12:53

## 2023-11-06 RX ADMIN — PROPOFOL 20 MG: 10 INJECTION, EMULSION INTRAVENOUS at 12:57

## 2023-11-06 RX ADMIN — OCTREOTIDE ACETATE 50 MCG/HR: 200 INJECTION, SOLUTION INTRAVENOUS; SUBCUTANEOUS at 09:29

## 2023-11-06 RX ADMIN — PROPOFOL 20 MG: 10 INJECTION, EMULSION INTRAVENOUS at 12:55

## 2023-11-06 RX ADMIN — NICOTINE 7 MG: 7 PATCH, EXTENDED RELEASE TRANSDERMAL at 05:29

## 2023-11-06 RX ADMIN — CEFTRIAXONE SODIUM 2000 MG: 10 INJECTION, POWDER, FOR SOLUTION INTRAVENOUS at 05:29

## 2023-11-06 RX ADMIN — SODIUM CHLORIDE 1000 ML: 9 INJECTION, SOLUTION INTRAVENOUS at 14:30

## 2023-11-06 RX ADMIN — PROPOFOL 20 MG: 10 INJECTION, EMULSION INTRAVENOUS at 13:01

## 2023-11-06 RX ADMIN — DOCUSATE SODIUM 50 MG AND SENNOSIDES 8.6 MG 2 TABLET: 8.6; 5 TABLET, FILM COATED ORAL at 17:17

## 2023-11-06 RX ADMIN — LIDOCAINE HYDROCHLORIDE 50 MG: 20 INJECTION, SOLUTION EPIDURAL; INFILTRATION; INTRACAUDAL at 12:53

## 2023-11-06 RX ADMIN — DOCUSATE SODIUM 50 MG AND SENNOSIDES 8.6 MG 2 TABLET: 8.6; 5 TABLET, FILM COATED ORAL at 05:28

## 2023-11-06 RX ADMIN — PANTOPRAZOLE SODIUM 40 MG: 40 INJECTION, POWDER, FOR SOLUTION INTRAVENOUS at 17:17

## 2023-11-06 ASSESSMENT — PAIN DESCRIPTION - PAIN TYPE
TYPE: SURGICAL PAIN

## 2023-11-06 ASSESSMENT — ENCOUNTER SYMPTOMS
NAUSEA: 0
DIZZINESS: 1
BLOOD IN STOOL: 0
VOMITING: 0
CONSTIPATION: 0

## 2023-11-06 ASSESSMENT — FIBROSIS 4 INDEX: FIB4 SCORE: 2.02

## 2023-11-06 ASSESSMENT — PAIN SCALES - GENERAL: PAIN_LEVEL: 1

## 2023-11-06 NOTE — PROGRESS NOTES
Hospital Medicine Daily Progress Note    Date of Service  11/6/2023    Chief Complaint  Tyrone Giraldo is a 63 y.o. male admitted 11/5/2023 with fatigue    Hospital Course  Tyrone Giraldo is a 63 y.o. male with past medical history of hepatocellular carcinoma, esophageal varices, CAD status post stent who presented 11/5/2023 with weakness and fatigue.  History of hepatocellular carcinoma currently following with Dr. Melton.    Symptoms have been going on for the past 5 days along with constipation.  He has seen no obvious GI blood loss - no blood in stool and no hematemesis.  Recent admission for melena 8/16 - 8/23, evaluated by GI here and was found to have esophageal varices s/p banding.  He was discharged on carvedilol, he was taking all his medications up until 2-3 weeks ago when he ran out.     S/p EGD on 11/6 noted with trace esophageal varices, no stigmata of recent bleeding.    Interval Problem Update      11/6/2023  Blood pressure borderline low  Asymptomatic  S/p 2 unit PRBC transfusion  CT abdomen noted cirrhosis, hepatic mass.    Hemoglobin stable around 8    Reports of being constipated, denies hematemesis/melena    S/p EGD on 11/6 noted with trace esophageal varices, no stigmata of recent bleeding.    Case discussed with gastroenterology Dr. Og.    Monitor H&H closely  Continue Protonix  Continue on octreotide  Continue on IV fluid  GI following    Need close monitoring of blood counts, electrolytes and renal function due to potential of organ dysfunction due to blood loss anemia.  Requiring IV Protonix, IV octreotide.  High risk of deterioration need to be monitor closely under telemetry .            I have discussed this patient's plan of care and discharge plan at IDT rounds today with Case Management, Nursing, Nursing leadership, and other members of the IDT team.    Consultants/Specialty  GI    Code Status  Full Code    Disposition  The patient is not medically cleared for  discharge to home or a post-acute facility.  Anticipate discharge to: home with close outpatient follow-up    I have placed the appropriate orders for post-discharge needs.    Review of Systems  Review of Systems   Constitutional:  Positive for malaise/fatigue.   Gastrointestinal:  Positive for melena. Negative for blood in stool, constipation, nausea and vomiting.   Neurological:  Positive for dizziness.        Physical Exam  Temp:  [36.2 °C (97.1 °F)-37.1 °C (98.8 °F)] 36.2 °C (97.1 °F)  Pulse:  [60-91] 72  Resp:  [17-21] 21  BP: ()/(46-65) 84/54  SpO2:  [92 %-99 %] 92 %    Physical Exam  Constitutional:       Appearance: He is ill-appearing.   Cardiovascular:      Rate and Rhythm: Normal rate and regular rhythm.      Pulses: Normal pulses.      Heart sounds: Normal heart sounds.   Pulmonary:      Effort: Pulmonary effort is normal.   Abdominal:      General: There is no distension.   Musculoskeletal:      Right lower leg: No edema.      Left lower leg: No edema.   Skin:     General: Skin is warm.      Capillary Refill: Capillary refill takes less than 2 seconds.   Neurological:      Mental Status: He is oriented to person, place, and time. Mental status is at baseline.         Fluids    Intake/Output Summary (Last 24 hours) at 11/6/2023 1348  Last data filed at 11/6/2023 1306  Gross per 24 hour   Intake 1053.33 ml   Output 1740 ml   Net -686.67 ml       Laboratory  Recent Labs     11/05/23  0318 11/05/23  0908 11/05/23  1800 11/06/23  0036 11/06/23  0614   WBC 7.3  --   --  5.0  --    RBC 2.60*  --   --  3.07*  --    HEMOGLOBIN 5.9*   < > 6.4* 7.5* 8.0*   HEMATOCRIT 20.4*   < > 20.8* 25.6* 26.6*   MCV 78.5*  --   --  83.4  --    MCH 22.7*  --   --  24.4*  --    MCHC 28.9*  --   --  29.3*  --    RDW 52.0*  --   --  54.9*  --    PLATELETCT 128*  --   --  110*  --    MPV 12.8  --   --   --   --     < > = values in this interval not displayed.     Recent Labs     11/05/23  0318 11/05/23  1456 11/06/23  0036    SODIUM 131* 131* 135   POTASSIUM 3.0* 3.7 4.2   CHLORIDE 97 99 103   CO2 24 23 24   GLUCOSE 160* 142* 167*   BUN 9 8 9   CREATININE 0.78 0.78 0.72   CALCIUM 7.7* 7.6* 7.4*     Recent Labs     11/05/23  0908   INR 1.14*               Imaging  CT-ABDOMEN-PELVIS WITH   Final Result      1.  Cirrhosis.   2.  Redemonstration of a hepatic segment 6 mass, likely HCC. Progression of disease with likely metastatic disease to the more inferior aspect of segment 6 and the right adrenal gland.   3.  Increased size of the upper abdominal lymphadenopathy, likely metastatic.   4.  Sequela of portal hypertension including gastric varices, trace perihepatic varices, and splenomegaly.   5.  Multiple other low-density hepatic lesions, cysts versus metastatic disease.   6.  Cholelithiasis with thickened wall, possibly reactive. Consider ultrasound as indicated.           Assessment/Plan  * Acute blood loss anemia- (present on admission)  Assessment & Plan  History of cirrhosis secondary to hepatocellular carcinoma and known esophageal varices, banding on last admission 08/2023  1 unit PRBC pending.   Trend Hgb including post transfusion, transfuse if less than 7    Constipation  Assessment & Plan  No reported obstruction or inflammation on CT abdomen  Non-tender, no nausea  Bowel protocol      Homelessness- (present on admission)  Assessment & Plan  Meds to bed on discharge    Hypokalemia- (present on admission)  Assessment & Plan  Resolved    Tobacco abuse- (present on admission)  Assessment & Plan  I spent nearly  4 minutes on Tobacco cessation education and counseling.   I Discussed the benefits of quitting smoking and risks of continued smoking including cardiovascular disease, cancer and COPD.   Discussed options of nicotine patch.  Nicotine patch ordered    Secondary esophageal varices with bleeding (HCC)- (present on admission)  Assessment & Plan  Comes in with significant anemia, hemoglobin on presentation 5.9.  History of  esophageal varices and melena in the past  S/p banding 08/2023 and has been on carvedilol up until a few weeks ago after running out of medications    11/6/2023    S/p 2 unit PRBC transfusion  Hemoglobin stable around 8  S/p EGD on 11/6 noted with trace esophageal varices, no stigmata of recent bleeding.  Ceftriaxone, octreotide drip, pantoprazole    Other cirrhosis of liver (HCC)- (present on admission)  Assessment & Plan  Secondary to hepatocellular carcinoma    Hepatocellular carcinoma (HCC)- (present on admission)  Assessment & Plan  Currently following with Dr. Melton.    Surgery for his hepatocellular carcinoma has not been planned yet as patient does not have stable housing, which he is working to remedy    History of MI (myocardial infarction)- (present on admission)  Assessment & Plan  Stents in 2006 and 2009.  Has not been on aspirin for the past few weeks. Hold off for now due to acute blood loss anemia         VTE prophylaxis: SCD    I have performed a physical exam and reviewed and updated ROS and Plan today (11/6/2023). In review of yesterday's note (11/5/2023), there are no changes except as documented above.

## 2023-11-06 NOTE — DISCHARGE PLANNING
Pt going down for EGD.   CHW will attempt to introduce services to the patient again.   Pt homeless.

## 2023-11-06 NOTE — OR NURSING
1305 - Pt to PACU 5 from OR. Bedside report from anesthesiologist and RN.  Attached to monitoring, VSS, breathing is calm and unlabored on 6L oxymask.     1314- Dr. Og at bedside     1334- Called and gave report to ADENIKE Chambers. VSS on RA, no signs of pain or nausea from pt. Pt placed on transport.     1344- Pt transferred back to room in [facility], VSS on RA. Transferred by transport.

## 2023-11-06 NOTE — ANESTHESIA POSTPROCEDURE EVALUATION
Patient: Tyrone Giraldo    Procedure Summary     Date: 11/06/23 Room / Location: [facility] ROOM 26 / SURGERY SAME DAY [facility]    Anesthesia Start: 1251 Anesthesia Stop: 1306    Procedure: GASTROSCOPY (Esophagus) Diagnosis: (gastric varix, esophageal varices, food in stomach)    Surgeons: Javier Og M.D. Responsible Provider: Prince Dong D.O.    Anesthesia Type: general ASA Status: 3          Final Anesthesia Type: general  Last vitals  BP   Blood Pressure: 91/55    Temp   36.7 °C (98.1 °F)    Pulse   72   Resp   19    SpO2   92 %      Anesthesia Post Evaluation    Patient location during evaluation: PACU  Patient participation: complete - patient participated  Level of consciousness: awake and alert  Pain score: 1    Airway patency: patent  Anesthetic complications: no  Cardiovascular status: hemodynamically stable  Respiratory status: acceptable  Hydration status: euvolemic    PONV: none          No notable events documented.     Nurse Pain Score: 0 (NPRS)

## 2023-11-06 NOTE — PROGRESS NOTES
Back from [facility]. Report received. Pt A+O x 4. Monitoring VS per protocol. BP noted to bed low. Discussed pt's care with Hospitalist.

## 2023-11-06 NOTE — DIETARY
Nutrition services: Day 1 of admit.  Tyrone Giraldo is a 63 y.o. male with admitting DX of Acute blood loss anemia    Consult received for MST of 2 on nutrition screen due to report of 2-13 lb wt loss x 3 months and poor PO PTA.      Assessment:  Height: 182.9 cm (6')  Weight: 74.3 kg (163 lb 12.8 oz)  Body mass index is 22.22 kg/m²., BMI classification: WNL  Diet/Intake: 2 gram sodium diet/ % of breakfast on 11/5    Evaluation:   History of hepatocellular carcinoma, esophageal varices, CAD status post stent. Pt w/ EGD today w/ trace esophageal varices. Pt currently with constipation. He is homeless.  Pt in room when RD visited. Pt reports wt loss of 20 lb (11%) x 3 months. Wt loss is significant. He said that his PO PTA was not poor. No changes in intake noted. RD questions if wt loss is r/t fluid shifts due to dx of cirrhosis of liver. Pt does not appear malnourished.   Pt reports that appetite is currently not the best. Pt agreed to addition of supplement TID w/ meals.   Edema: 1+ edema to BLE.    GI: LBM on 10/31  Labs: 11/6/23: glucose=167. 8/17/23: A1c=6.5  Meds: Senna-docusate, PRN laxatives (not given).     Malnutrition Risk: ASPEN criteria not met    Recommendations/Plan:  Add vanilla Ensure to meals TID   Encourage intake of >50%  Document intake of all meals and supplements as % taken in ADL's to provide interdisciplinary communication across all shifts.   Monitor weight.  Nutrition rep will continue to see patient for ongoing meal and snack preferences.     RD will follow.

## 2023-11-06 NOTE — ANESTHESIA PREPROCEDURE EVALUATION
Case: [id number] Date/Time: 11/06/23 1250    Procedure: GASTROSCOPY (Esophagus)    Anesthesia type: MAC    Pre-op diagnosis: Anemia, history of esophageal varices, cirrhosis    Location: [facility] ROOM 26 / SURGERY SAME DAY [facility]    Surgeons: Javier Og M.D.          Relevant Problems   CARDIAC   (positive) Coronary artery disease involving native coronary artery of native heart without angina pectoris   (positive) Secondary esophageal varices with bleeding (HCC)         (positive) Hepatocellular carcinoma (HCC)   (positive) Other cirrhosis of liver (HCC)       Physical Exam    Airway   Mallampati: II  TM distance: >3 FB  Neck ROM: full       Cardiovascular - normal exam  Rhythm: regular  Rate: normal  (-) murmur     Dental - normal exam           Pulmonary   (+) wheezes     Abdominal    Neurological - normal exam                 Anesthesia Plan    ASA 3   ASA physical status 3 criteria: COPD - poorly controlled and CAD/stents (> 3 months)    Plan - general       Airway plan will be natural airway          Induction: intravenous    Postoperative Plan: Postoperative administration of opioids is intended.    Pertinent diagnostic labs and testing reviewed    Informed Consent:    Anesthetic plan and risks discussed with patient.    Use of blood products discussed with: patient whom consented to blood products.

## 2023-11-06 NOTE — ANESTHESIA TIME REPORT
Anesthesia Start and Stop Event Times     Date Time Event    11/6/2023 1132 Ready for Procedure     1251 Anesthesia Start     1306 Anesthesia Stop        Responsible Staff  11/06/23    Name Role Begin End    Prince Dong D.O. Anesth 1251 1306        Overtime Reason:  no overtime (within assigned shift)    Comments:

## 2023-11-06 NOTE — CARE PLAN
The patient is Stable - Low risk of patient condition declining or worsening    Shift Goals  Clinical Goals: Moniotr HGB / Blood Transfusion  Patient Goals: Sleep  Family Goals: deyanira    Progress made toward(s) clinical / shift goals:        Problem: Pain - Standard  Goal: Alleviation of pain or a reduction in pain to the patient’s comfort goal  Outcome: Progressing     Problem: Knowledge Deficit - Standard  Goal: Patient and family/care givers will demonstrate understanding of plan of care, disease process/condition, diagnostic tests and medications  Outcome: Progressing     Problem: Fall Risk  Goal: Patient will remain free from falls  Outcome: Progressing     Problem: Hemodynamics  Goal: Patient's hemodynamics, fluid balance and neurologic status will be stable or improve  Outcome: Progressing     Problem: Respiratory  Goal: Patient will achieve/maintain optimum respiratory ventilation and gas exchange  Outcome: Progressing     Problem: Self Care  Goal: Patient will have the ability to perform ADLs independently or with assistance (bathe, groom, dress, toilet and feed)  Outcome: Progressing       Patient is not progressing towards the following goals:

## 2023-11-06 NOTE — OP REPORT
OPERATIVE REPORT    PATIENT:   Tyrone Giraldo   1960       PREOPERATIVE DIAGNOSES/INDICATIONS: Cirrhosis, anemia, history of esophageal varices    PROCEDURE: Diagnostic EGD    PHYSICIAN:  Javier Og MD     ANESTHESIA:  Per anesthesiologist.  [facility]    [facility]    CONSENT: The risks, benefits and alternatives of the procedure were discussed in detail. The risks include and are not limited to bleeding, infection, perforation, missed lesions, and sedations risks (cardiopulmonary compromise and allergic reaction to medications).    DESCRIPTION:   The patient presented to the operating room.  A time out was performed prior to beginning the procedure.   The patient was placed in the left lateral recumbent position. Patient was sedated by anesthesia: Propofol.    OPERATIVE FINDINGS:    Esophagus: Grade 1/trace esophageal varices in the distal esophagus.  Stomach: Moderate size gastric varix at the cardia.  No stigmata of recent bleeding.  No evidence of blood loss.  No higher stigmata.  Food was present throughout the stomach impairing further visualization.  No blood in the lumen.  Duodenum: Food present.  Difficult to examine.  No bleeding.    Blood loss: None    The patient tolerated the procedure well.      There were no immediate complications.    Pathology samples obtained: None    IMPRESSION:  Trace esophageal varices.  Moderate size isolated gastric varix at the cardia.  No stigmata of recent bleeding.  No higher stigmata.  Food present throughout the stomach.  Limited exam but normal duodenum.    RECOMMENDATIONS:  Advance diet.  2 g sodium restriction.  Observe clinical course.  Return patient to [facility] for continued care.

## 2023-11-06 NOTE — PROGRESS NOTES
Received bedside report from outgoing RN and assumed care of the patient.  Patient was lying in bed watching tv and finishing up his dinner.  Patient is Aox4 with no signs of acute distress. He denied any pain and had no other questions or concerns for me at this time.  Patient reminded to use call light before getting up to which he verbalized understanding.  Bed is in lowest position with call light and personal belongings within reach.  Bed alarm activated as an additional precaution.  Hourly rounding initiated.

## 2023-11-06 NOTE — PROGRESS NOTES
Monitor Summary  Rhythm: NSR  Rate: 80-85  Ectopy: pvc's; coup; bigeminy; trigeminy  .16 / .07 / .39            .

## 2023-11-07 LAB
ALBUMIN SERPL BCP-MCNC: 2.4 G/DL (ref 3.2–4.9)
ALBUMIN/GLOB SERPL: 0.8 G/DL
ALP SERPL-CCNC: 69 U/L (ref 30–99)
ALT SERPL-CCNC: 26 U/L (ref 2–50)
ANION GAP SERPL CALC-SCNC: 8 MMOL/L (ref 7–16)
ANISOCYTOSIS BLD QL SMEAR: ABNORMAL
AST SERPL-CCNC: 17 U/L (ref 12–45)
BASOPHILS # BLD AUTO: 0.7 % (ref 0–1.8)
BASOPHILS # BLD AUTO: 0.9 % (ref 0–1.8)
BASOPHILS # BLD AUTO: 1 % (ref 0–1.8)
BASOPHILS # BLD: 0.03 K/UL (ref 0–0.12)
BASOPHILS # BLD: 0.04 K/UL (ref 0–0.12)
BASOPHILS # BLD: 0.04 K/UL (ref 0–0.12)
BILIRUB SERPL-MCNC: 0.5 MG/DL (ref 0.1–1.5)
BUN SERPL-MCNC: 10 MG/DL (ref 8–22)
CALCIUM ALBUM COR SERPL-MCNC: 8.3 MG/DL (ref 8.5–10.5)
CALCIUM SERPL-MCNC: 7 MG/DL (ref 8.5–10.5)
CHLORIDE SERPL-SCNC: 104 MMOL/L (ref 96–112)
CO2 SERPL-SCNC: 22 MMOL/L (ref 20–33)
COMMENT 1642: NORMAL
CREAT SERPL-MCNC: 0.9 MG/DL (ref 0.5–1.4)
EOSINOPHIL # BLD AUTO: 0.1 K/UL (ref 0–0.51)
EOSINOPHIL # BLD AUTO: 0.13 K/UL (ref 0–0.51)
EOSINOPHIL # BLD AUTO: 0.16 K/UL (ref 0–0.51)
EOSINOPHIL NFR BLD: 2.5 % (ref 0–6.9)
EOSINOPHIL NFR BLD: 3.2 % (ref 0–6.9)
EOSINOPHIL NFR BLD: 3.5 % (ref 0–6.9)
ERYTHROCYTE [DISTWIDTH] IN BLOOD BY AUTOMATED COUNT: 54.1 FL (ref 35.9–50)
ERYTHROCYTE [DISTWIDTH] IN BLOOD BY AUTOMATED COUNT: 54.6 FL (ref 35.9–50)
ERYTHROCYTE [DISTWIDTH] IN BLOOD BY AUTOMATED COUNT: 58.4 FL (ref 35.9–50)
GFR SERPLBLD CREATININE-BSD FMLA CKD-EPI: 95 ML/MIN/1.73 M 2
GLOBULIN SER CALC-MCNC: 3.1 G/DL (ref 1.9–3.5)
GLUCOSE SERPL-MCNC: 142 MG/DL (ref 65–99)
HCT VFR BLD AUTO: 25.1 % (ref 42–52)
HCT VFR BLD AUTO: 25.4 % (ref 42–52)
HCT VFR BLD AUTO: 26.2 % (ref 42–52)
HGB BLD-MCNC: 7.5 G/DL (ref 14–18)
HGB BLD-MCNC: 7.5 G/DL (ref 14–18)
HGB BLD-MCNC: 7.6 G/DL (ref 14–18)
IMM GRANULOCYTES # BLD AUTO: 0.01 K/UL (ref 0–0.11)
IMM GRANULOCYTES # BLD AUTO: 0.02 K/UL (ref 0–0.11)
IMM GRANULOCYTES # BLD AUTO: 0.02 K/UL (ref 0–0.11)
IMM GRANULOCYTES NFR BLD AUTO: 0.2 % (ref 0–0.9)
IMM GRANULOCYTES NFR BLD AUTO: 0.4 % (ref 0–0.9)
IMM GRANULOCYTES NFR BLD AUTO: 0.5 % (ref 0–0.9)
LYMPHOCYTES # BLD AUTO: 0.61 K/UL (ref 1–4.8)
LYMPHOCYTES # BLD AUTO: 0.63 K/UL (ref 1–4.8)
LYMPHOCYTES # BLD AUTO: 0.89 K/UL (ref 1–4.8)
LYMPHOCYTES NFR BLD: 13.9 % (ref 22–41)
LYMPHOCYTES NFR BLD: 14.8 % (ref 22–41)
LYMPHOCYTES NFR BLD: 22.4 % (ref 22–41)
MCH RBC QN AUTO: 24.8 PG (ref 27–33)
MCH RBC QN AUTO: 24.8 PG (ref 27–33)
MCH RBC QN AUTO: 25.2 PG (ref 27–33)
MCHC RBC AUTO-ENTMCNC: 28.6 G/DL (ref 32.3–36.5)
MCHC RBC AUTO-ENTMCNC: 29.9 G/DL (ref 32.3–36.5)
MCHC RBC AUTO-ENTMCNC: 29.9 G/DL (ref 32.3–36.5)
MCV RBC AUTO: 82.7 FL (ref 81.4–97.8)
MCV RBC AUTO: 83.1 FL (ref 81.4–97.8)
MCV RBC AUTO: 87.9 FL (ref 81.4–97.8)
MICROCYTES BLD QL SMEAR: ABNORMAL
MONOCYTES # BLD AUTO: 0.38 K/UL (ref 0–0.85)
MONOCYTES # BLD AUTO: 0.45 K/UL (ref 0–0.85)
MONOCYTES # BLD AUTO: 0.45 K/UL (ref 0–0.85)
MONOCYTES NFR BLD AUTO: 10 % (ref 0–13.4)
MONOCYTES NFR BLD AUTO: 11.3 % (ref 0–13.4)
MONOCYTES NFR BLD AUTO: 9.2 % (ref 0–13.4)
MORPHOLOGY BLD-IMP: NORMAL
NEUTROPHILS # BLD AUTO: 2.48 K/UL (ref 1.82–7.42)
NEUTROPHILS # BLD AUTO: 2.96 K/UL (ref 1.82–7.42)
NEUTROPHILS # BLD AUTO: 3.22 K/UL (ref 1.82–7.42)
NEUTROPHILS NFR BLD: 62.3 % (ref 44–72)
NEUTROPHILS NFR BLD: 71.3 % (ref 44–72)
NEUTROPHILS NFR BLD: 71.9 % (ref 44–72)
NRBC # BLD AUTO: 0 K/UL
NRBC BLD-RTO: 0 /100 WBC (ref 0–0.2)
OVALOCYTES BLD QL SMEAR: NORMAL
PLATELET # BLD AUTO: 102 K/UL (ref 164–446)
PLATELET # BLD AUTO: 93 K/UL (ref 164–446)
PLATELET # BLD AUTO: 99 K/UL (ref 164–446)
PLATELET BLD QL SMEAR: NORMAL
PLATELETS.RETICULATED NFR BLD AUTO: 16.5 % (ref 0.6–13.1)
PLATELETS.RETICULATED NFR BLD AUTO: 16.5 % (ref 0.6–13.1)
PLATELETS.RETICULATED NFR BLD AUTO: 18 % (ref 0.6–13.1)
PMV BLD AUTO: 13.6 FL (ref 9–12.9)
POIKILOCYTOSIS BLD QL SMEAR: NORMAL
POTASSIUM SERPL-SCNC: 4 MMOL/L (ref 3.6–5.5)
PROT SERPL-MCNC: 5.5 G/DL (ref 6–8.2)
RBC # BLD AUTO: 2.98 M/UL (ref 4.7–6.1)
RBC # BLD AUTO: 3.02 M/UL (ref 4.7–6.1)
RBC # BLD AUTO: 3.07 M/UL (ref 4.7–6.1)
RBC BLD AUTO: PRESENT
SODIUM SERPL-SCNC: 134 MMOL/L (ref 135–145)
WBC # BLD AUTO: 4 K/UL (ref 4.8–10.8)
WBC # BLD AUTO: 4.1 K/UL (ref 4.8–10.8)
WBC # BLD AUTO: 4.5 K/UL (ref 4.8–10.8)

## 2023-11-07 PROCEDURE — 700111 HCHG RX REV CODE 636 W/ 250 OVERRIDE (IP): Mod: JA | Performed by: STUDENT IN AN ORGANIZED HEALTH CARE EDUCATION/TRAINING PROGRAM

## 2023-11-07 PROCEDURE — 700105 HCHG RX REV CODE 258: Performed by: STUDENT IN AN ORGANIZED HEALTH CARE EDUCATION/TRAINING PROGRAM

## 2023-11-07 PROCEDURE — A9270 NON-COVERED ITEM OR SERVICE: HCPCS | Performed by: STUDENT IN AN ORGANIZED HEALTH CARE EDUCATION/TRAINING PROGRAM

## 2023-11-07 PROCEDURE — 700102 HCHG RX REV CODE 250 W/ 637 OVERRIDE(OP): Performed by: STUDENT IN AN ORGANIZED HEALTH CARE EDUCATION/TRAINING PROGRAM

## 2023-11-07 PROCEDURE — 770001 HCHG ROOM/CARE - MED/SURG/GYN PRIV*

## 2023-11-07 PROCEDURE — 36415 COLL VENOUS BLD VENIPUNCTURE: CPT

## 2023-11-07 PROCEDURE — 85055 RETICULATED PLATELET ASSAY: CPT | Mod: 91

## 2023-11-07 PROCEDURE — 99232 SBSQ HOSP IP/OBS MODERATE 35: CPT | Performed by: NURSE PRACTITIONER

## 2023-11-07 PROCEDURE — 80053 COMPREHEN METABOLIC PANEL: CPT

## 2023-11-07 PROCEDURE — C9113 INJ PANTOPRAZOLE SODIUM, VIA: HCPCS | Performed by: STUDENT IN AN ORGANIZED HEALTH CARE EDUCATION/TRAINING PROGRAM

## 2023-11-07 PROCEDURE — 85025 COMPLETE CBC W/AUTO DIFF WBC: CPT

## 2023-11-07 PROCEDURE — 99233 SBSQ HOSP IP/OBS HIGH 50: CPT | Performed by: HOSPITALIST

## 2023-11-07 RX ADMIN — DOCUSATE SODIUM 50 MG AND SENNOSIDES 8.6 MG 2 TABLET: 8.6; 5 TABLET, FILM COATED ORAL at 05:51

## 2023-11-07 RX ADMIN — BISACODYL 10 MG: 10 SUPPOSITORY RECTAL at 11:55

## 2023-11-07 RX ADMIN — POLYETHYLENE GLYCOL 3350 1 PACKET: 17 POWDER, FOR SOLUTION ORAL at 11:55

## 2023-11-07 RX ADMIN — MIDODRINE HYDROCHLORIDE 5 MG: 5 TABLET ORAL at 11:55

## 2023-11-07 RX ADMIN — DOCUSATE SODIUM 50 MG AND SENNOSIDES 8.6 MG 2 TABLET: 8.6; 5 TABLET, FILM COATED ORAL at 16:33

## 2023-11-07 RX ADMIN — NICOTINE 7 MG: 7 PATCH, EXTENDED RELEASE TRANSDERMAL at 05:52

## 2023-11-07 RX ADMIN — PANTOPRAZOLE SODIUM 40 MG: 40 INJECTION, POWDER, FOR SOLUTION INTRAVENOUS at 16:33

## 2023-11-07 RX ADMIN — OCTREOTIDE ACETATE 50 MCG/HR: 200 INJECTION, SOLUTION INTRAVENOUS; SUBCUTANEOUS at 08:52

## 2023-11-07 RX ADMIN — MIDODRINE HYDROCHLORIDE 5 MG: 5 TABLET ORAL at 08:52

## 2023-11-07 RX ADMIN — CEFTRIAXONE SODIUM 2000 MG: 10 INJECTION, POWDER, FOR SOLUTION INTRAVENOUS at 05:54

## 2023-11-07 RX ADMIN — PANTOPRAZOLE SODIUM 40 MG: 40 INJECTION, POWDER, FOR SOLUTION INTRAVENOUS at 05:53

## 2023-11-07 ASSESSMENT — ENCOUNTER SYMPTOMS
BLOOD IN STOOL: 0
PALPITATIONS: 0
WEIGHT LOSS: 0
FOCAL WEAKNESS: 0
DIAPHORESIS: 0
SORE THROAT: 0
CONSTIPATION: 1
SHORTNESS OF BREATH: 0
DEPRESSION: 0
BACK PAIN: 0
RESPIRATORY NEGATIVE: 1
NAUSEA: 0
PSYCHIATRIC NEGATIVE: 1
HEADACHES: 0
BRUISES/BLEEDS EASILY: 0
VOMITING: 0
DIARRHEA: 0
CHILLS: 0
CONSTIPATION: 0
ABDOMINAL PAIN: 0
EYES NEGATIVE: 1
CARDIOVASCULAR NEGATIVE: 1
COUGH: 0
HEARTBURN: 0
MYALGIAS: 0
WEAKNESS: 0
DIZZINESS: 0
MUSCULOSKELETAL NEGATIVE: 1
BLURRED VISION: 0
FEVER: 0

## 2023-11-07 ASSESSMENT — COGNITIVE AND FUNCTIONAL STATUS - GENERAL
STANDING UP FROM CHAIR USING ARMS: A LITTLE
WALKING IN HOSPITAL ROOM: A LITTLE
DAILY ACTIVITIY SCORE: 22
SUGGESTED CMS G CODE MODIFIER MOBILITY: CJ
TOILETING: A LITTLE
CLIMB 3 TO 5 STEPS WITH RAILING: A LITTLE
MOBILITY SCORE: 21
HELP NEEDED FOR BATHING: A LITTLE
SUGGESTED CMS G CODE MODIFIER DAILY ACTIVITY: CJ

## 2023-11-07 ASSESSMENT — FIBROSIS 4 INDEX: FIB4 SCORE: 2.3

## 2023-11-07 ASSESSMENT — PAIN DESCRIPTION - PAIN TYPE: TYPE: ACUTE PAIN

## 2023-11-07 ASSESSMENT — LIFESTYLE VARIABLES: SUBSTANCE_ABUSE: 0

## 2023-11-07 NOTE — CARE PLAN
The patient is Stable - Low risk of patient condition declining or worsening    Shift Goals  Clinical Goals: Monitor H and H  Patient Goals: Rest, Comfort  Family Goals: GIOVANNI    Progress made toward(s) clinical / shift goals:        Problem: Pain - Standard  Goal: Alleviation of pain or a reduction in pain to the patient’s comfort goal  Outcome: Progressing     Problem: Knowledge Deficit - Standard  Goal: Patient and family/care givers will demonstrate understanding of plan of care, disease process/condition, diagnostic tests and medications  Outcome: Progressing     Problem: Fall Risk  Goal: Patient will remain free from falls  Outcome: Progressing

## 2023-11-07 NOTE — PROGRESS NOTES
..Gastroenterology Progress Note               Author:  GISELA Jordan Date & Time Created: 11/7/2023 7:59 AM       Patient ID:  Name:             Tyrone Giraldo  YOB: 1960  Age:                 63 y.o.  male  MRN:               [id number]        Medical Decision Making, by Problem:  Active Hospital Problems    Diagnosis     Tobacco abuse [Z72.0]     Hypokalemia [E87.6]     Homelessness [Z59.00]     Constipation [K59.00]     Secondary esophageal varices with bleeding (HCC) [I85.11]     Other cirrhosis of liver (HCC) [K74.69]     Hepatocellular carcinoma (HCC) [C22.0]     Acute blood loss anemia [D62]     History of MI (myocardial infarction) [I25.2]            Presenting Chief Complaint:  Anemia, history of esophageal varices, cirrhosis    Interval History:  11/7/2023: patient seen, no complaints. Endorses melena today. Hgb 7.6    11/6/2023: EGD with Dr. Og:  IMPRESSION:  Trace esophageal varices.  Moderate size isolated gastric varix at the cardia.  No stigmata of recent bleeding.  No higher stigmata.  Food present throughout the stomach.  Limited exam but normal duodenum.    Hospital Medications:  Current Facility-Administered Medications   Medication Dose Frequency Provider Last Rate Last Admin    midodrine (Proamatine) tablet 5 mg  5 mg TID WITH MEALS Foster Tabares M.D.   5 mg at 11/06/23 1717    pantoprazole (Protonix) injection 40 mg  40 mg BID Aletha Olsen M.D.   40 mg at 11/07/23 0553    cefTRIAXone (Rocephin) syringe 2,000 mg  2,000 mg Q24HRS Aletha Olsen M.D.   2,000 mg at 11/07/23 0554    octreotide (SandoSTATIN) 1,250 mcg in  mL Infusion  50 mcg/hr Continuous Aletha Olsen M.D. 10 mL/hr at 11/06/23 0929 50 mcg/hr at 11/06/23 0929    senna-docusate (Pericolace Or Senokot S) 8.6-50 MG per tablet 2 Tablet  2 Tablet BID Aletha Olsen M.D.   2 Tablet at 11/07/23 0551    And    polyethylene glycol/lytes (Miralax) PACKET 1 Packet  1 Packet QDAY BETTY Pompa  HELEN Olsen M.D.        And    magnesium hydroxide (Milk Of Magnesia) suspension 30 mL  30 mL QDAY PRN Aletha Olsen M.D.        And    bisacodyl (Dulcolax) suppository 10 mg  10 mg QDAY PRN Aletha Olsen M.D.        nicotine (Nicoderm) 7 MG/24HR 7 mg  7 mg Daily-0600 Aletha Olsen M.D.   7 mg at 11/07/23 0552    And    nicotine polacrilex (Nicorette) 2 MG piece 2 mg  2 mg Q HOUR PRN Aletha Olsen M.D.       Last reviewed on 11/6/2023 11:27 AM by Iraida Reeves R.N.       Review of Systems:  Review of Systems   Constitutional:  Negative for chills, fever and malaise/fatigue.   HENT:  Negative for hearing loss.    Eyes:  Negative for blurred vision.   Respiratory:  Negative for cough and shortness of breath.    Cardiovascular:  Negative for chest pain and leg swelling.   Gastrointestinal:  Positive for melena. Negative for abdominal pain, blood in stool, constipation, diarrhea, heartburn, nausea and vomiting.   Genitourinary:  Negative for dysuria.   Musculoskeletal:  Negative for back pain.   Skin:  Negative for rash.   Neurological:  Negative for dizziness and weakness.   Psychiatric/Behavioral:  Negative for depression.    All other systems reviewed and are negative.        Vital signs:  Weight/BMI: Body mass index is 22.42 kg/m².  /52   Pulse 94   Temp 36.2 °C (97.1 °F) (Temporal)   Resp 18   Ht 1.829 m (6')   Wt 75 kg (165 lb 5.5 oz)   SpO2 94%   Vitals:    11/06/23 1405 11/06/23 1505 11/06/23 1535 11/07/23 0411   BP: (!) 88/53 95/58 110/74 102/52   Pulse:   76 94   Resp:   17 18   Temp:       TempSrc:    Temporal   SpO2: 90% 92% 93% 94%   Weight:    75 kg (165 lb 5.5 oz)   Height:         Oxygen Therapy:  Pulse Oximetry: 94 %, O2 (LPM): 0, O2 Delivery Device: None - Room Air    Intake/Output Summary (Last 24 hours) at 11/7/2023 0753  Last data filed at 11/7/2023 0600  Gross per 24 hour   Intake 450 ml   Output 1900 ml   Net -1450 ml         Physical Exam:  Physical Exam  Vitals and nursing  note reviewed.   Constitutional:       General: He is not in acute distress.     Appearance: He is ill-appearing.   HENT:      Head: Normocephalic and atraumatic.      Right Ear: External ear normal.      Left Ear: External ear normal.      Nose: Nose normal.      Mouth/Throat:      Mouth: Mucous membranes are moist.      Pharynx: Oropharynx is clear.   Eyes:      General: No scleral icterus.  Cardiovascular:      Rate and Rhythm: Normal rate and regular rhythm.      Pulses: Normal pulses.      Heart sounds: Normal heart sounds.   Pulmonary:      Effort: Pulmonary effort is normal.      Breath sounds: Normal breath sounds.   Abdominal:      General: Bowel sounds are normal. There is distension.      Tenderness: There is no abdominal tenderness.   Musculoskeletal:         General: Normal range of motion.      Cervical back: Normal range of motion and neck supple.   Skin:     General: Skin is warm.      Capillary Refill: Capillary refill takes less than 2 seconds.      Coloration: Skin is pale.   Neurological:      Mental Status: He is alert and oriented to person, place, and time.   Psychiatric:         Mood and Affect: Mood normal.         Behavior: Behavior normal.             Labs:  Recent Labs     11/05/23 0318 11/05/23 1456 11/06/23 0036 11/07/23  0617   SODIUM 131* 131* 135 134*   POTASSIUM 3.0* 3.7 4.2 4.0   CHLORIDE 97 99 103 104   CO2 24 23 24 22   BUN 9 8 9 10   CREATININE 0.78 0.78 0.72 0.90   MAGNESIUM 1.9  --   --   --    PHOSPHORUS 1.8*  --   --   --    CALCIUM 7.7* 7.6* 7.4* 7.0*     Recent Labs     11/05/23 0318 11/05/23  1456 11/06/23  0036 11/07/23  0617   ALTSGPT 60*  --  40 26   ASTSGOT 28  --  22 17   ALKPHOSPHAT 89  --  73 69   TBILIRUBIN 0.6  --  1.2 0.5   LIPASE 35  --   --   --    GLUCOSE 160* 142* 167* 142*     Recent Labs     11/05/23 0318 11/06/23  0036 11/06/23  1517 11/06/23  2159 11/07/23  0617   WBC 7.3 5.0 4.4* 4.4*  --    NEUTSPOLYS 78.40* 66.20 65.00 72.20*  --    LYMPHOCYTES  13.80* 17.30* 19.30* 14.40*  --    MONOCYTES 3.50 12.10 12.50 9.60  --    EOSINOPHILS 2.60 3.20 2.30 2.70  --    BASOPHILS 1.70 0.80 0.70 0.90  --    ASTSGOT 28 22  --   --  17   ALTSGPT 60* 40  --   --  26   ALKPHOSPHAT 89 73  --   --  69   TBILIRUBIN 0.6 1.2  --   --  0.5     Recent Labs     11/05/23  0908 11/05/23  1456 11/06/23  0036 11/06/23  0614 11/06/23  1517 11/06/23  2159   RBC  --   --  3.07*  --  3.07* 3.06*   HEMOGLOBIN 5.4*   < > 7.5* 8.0* 7.4* 7.6*   HEMATOCRIT 18.9*   < > 25.6* 26.6* 25.6* 25.1*   PLATELETCT  --   --  110*  --  100* 97*   PROTHROMBTM 14.8*  --   --   --   --   --    INR 1.14*  --   --   --   --   --     < > = values in this interval not displayed.     Recent Results (from the past 24 hour(s))   CBC WITH DIFFERENTIAL    Collection Time: 11/06/23  3:17 PM   Result Value Ref Range    WBC 4.4 (L) 4.8 - 10.8 K/uL    RBC 3.07 (L) 4.70 - 6.10 M/uL    Hemoglobin 7.4 (L) 14.0 - 18.0 g/dL    Hematocrit 25.6 (L) 42.0 - 52.0 %    MCV 83.4 81.4 - 97.8 fL    MCH 24.1 (L) 27.0 - 33.0 pg    MCHC 28.9 (L) 32.3 - 36.5 g/dL    RDW 53.4 (H) 35.9 - 50.0 fL    Platelet Count 100 (L) 164 - 446 K/uL    MPV 11.8 9.0 - 12.9 fL    Neutrophils-Polys 65.00 44.00 - 72.00 %    Lymphocytes 19.30 (L) 22.00 - 41.00 %    Monocytes 12.50 0.00 - 13.40 %    Eosinophils 2.30 0.00 - 6.90 %    Basophils 0.70 0.00 - 1.80 %    Immature Granulocytes 0.20 0.00 - 0.90 %    Nucleated RBC 0.00 0.00 - 0.20 /100 WBC    Neutrophils (Absolute) 2.87 1.82 - 7.42 K/uL    Lymphs (Absolute) 0.85 (L) 1.00 - 4.80 K/uL    Monos (Absolute) 0.55 0.00 - 0.85 K/uL    Eos (Absolute) 0.10 0.00 - 0.51 K/uL    Baso (Absolute) 0.03 0.00 - 0.12 K/uL    Immature Granulocytes (abs) 0.01 0.00 - 0.11 K/uL    NRBC (Absolute) 0.00 K/uL   IMMATURE PLT FRACTION    Collection Time: 11/06/23  3:17 PM   Result Value Ref Range    Imm. Plt Fraction 17.9 (H) 0.6 - 13.1 %   CBC WITH DIFFERENTIAL    Collection Time: 11/06/23  9:59 PM   Result Value Ref Range    WBC  4.4 (L) 4.8 - 10.8 K/uL    RBC 3.06 (L) 4.70 - 6.10 M/uL    Hemoglobin 7.6 (L) 14.0 - 18.0 g/dL    Hematocrit 25.1 (L) 42.0 - 52.0 %    MCV 82.0 81.4 - 97.8 fL    MCH 24.8 (L) 27.0 - 33.0 pg    MCHC 30.3 (L) 32.3 - 36.5 g/dL    RDW 52.6 (H) 35.9 - 50.0 fL    Platelet Count 97 (L) 164 - 446 K/uL    MPV 12.3 9.0 - 12.9 fL    Neutrophils-Polys 72.20 (H) 44.00 - 72.00 %    Lymphocytes 14.40 (L) 22.00 - 41.00 %    Monocytes 9.60 0.00 - 13.40 %    Eosinophils 2.70 0.00 - 6.90 %    Basophils 0.90 0.00 - 1.80 %    Immature Granulocytes 0.20 0.00 - 0.90 %    Nucleated RBC 0.00 0.00 - 0.20 /100 WBC    Neutrophils (Absolute) 3.15 1.82 - 7.42 K/uL    Lymphs (Absolute) 0.63 (L) 1.00 - 4.80 K/uL    Monos (Absolute) 0.42 0.00 - 0.85 K/uL    Eos (Absolute) 0.12 0.00 - 0.51 K/uL    Baso (Absolute) 0.04 0.00 - 0.12 K/uL    Immature Granulocytes (abs) 0.01 0.00 - 0.11 K/uL    NRBC (Absolute) 0.00 K/uL   IMMATURE PLT FRACTION    Collection Time: 11/06/23  9:59 PM   Result Value Ref Range    Imm. Plt Fraction 16.2 (H) 0.6 - 13.1 %   Comp Metabolic Panel    Collection Time: 11/07/23  6:17 AM   Result Value Ref Range    Sodium 134 (L) 135 - 145 mmol/L    Potassium 4.0 3.6 - 5.5 mmol/L    Chloride 104 96 - 112 mmol/L    Co2 22 20 - 33 mmol/L    Anion Gap 8.0 7.0 - 16.0    Glucose 142 (H) 65 - 99 mg/dL    Bun 10 8 - 22 mg/dL    Creatinine 0.90 0.50 - 1.40 mg/dL    Calcium 7.0 (L) 8.5 - 10.5 mg/dL    Correct Calcium 8.3 (L) 8.5 - 10.5 mg/dL    AST(SGOT) 17 12 - 45 U/L    ALT(SGPT) 26 2 - 50 U/L    Alkaline Phosphatase 69 30 - 99 U/L    Total Bilirubin 0.5 0.1 - 1.5 mg/dL    Albumin 2.4 (L) 3.2 - 4.9 g/dL    Total Protein 5.5 (L) 6.0 - 8.2 g/dL    Globulin 3.1 1.9 - 3.5 g/dL    A-G Ratio 0.8 g/dL   ESTIMATED GFR    Collection Time: 11/07/23  6:17 AM   Result Value Ref Range    GFR (CKD-EPI) 95 >60 mL/min/1.73 m 2       Radiology Review:  CT-ABDOMEN-PELVIS WITH   Final Result      1.  Cirrhosis.   2.  Redemonstration of a hepatic segment 6  mass, likely HCC. Progression of disease with likely metastatic disease to the more inferior aspect of segment 6 and the right adrenal gland.   3.  Increased size of the upper abdominal lymphadenopathy, likely metastatic.   4.  Sequela of portal hypertension including gastric varices, trace perihepatic varices, and splenomegaly.   5.  Multiple other low-density hepatic lesions, cysts versus metastatic disease.   6.  Cholelithiasis with thickened wall, possibly reactive. Consider ultrasound as indicated.            MDM (Data Review):   -Records reviewed and summarized in current documentation  -I personally reviewed and interpreted the laboratory results  -I personally reviewed the radiology images    Assessment/Recommendations:  Impressions:  63-year-old male with cirrhosis, hepatocellular carcinoma, history of esophagogastric varices presents with progressive fatigue.  EGD with trace esophageal and moderate gastric varix, no stigmata of bleeding.      RECOMMENDATIONS:  Advance diet.  2 g sodium restriction.  Continue PPI  Trend H/H and observe for further bleeding  Could consider relook endoscopy if overt signs of GI bleeding  Could consider paracentesis to assess for ascites, abdomen distended on exam  Complete octreotide for 72 hours  Recommend carvedilol 3.125 mg PO BID on discharge if BP can tolerate  Further management of his HCC deferred to Dr. Sommer and his team    Core Quality Measures   Reviewed items::  Labs, Medications and Radiology reports reviewed

## 2023-11-07 NOTE — DISCHARGE PLANNING
Care Transition Team Assessment    CM RN met with pt. Pt stating does not have any family or friends wanted listed for emergency contact. Pt does state will have a ride at discharge. Pt does not use oxygen at [place]. Pt has phone number [phone] but is currently turned off and is planning on going to social security because he has stopped receiving his checks.     Information Source  Orientation Level: Oriented X4  Information Given By: Patient  Who is responsible for making decisions for patient? : Patient    Readmission Evaluation  Is this a readmission?: No    Elopement Risk  Legal Hold: No  Ambulatory or Self Mobile in Wheelchair: No-Not an Elopement Risk    Interdisciplinary Discharge Planning  Lives with - Patient's Self Care Capacity: Alone and Able to Care For Self  Patient or legal guardian wants to designate a caregiver: No  Support Systems: None  Housing / Facility: Homeless  Durable Medical Equipment: Other - Specify (cane)    Discharge Preparedness  What is your plan after discharge?: Home with help  Prior Functional Level: Ambulatory    Functional Assesment  Prior Functional Level: Ambulatory         Vision / Hearing Impairment  Vision Impairment : Yes  Right Eye Vision: Impaired  Left Eye Vision: Impaired  Hearing Impairment : No              Domestic Abuse  Have you ever been the victim of abuse or violence?: No  Physical Abuse or Sexual Abuse: No  Verbal Abuse or Emotional Abuse: No  Possible Abuse/Neglect Reported to:: Not Applicable         Discharge Risks or Barriers  Discharge risks or barriers?: No PCP, Homeless / couch surfing  Patient risk factors: Vulnerable adult    Anticipated Discharge Information  Discharge Disposition: Discharged to home/self care (01)

## 2023-11-07 NOTE — CARE PLAN
The patient is Watcher - Medium risk of patient condition declining or worsening    Shift Goals  Clinical Goals: monitor H& H  Patient Goals: procedure today  Family Goals: deyanira    Progress made toward(s) clinical / shift goals:  drawing serial H&H's, egd done today    Problem: Knowledge Deficit - Standard  Goal: Patient and family/care givers will demonstrate understanding of plan of care, disease process/condition, diagnostic tests and medications  Outcome: Progressing    Discussed plan of care for today w/ pt this am, he is A+O, he verbalizes understanding of his plan of care, no questions. Emotional support given. Reinforcing education as necessary.         Patient is not progressing towards the following goals:      Problem: Bowel Elimination  Goal: Establish and maintain regular bowel function  Outcome: Not Progressing    No BM today, given prune juice, scheduled meds, see MAR

## 2023-11-07 NOTE — DISCHARGE PLANNING
"Community Health Worker Intake    Identified barriers to homelessness.  Resources provided to Access to healthcare transportation, [facility], [facility], [place] [employer], [facility].  Contact information provided to Tyrone Giraldo   Has PCP appointment scheduled for (pt to walk into [facility]).   Inpatient assessment completed.    CHW introduced community care management.   Pt states that he lives on the street / couch surfs with friends when able.   Pt states that he does not want anything to do with the [facility].   Pt also reports that he has not received his SSI as he does not have a mail box.   Pt aware that he needs to go to the Social Security office, just refusing to go as he does not want to wait in line for \"4 hours\".   Pt reports that he does not take the [facility] [employer] bus.   CHW provided information to [facility] transportation.   Pt states that he has attempted to apply for SNAP benefits but was told that he brings in too much income.   CHW provided information to   Pt has Medicare & Medicaid. [employer] has been contacted regarding updating the pt's coverage.     Plan: CHW will not be able to contact the pt post hospital DC as he has not paid for his phone bill.   CHW provided contact info.   CHW will remove the pt from [facility] caseload due to lack of contact.     "

## 2023-11-07 NOTE — DISCHARGE SUMMARY
Discharge Summary    CHIEF COMPLAINT ON ADMISSION  Chief Complaint   Patient presents with    Weakness     Pt reports generalized weakness x 5 days. Reports dizziness x 5 days. Due to symptoms, pt reports decrease ambulation and ability to perform ADL.     Abdominal Pain     C/o RLQ intermittent abd pain, stabbing 7/10 when present. Denies dysuria. Reports no BM 5 days.        Reason for Admission  Dizziness     Admission Date  11/5/2023    CODE STATUS  Full Code    HPI & HOSPITAL COURSE  Patient is a 63 y.o. male with past medical history of hepatocellular carcinoma, esophageal varices, CAD status post stent who presented 11/5/2023 with weakness and fatigue.  History of hepatocellular carcinoma currently following with Dr. Melton.     Symptoms were going on for the 5 days along with constipation.  He has seen no obvious GI blood loss - no blood in stool and no hematemesis.  Recent admission for melena 8/16 - 8/23, evaluated by GI here and was found to have esophageal varices s/p banding.  He was discharged on carvedilol, he was taking all his medications up until 2-3 weeks  prior to admission when he ran out.    He was found to have acute on chronic anemia and did require a transfusion of PRBC. His hemoglobin did then become stable and he had no evidence of acute bleeding.     He was followed here by GI and underwent an  EGD on 11/6/23 that  noted with trace esophageal varices, no stigmata of recent bleeding. He received a course of iv ceftriaxone and was tapered off IV octreotide and IV protonix. His constipation also resolved. He was cleared by GI and will follow up with Dr. Melton as planned.     Smoking cessation was discussed and recommended. He agrees to return to the ER if needed and to also follow up with his pcp.     Therefore, he is discharged in fair and stable condition to home with close outpatient follow-up.    The patient met 2-midnight criteria for an inpatient stay at the time of  discharge.    Discharge Date  11/9/23    FOLLOW UP ITEMS POST DISCHARGE  Surgical oncology  GI   Pcp    DISCHARGE DIAGNOSES  Principal Problem:    Acute blood loss anemia (POA: Yes)  Active Problems:    History of MI (myocardial infarction) (POA: Yes)    Hepatocellular carcinoma (HCC) (POA: Yes)    Other cirrhosis of liver (HCC) (POA: Yes)    Secondary esophageal varices with bleeding (HCC) (POA: Yes)    Tobacco abuse (POA: Yes)    Hypokalemia (POA: Yes)    Homelessness (POA: Yes)    Constipation (POA: Unknown)  Resolved Problems:    * No resolved hospital problems. *      FOLLOW UP  No future appointments.  No follow-up provider specified.    MEDICATIONS ON DISCHARGE     Medication List        ASK your doctor about these medications        Instructions   Aspirin Low Dose 81 MG Chew chewable tablet  Generic drug: aspirin   Chew 1 Tablet every day.  Dose: 81 mg     carvedilol 3.125 MG Tabs  Commonly known as: Coreg   Take 1 Tablet by mouth 2 times a day with meals.  Dose: 3.125 mg     omeprazole 20 MG delayed-release capsule  Commonly known as: PriLOSEC   Take 1 Capsule by mouth 2 times a day.  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  Weight bearing as tolerated    CONSULTATIONS  [facility]    PROCEDURES  11/6/23 Dr. Og Gastroscopy    LABORATORY  Lab Results   Component Value Date    SODIUM 134 (L) 11/07/2023    POTASSIUM 4.0 11/07/2023    CHLORIDE 104 11/07/2023    CO2 22 11/07/2023    GLUCOSE 142 (H) 11/07/2023    BUN 10 11/07/2023    CREATININE 0.90 11/07/2023     CREATININE 1.1 05/22/2007        Lab Results   Component Value Date    WBC 4.0 (L) 11/07/2023    HEMOGLOBIN 7.5 (L) 11/07/2023    HEMATOCRIT 26.2 (L) 11/07/2023    PLATELETCT 102 (L) 11/07/2023        Total time of the discharge process exceeds 45 minutes.

## 2023-11-08 LAB
ANISOCYTOSIS BLD QL SMEAR: ABNORMAL
BASOPHILS # BLD AUTO: 0.5 % (ref 0–1.8)
BASOPHILS # BLD AUTO: 0.5 % (ref 0–1.8)
BASOPHILS # BLD: 0.02 K/UL (ref 0–0.12)
BASOPHILS # BLD: 0.02 K/UL (ref 0–0.12)
COMMENT 1642: NORMAL
EOSINOPHIL # BLD AUTO: 0.14 K/UL (ref 0–0.51)
EOSINOPHIL # BLD AUTO: 0.14 K/UL (ref 0–0.51)
EOSINOPHIL NFR BLD: 3.7 % (ref 0–6.9)
EOSINOPHIL NFR BLD: 3.7 % (ref 0–6.9)
ERYTHROCYTE [DISTWIDTH] IN BLOOD BY AUTOMATED COUNT: 54.5 FL (ref 35.9–50)
ERYTHROCYTE [DISTWIDTH] IN BLOOD BY AUTOMATED COUNT: 56.7 FL (ref 35.9–50)
HCT VFR BLD AUTO: 24.8 % (ref 42–52)
HCT VFR BLD AUTO: 25.5 % (ref 42–52)
HGB BLD-MCNC: 7.4 G/DL (ref 14–18)
HGB BLD-MCNC: 7.5 G/DL (ref 14–18)
HYPOCHROMIA BLD QL SMEAR: ABNORMAL
IMM GRANULOCYTES # BLD AUTO: 0.01 K/UL (ref 0–0.11)
IMM GRANULOCYTES # BLD AUTO: 0.01 K/UL (ref 0–0.11)
IMM GRANULOCYTES NFR BLD AUTO: 0.3 % (ref 0–0.9)
IMM GRANULOCYTES NFR BLD AUTO: 0.3 % (ref 0–0.9)
LYMPHOCYTES # BLD AUTO: 0.68 K/UL (ref 1–4.8)
LYMPHOCYTES # BLD AUTO: 0.69 K/UL (ref 1–4.8)
LYMPHOCYTES NFR BLD: 18 % (ref 22–41)
LYMPHOCYTES NFR BLD: 18 % (ref 22–41)
MCH RBC QN AUTO: 24.6 PG (ref 27–33)
MCH RBC QN AUTO: 24.8 PG (ref 27–33)
MCHC RBC AUTO-ENTMCNC: 29.4 G/DL (ref 32.3–36.5)
MCHC RBC AUTO-ENTMCNC: 29.8 G/DL (ref 32.3–36.5)
MCV RBC AUTO: 82.4 FL (ref 81.4–97.8)
MCV RBC AUTO: 84.4 FL (ref 81.4–97.8)
MICROCYTES BLD QL SMEAR: ABNORMAL
MONOCYTES # BLD AUTO: 0.35 K/UL (ref 0–0.85)
MONOCYTES # BLD AUTO: 0.36 K/UL (ref 0–0.85)
MONOCYTES NFR BLD AUTO: 9.3 % (ref 0–13.4)
MONOCYTES NFR BLD AUTO: 9.4 % (ref 0–13.4)
MORPHOLOGY BLD-IMP: NORMAL
NEUTROPHILS # BLD AUTO: 2.57 K/UL (ref 1.82–7.42)
NEUTROPHILS # BLD AUTO: 2.61 K/UL (ref 1.82–7.42)
NEUTROPHILS NFR BLD: 68.1 % (ref 44–72)
NEUTROPHILS NFR BLD: 68.2 % (ref 44–72)
NRBC # BLD AUTO: 0 K/UL
NRBC # BLD AUTO: 0 K/UL
NRBC BLD-RTO: 0 /100 WBC (ref 0–0.2)
NRBC BLD-RTO: 0 /100 WBC (ref 0–0.2)
OVALOCYTES BLD QL SMEAR: NORMAL
PLATELET # BLD AUTO: 91 K/UL (ref 164–446)
PLATELET # BLD AUTO: 93 K/UL (ref 164–446)
PLATELET BLD QL SMEAR: NORMAL
PLATELETS.RETICULATED NFR BLD AUTO: 15.4 % (ref 0.6–13.1)
PLATELETS.RETICULATED NFR BLD AUTO: 18.6 % (ref 0.6–13.1)
PMV BLD AUTO: 12.9 FL (ref 9–12.9)
RBC # BLD AUTO: 3.01 M/UL (ref 4.7–6.1)
RBC # BLD AUTO: 3.02 M/UL (ref 4.7–6.1)
RBC BLD AUTO: PRESENT
WBC # BLD AUTO: 3.8 K/UL (ref 4.8–10.8)
WBC # BLD AUTO: 3.8 K/UL (ref 4.8–10.8)

## 2023-11-08 PROCEDURE — 770001 HCHG ROOM/CARE - MED/SURG/GYN PRIV*

## 2023-11-08 PROCEDURE — 85025 COMPLETE CBC W/AUTO DIFF WBC: CPT

## 2023-11-08 PROCEDURE — 700102 HCHG RX REV CODE 250 W/ 637 OVERRIDE(OP): Performed by: STUDENT IN AN ORGANIZED HEALTH CARE EDUCATION/TRAINING PROGRAM

## 2023-11-08 PROCEDURE — C9113 INJ PANTOPRAZOLE SODIUM, VIA: HCPCS | Performed by: STUDENT IN AN ORGANIZED HEALTH CARE EDUCATION/TRAINING PROGRAM

## 2023-11-08 PROCEDURE — 700111 HCHG RX REV CODE 636 W/ 250 OVERRIDE (IP): Performed by: STUDENT IN AN ORGANIZED HEALTH CARE EDUCATION/TRAINING PROGRAM

## 2023-11-08 PROCEDURE — 99233 SBSQ HOSP IP/OBS HIGH 50: CPT | Performed by: HOSPITALIST

## 2023-11-08 PROCEDURE — A9270 NON-COVERED ITEM OR SERVICE: HCPCS | Performed by: STUDENT IN AN ORGANIZED HEALTH CARE EDUCATION/TRAINING PROGRAM

## 2023-11-08 PROCEDURE — 99232 SBSQ HOSP IP/OBS MODERATE 35: CPT | Performed by: NURSE PRACTITIONER

## 2023-11-08 PROCEDURE — 85055 RETICULATED PLATELET ASSAY: CPT

## 2023-11-08 RX ADMIN — NICOTINE 7 MG: 7 PATCH, EXTENDED RELEASE TRANSDERMAL at 06:09

## 2023-11-08 RX ADMIN — DOCUSATE SODIUM 50 MG AND SENNOSIDES 8.6 MG 2 TABLET: 8.6; 5 TABLET, FILM COATED ORAL at 06:10

## 2023-11-08 RX ADMIN — MIDODRINE HYDROCHLORIDE 5 MG: 5 TABLET ORAL at 12:53

## 2023-11-08 RX ADMIN — PANTOPRAZOLE SODIUM 40 MG: 40 INJECTION, POWDER, FOR SOLUTION INTRAVENOUS at 17:23

## 2023-11-08 RX ADMIN — CEFTRIAXONE SODIUM 2000 MG: 10 INJECTION, POWDER, FOR SOLUTION INTRAVENOUS at 06:01

## 2023-11-08 RX ADMIN — MIDODRINE HYDROCHLORIDE 5 MG: 5 TABLET ORAL at 17:23

## 2023-11-08 RX ADMIN — PANTOPRAZOLE SODIUM 40 MG: 40 INJECTION, POWDER, FOR SOLUTION INTRAVENOUS at 06:07

## 2023-11-08 RX ADMIN — MIDODRINE HYDROCHLORIDE 5 MG: 5 TABLET ORAL at 08:39

## 2023-11-08 ASSESSMENT — ENCOUNTER SYMPTOMS
ABDOMINAL PAIN: 0
BLURRED VISION: 0
FOCAL WEAKNESS: 0
FEVER: 0
HEADACHES: 0
VOMITING: 0
CONSTIPATION: 0
RESPIRATORY NEGATIVE: 1
COUGH: 0
HEARTBURN: 0
SHORTNESS OF BREATH: 0
CARDIOVASCULAR NEGATIVE: 1
WEAKNESS: 0
DIZZINESS: 0
DEPRESSION: 0
DIARRHEA: 0
MUSCULOSKELETAL NEGATIVE: 1
DIAPHORESIS: 0
MYALGIAS: 0
EYES NEGATIVE: 1
BACK PAIN: 0
CHILLS: 0
NAUSEA: 0
SORE THROAT: 0
PALPITATIONS: 0
BLOOD IN STOOL: 0
WEIGHT LOSS: 0
BRUISES/BLEEDS EASILY: 0
PSYCHIATRIC NEGATIVE: 1

## 2023-11-08 ASSESSMENT — FIBROSIS 4 INDEX: FIB4 SCORE: 2.29

## 2023-11-08 ASSESSMENT — PAIN DESCRIPTION - PAIN TYPE: TYPE: ACUTE PAIN

## 2023-11-08 ASSESSMENT — LIFESTYLE VARIABLES: SUBSTANCE_ABUSE: 0

## 2023-11-08 NOTE — PROGRESS NOTES
Hospital Medicine Daily Progress Note    Date of Service  11/8/2023    Chief Complaint  Tyrone Giraldo is a 63 y.o. male admitted 11/5/2023 with fatigue    Hospital Course  Patient is a 63 y.o. male with past medical history of hepatocellular carcinoma, esophageal varices, CAD status post stent who presented 11/5/2023 with weakness and fatigue.  History of hepatocellular carcinoma currently following with Dr. Melton.     Recent admission for melena 8/16 - 8/23, evaluated by GI here and was found to have esophageal varices s/p banding.  He was discharged on carvedilol, he was taking all his medications up until 2-3 weeks ago when he ran out.     S/p EGD on 11/6 noted with trace esophageal varices, no stigmata of recent bleeding.    Interval Problem Update  Axox3, no pain, tolerated diet, finally had a bm - dark , unclear if this is the old blood as h/h in stable range, discussed with GI, will repeat cbc in am, continue to follow, ros otherwise negative.      I have discussed this patient's plan of care and discharge plan at IDT rounds today with Case Management, Nursing, Nursing leadership, and other members of the IDT team.    Consultants/Specialty  GI    Code Status  Full Code    Disposition  The patient is not medically cleared for discharge to home or a post-acute facility.      I have placed the appropriate orders for post-discharge needs.    Review of Systems  Review of Systems   Constitutional:  Positive for malaise/fatigue. Negative for chills, diaphoresis, fever and weight loss.   HENT: Negative.  Negative for sore throat.    Eyes: Negative.  Negative for blurred vision.   Respiratory: Negative.  Negative for cough and shortness of breath.    Cardiovascular: Negative.  Negative for chest pain, palpitations and leg swelling.   Gastrointestinal:  Negative for abdominal pain, blood in stool, constipation, melena, nausea and vomiting.   Genitourinary: Negative.  Negative for dysuria.   Musculoskeletal:  Negative.  Negative for myalgias.   Skin: Negative.  Negative for itching and rash.   Neurological:  Negative for dizziness, focal weakness, weakness and headaches.   Endo/Heme/Allergies: Negative.  Does not bruise/bleed easily.   Psychiatric/Behavioral: Negative.  Negative for depression, substance abuse and suicidal ideas.    All other systems reviewed and are negative.       Physical Exam  Temp:  [36.6 °C (97.9 °F)-36.9 °C (98.4 °F)] 36.9 °C (98.4 °F)  Pulse:  [71-81] 80  Resp:  [16] 16  BP: (109-117)/(60-71) 111/60  SpO2:  [91 %-95 %] 94 %    Physical Exam  Vitals and nursing note reviewed. Exam conducted with a chaperone present.   Constitutional:       General: He is not in acute distress.     Appearance: Normal appearance. He is not ill-appearing or diaphoretic.   HENT:      Head: Normocephalic.      Nose: Nose normal.      Mouth/Throat:      Mouth: Mucous membranes are moist.   Eyes:      Pupils: Pupils are equal, round, and reactive to light.   Cardiovascular:      Rate and Rhythm: Normal rate and regular rhythm.      Pulses: Normal pulses.      Heart sounds: Normal heart sounds.   Pulmonary:      Effort: Pulmonary effort is normal.      Breath sounds: Normal breath sounds.   Abdominal:      General: Abdomen is flat. Bowel sounds are normal. There is distension.      Palpations: Abdomen is soft. There is no mass.      Tenderness: There is no abdominal tenderness. There is no guarding or rebound.      Hernia: No hernia is present.   Musculoskeletal:         General: No swelling or deformity. Normal range of motion.      Right lower leg: No edema.      Left lower leg: No edema.   Skin:     General: Skin is warm and dry.      Capillary Refill: Capillary refill takes less than 2 seconds.   Neurological:      General: No focal deficit present.      Mental Status: He is alert and oriented to person, place, and time. Mental status is at baseline.      Cranial Nerves: No cranial nerve deficit.   Psychiatric:          Mood and Affect: Mood normal.         Behavior: Behavior normal.         Fluids    Intake/Output Summary (Last 24 hours) at 11/8/2023 1546  Last data filed at 11/8/2023 1005  Gross per 24 hour   Intake 650 ml   Output 2400 ml   Net -1750 ml       Laboratory  Recent Labs     11/06/23  2159 11/07/23  0617 11/07/23  2155 11/08/23  0559 11/08/23  1402   WBC 4.4*   < > 4.1* 3.8* 3.8*   RBC 3.06*   < > 3.07* 3.01* 3.02*   HEMOGLOBIN 7.6*   < > 7.6* 7.4* 7.5*   HEMATOCRIT 25.1*   < > 25.4* 24.8* 25.5*   MCV 82.0   < > 82.7 82.4 84.4   MCH 24.8*   < > 24.8* 24.6* 24.8*   MCHC 30.3*   < > 29.9* 29.8* 29.4*   RDW 52.6*   < > 54.1* 54.5* 56.7*   PLATELETCT 97*   < > 93* 91* 93*   MPV 12.3  --  13.6*  --  12.9    < > = values in this interval not displayed.     Recent Labs     11/06/23  0036 11/07/23  0617   SODIUM 135 134*   POTASSIUM 4.2 4.0   CHLORIDE 103 104   CO2 24 22   GLUCOSE 167* 142*   BUN 9 10   CREATININE 0.72 0.90   CALCIUM 7.4* 7.0*                   Imaging  CT-ABDOMEN-PELVIS WITH   Final Result      1.  Cirrhosis.   2.  Redemonstration of a hepatic segment 6 mass, likely HCC. Progression of disease with likely metastatic disease to the more inferior aspect of segment 6 and the right adrenal gland.   3.  Increased size of the upper abdominal lymphadenopathy, likely metastatic.   4.  Sequela of portal hypertension including gastric varices, trace perihepatic varices, and splenomegaly.   5.  Multiple other low-density hepatic lesions, cysts versus metastatic disease.   6.  Cholelithiasis with thickened wall, possibly reactive. Consider ultrasound as indicated.           Assessment/Plan  * Acute blood loss anemia- (present on admission)  Assessment & Plan  History of cirrhosis secondary to hepatocellular carcinoma and known esophageal varices, banding on last admission 08/2023  S/p transfusion, now h/h in stable range, continue to follow , repeat cbc in am  Trend Hgb including post transfusion, transfuse if less  than 7    Constipation  Assessment & Plan  Resolved, continue bowel regimen      Homelessness- (present on admission)  Assessment & Plan  Meds to bed on discharge    Hypokalemia- (present on admission)  Assessment & Plan  Resolved    Tobacco abuse- (present on admission)  Assessment & Plan  I spent nearly  4 minutes on Tobacco cessation education and counseling.   I Discussed the benefits of quitting smoking and risks of continued smoking including cardiovascular disease, cancer and COPD.   Discussed options of nicotine patch.  Nicotine patch ordered    Secondary esophageal varices with bleeding (HCC)- (present on admission)  Assessment & Plan  Comes in with significant anemia, hemoglobin on presentation 5.9.  History of esophageal varices and melena in the past  S/p banding 08/2023 and has been on carvedilol up until a few weeks ago after running out of medications    S/p 2 unit PRBC transfusion    S/p EGD on 11/6 noted with trace esophageal varices, no stigmata of recent bleeding.  Ceftriaxone, pantoprazole, - octreotide drip completed  Case discussed with gastroenterology   See above  Repeat cbc in am      Other cirrhosis of liver (HCC)- (present on admission)  Assessment & Plan  Secondary to hepatocellular carcinoma    Hepatocellular carcinoma (HCC)- (present on admission)  Assessment & Plan  Currently following with Dr. Melton.    Surgery for his hepatocellular carcinoma has not been planned yet as patient does not have stable housing, which he is working to remedy    History of MI (myocardial infarction)- (present on admission)  Assessment & Plan  Stents in 2006 and 2009.  Has not been on aspirin for the past few weeks. Hold off for now due to acute blood loss anemia  No chest pain  following         VTE prophylaxis: SCD    I have performed a physical exam and reviewed and updated ROS and Plan today (11/8/2023). In review of yesterday's note (11/7/2023), there are no changes except as documented  above.

## 2023-11-08 NOTE — PROGRESS NOTES
..Gastroenterology Progress Note               Author:  GISELA Jordan Date & Time Created: 11/8/2023 3:08 PM       Patient ID:  Name:             Tyrone Giraldo  YOB: 1960  Age:                 63 y.o.  male  MRN:               [id number]        Medical Decision Making, by Problem:  Active Hospital Problems    Diagnosis     Tobacco abuse [Z72.0]     Hypokalemia [E87.6]     Homelessness [Z59.00]     Constipation [K59.00]     Secondary esophageal varices with bleeding (HCC) [I85.11]     Other cirrhosis of liver (HCC) [K74.69]     Hepatocellular carcinoma (HCC) [C22.0]     Acute blood loss anemia [D62]     History of MI (myocardial infarction) [I25.2]            Presenting Chief Complaint:  Anemia, history of esophageal varices, cirrhosis    Interval History:  11/8/2023: Patient seen, no complaints. No reports of bleeding. Good appetite. Hgb stable.    11/7/2023: patient seen, no complaints. Endorses melena today. Hgb 7.6    11/6/2023: EGD with Dr. Og:  IMPRESSION:  Trace esophageal varices.  Moderate size isolated gastric varix at the cardia.  No stigmata of recent bleeding.  No higher stigmata.  Food present throughout the stomach.  Limited exam but normal duodenum.    Hospital Medications:  Current Facility-Administered Medications   Medication Dose Frequency Provider Last Rate Last Admin    midodrine (Proamatine) tablet 5 mg  5 mg TID WITH MEALS Foster Tabares M.D.   5 mg at 11/08/23 1253    pantoprazole (Protonix) injection 40 mg  40 mg BID Aletha Olsen M.D.   40 mg at 11/08/23 0607    cefTRIAXone (Rocephin) syringe 2,000 mg  2,000 mg Q24HRS Aletha Olsen M.D.   2,000 mg at 11/08/23 0601    senna-docusate (Pericolace Or Senokot S) 8.6-50 MG per tablet 2 Tablet  2 Tablet BID Aletha Olsen M.D.   2 Tablet at 11/08/23 0610    And    polyethylene glycol/lytes (Miralax) PACKET 1 Packet  1 Packet QDAY PRN Aletha Olsen M.D.   1 Packet at 11/07/23 1175    And    magnesium  hydroxide (Milk Of Magnesia) suspension 30 mL  30 mL QDAY PRN Aletha Olsen M.D.        And    bisacodyl (Dulcolax) suppository 10 mg  10 mg QDAY PRN Aletha Olsen M.D.   10 mg at 11/07/23 1155    nicotine (Nicoderm) 7 MG/24HR 7 mg  7 mg Daily-0600 Aletha Olsen M.D.   7 mg at 11/08/23 0609    And    nicotine polacrilex (Nicorette) 2 MG piece 2 mg  2 mg Q HOUR PRN Aletha Olsen M.D.       Last reviewed on 11/6/2023 11:27 AM by Iraida Reeves R.N.       Review of Systems:  Review of Systems   Constitutional:  Negative for chills, fever and malaise/fatigue.   HENT:  Negative for hearing loss.    Eyes:  Negative for blurred vision.   Respiratory:  Negative for cough and shortness of breath.    Cardiovascular:  Negative for chest pain and leg swelling.   Gastrointestinal:  Positive for melena. Negative for abdominal pain, blood in stool, constipation, diarrhea, heartburn, nausea and vomiting.   Genitourinary:  Negative for dysuria.   Musculoskeletal:  Negative for back pain.   Skin:  Negative for rash.   Neurological:  Negative for dizziness and weakness.   Psychiatric/Behavioral:  Negative for depression.    All other systems reviewed and are negative.        Vital signs:  Weight/BMI: Body mass index is 22.19 kg/m².  /60   Pulse 80   Temp 36.9 °C (98.4 °F) (Temporal)   Resp 16   Ht 1.829 m (6')   Wt 74.2 kg (163 lb 9.3 oz)   SpO2 94%   Vitals:    11/07/23 1635 11/07/23 2015 11/08/23 0403 11/08/23 0838   BP: 115/62 117/71 109/65 111/60   Pulse: 79 81 71 80   Resp: 16 16 16 16   Temp: 36.6 °C (97.9 °F)   36.9 °C (98.4 °F)   TempSrc: Temporal Temporal Temporal Temporal   SpO2: 92% 91% 95% 94%   Weight:   74.2 kg (163 lb 9.3 oz)    Height:         Oxygen Therapy:  Pulse Oximetry: 94 %, O2 (LPM): 0, O2 Delivery Device: None - Room Air    Intake/Output Summary (Last 24 hours) at 11/8/2023 1508  Last data filed at 11/8/2023 1005  Gross per 24 hour   Intake 650 ml   Output 2400 ml   Net -1750 ml            Physical Exam:  Physical Exam  Vitals and nursing note reviewed.   Constitutional:       General: He is not in acute distress.     Appearance: He is ill-appearing.   HENT:      Head: Normocephalic and atraumatic.      Right Ear: External ear normal.      Left Ear: External ear normal.      Nose: Nose normal.      Mouth/Throat:      Mouth: Mucous membranes are moist.      Pharynx: Oropharynx is clear.   Eyes:      General: No scleral icterus.  Cardiovascular:      Rate and Rhythm: Normal rate and regular rhythm.      Pulses: Normal pulses.      Heart sounds: Normal heart sounds.   Pulmonary:      Effort: Pulmonary effort is normal.      Breath sounds: Normal breath sounds.   Abdominal:      General: Bowel sounds are normal. There is distension.      Tenderness: There is no abdominal tenderness.   Musculoskeletal:         General: Normal range of motion.      Cervical back: Normal range of motion and neck supple.   Skin:     General: Skin is warm.      Capillary Refill: Capillary refill takes less than 2 seconds.      Coloration: Skin is pale.   Neurological:      Mental Status: He is alert and oriented to person, place, and time.   Psychiatric:         Mood and Affect: Mood normal.         Behavior: Behavior normal.             Labs:  Recent Labs     11/06/23  0036 11/07/23  0617   SODIUM 135 134*   POTASSIUM 4.2 4.0   CHLORIDE 103 104   CO2 24 22   BUN 9 10   CREATININE 0.72 0.90   CALCIUM 7.4* 7.0*       Recent Labs     11/06/23  0036 11/07/23  0617   ALTSGPT 40 26   ASTSGOT 22 17   ALKPHOSPHAT 73 69   TBILIRUBIN 1.2 0.5   GLUCOSE 167* 142*       Recent Labs     11/06/23  0036 11/06/23  1517 11/07/23  0617 11/07/23  1358 11/07/23  2155 11/08/23  0559 11/08/23  1402   WBC 5.0   < > 4.0*   < > 4.1* 3.8* 3.8*   NEUTSPOLYS 66.20   < > 62.30   < > 71.90 68.10 68.20   LYMPHOCYTES 17.30*   < > 22.40   < > 14.80* 18.00* 18.00*   MONOCYTES 12.10   < > 11.30   < > 9.20 9.40 9.30   EOSINOPHILS 3.20   < > 2.50   < >  3.20 3.70 3.70   BASOPHILS 0.80   < > 1.00   < > 0.70 0.50 0.50   ASTSGOT 22  --  17  --   --   --   --    ALTSGPT 40  --  26  --   --   --   --    ALKPHOSPHAT 73  --  69  --   --   --   --    TBILIRUBIN 1.2  --  0.5  --   --   --   --     < > = values in this interval not displayed.       Recent Labs     11/07/23  2155 11/08/23  0559 11/08/23  1402   RBC 3.07* 3.01* 3.02*   HEMOGLOBIN 7.6* 7.4* 7.5*   HEMATOCRIT 25.4* 24.8* 25.5*   PLATELETCT 93* 91* 93*       Recent Results (from the past 24 hour(s))   CBC WITH DIFFERENTIAL    Collection Time: 11/07/23  9:55 PM   Result Value Ref Range    WBC 4.1 (L) 4.8 - 10.8 K/uL    RBC 3.07 (L) 4.70 - 6.10 M/uL    Hemoglobin 7.6 (L) 14.0 - 18.0 g/dL    Hematocrit 25.4 (L) 42.0 - 52.0 %    MCV 82.7 81.4 - 97.8 fL    MCH 24.8 (L) 27.0 - 33.0 pg    MCHC 29.9 (L) 32.3 - 36.5 g/dL    RDW 54.1 (H) 35.9 - 50.0 fL    Platelet Count 93 (L) 164 - 446 K/uL    MPV 13.6 (H) 9.0 - 12.9 fL    Neutrophils-Polys 71.90 44.00 - 72.00 %    Lymphocytes 14.80 (L) 22.00 - 41.00 %    Monocytes 9.20 0.00 - 13.40 %    Eosinophils 3.20 0.00 - 6.90 %    Basophils 0.70 0.00 - 1.80 %    Immature Granulocytes 0.20 0.00 - 0.90 %    Nucleated RBC 0.00 0.00 - 0.20 /100 WBC    Neutrophils (Absolute) 2.96 1.82 - 7.42 K/uL    Lymphs (Absolute) 0.61 (L) 1.00 - 4.80 K/uL    Monos (Absolute) 0.38 0.00 - 0.85 K/uL    Eos (Absolute) 0.13 0.00 - 0.51 K/uL    Baso (Absolute) 0.03 0.00 - 0.12 K/uL    Immature Granulocytes (abs) 0.01 0.00 - 0.11 K/uL    NRBC (Absolute) 0.00 K/uL   IMMATURE PLT FRACTION    Collection Time: 11/07/23  9:55 PM   Result Value Ref Range    Imm. Plt Fraction 16.5 (H) 0.6 - 13.1 %   CBC WITH DIFFERENTIAL    Collection Time: 11/08/23  5:59 AM   Result Value Ref Range    WBC 3.8 (L) 4.8 - 10.8 K/uL    RBC 3.01 (L) 4.70 - 6.10 M/uL    Hemoglobin 7.4 (L) 14.0 - 18.0 g/dL    Hematocrit 24.8 (L) 42.0 - 52.0 %    MCV 82.4 81.4 - 97.8 fL    MCH 24.6 (L) 27.0 - 33.0 pg    MCHC 29.8 (L) 32.3 - 36.5 g/dL     RDW 54.5 (H) 35.9 - 50.0 fL    Platelet Count 91 (L) 164 - 446 K/uL    Neutrophils-Polys 68.10 44.00 - 72.00 %    Lymphocytes 18.00 (L) 22.00 - 41.00 %    Monocytes 9.40 0.00 - 13.40 %    Eosinophils 3.70 0.00 - 6.90 %    Basophils 0.50 0.00 - 1.80 %    Immature Granulocytes 0.30 0.00 - 0.90 %    Nucleated RBC 0.00 0.00 - 0.20 /100 WBC    Neutrophils (Absolute) 2.61 1.82 - 7.42 K/uL    Lymphs (Absolute) 0.69 (L) 1.00 - 4.80 K/uL    Monos (Absolute) 0.36 0.00 - 0.85 K/uL    Eos (Absolute) 0.14 0.00 - 0.51 K/uL    Baso (Absolute) 0.02 0.00 - 0.12 K/uL    Immature Granulocytes (abs) 0.01 0.00 - 0.11 K/uL    NRBC (Absolute) 0.00 K/uL    Hypochromia 1+     Anisocytosis 1+     Microcytosis 1+    PLATELET ESTIMATE    Collection Time: 11/08/23  5:59 AM   Result Value Ref Range    Plt Estimation Decreased    MORPHOLOGY    Collection Time: 11/08/23  5:59 AM   Result Value Ref Range    RBC Morphology Present     Ovalocytes 1+    PERIPHERAL SMEAR REVIEW    Collection Time: 11/08/23  5:59 AM   Result Value Ref Range    Peripheral Smear Review see below    IMMATURE PLT FRACTION    Collection Time: 11/08/23  5:59 AM   Result Value Ref Range    Imm. Plt Fraction 15.4 (H) 0.6 - 13.1 %   DIFFERENTIAL COMMENT    Collection Time: 11/08/23  5:59 AM   Result Value Ref Range    Comments-Diff see below    CBC WITH DIFFERENTIAL    Collection Time: 11/08/23  2:02 PM   Result Value Ref Range    WBC 3.8 (L) 4.8 - 10.8 K/uL    RBC 3.02 (L) 4.70 - 6.10 M/uL    Hemoglobin 7.5 (L) 14.0 - 18.0 g/dL    Hematocrit 25.5 (L) 42.0 - 52.0 %    MCV 84.4 81.4 - 97.8 fL    MCH 24.8 (L) 27.0 - 33.0 pg    MCHC 29.4 (L) 32.3 - 36.5 g/dL    RDW 56.7 (H) 35.9 - 50.0 fL    Platelet Count 93 (L) 164 - 446 K/uL    MPV 12.9 9.0 - 12.9 fL    Neutrophils-Polys 68.20 44.00 - 72.00 %    Lymphocytes 18.00 (L) 22.00 - 41.00 %    Monocytes 9.30 0.00 - 13.40 %    Eosinophils 3.70 0.00 - 6.90 %    Basophils 0.50 0.00 - 1.80 %    Immature Granulocytes 0.30 0.00 - 0.90 %     Nucleated RBC 0.00 0.00 - 0.20 /100 WBC    Neutrophils (Absolute) 2.57 1.82 - 7.42 K/uL    Lymphs (Absolute) 0.68 (L) 1.00 - 4.80 K/uL    Monos (Absolute) 0.35 0.00 - 0.85 K/uL    Eos (Absolute) 0.14 0.00 - 0.51 K/uL    Baso (Absolute) 0.02 0.00 - 0.12 K/uL    Immature Granulocytes (abs) 0.01 0.00 - 0.11 K/uL    NRBC (Absolute) 0.00 K/uL   IMMATURE PLT FRACTION    Collection Time: 11/08/23  2:02 PM   Result Value Ref Range    Imm. Plt Fraction 18.6 (H) 0.6 - 13.1 %       Radiology Review:  CT-ABDOMEN-PELVIS WITH   Final Result      1.  Cirrhosis.   2.  Redemonstration of a hepatic segment 6 mass, likely HCC. Progression of disease with likely metastatic disease to the more inferior aspect of segment 6 and the right adrenal gland.   3.  Increased size of the upper abdominal lymphadenopathy, likely metastatic.   4.  Sequela of portal hypertension including gastric varices, trace perihepatic varices, and splenomegaly.   5.  Multiple other low-density hepatic lesions, cysts versus metastatic disease.   6.  Cholelithiasis with thickened wall, possibly reactive. Consider ultrasound as indicated.            MDM (Data Review):   -Records reviewed and summarized in current documentation  -I personally reviewed and interpreted the laboratory results  -I personally reviewed the radiology images    Assessment/Recommendations:  Impressions:  63-year-old male with cirrhosis, hepatocellular carcinoma, history of esophagogastric varices presents with progressive fatigue.  EGD with trace esophageal and moderate gastric varix, no stigmata of bleeding.      RECOMMENDATIONS:  Advance diet.  2 g sodium restriction.  Continue PPI  Trend H/H and observe for further bleeding  Recommend carvedilol 3.125 mg PO BID on discharge if BP can tolerate  Further management of his HCC deferred to Dr. Sommer and his team  Patient working on establishing an appointment again with Dr. DUMONT. Unfortunately he has suffered some homelessness but  understands how important it is to follow up    No further interventions from the acute GI service. Please don't hesitate to contact us with any questions or concerns.     Core Quality Measures   Reviewed items::  Labs, Medications and Radiology reports reviewed

## 2023-11-08 NOTE — PROGRESS NOTES
Hospital Medicine Daily Progress Note    Date of Service  11/7/2023    Chief Complaint  Tyrone Giraldo is a 63 y.o. male admitted 11/5/2023 with fatigue    Hospital Course  Patient is a 63 y.o. male with past medical history of hepatocellular carcinoma, esophageal varices, CAD status post stent who presented 11/5/2023 with weakness and fatigue.  History of hepatocellular carcinoma currently following with Dr. Melton.     Recent admission for melena 8/16 - 8/23, evaluated by GI here and was found to have esophageal varices s/p banding.  He was discharged on carvedilol, he was taking all his medications up until 2-3 weeks ago when he ran out.     S/p EGD on 11/6 noted with trace esophageal varices, no stigmata of recent bleeding.    Interval Problem Update  Axox3, denies pain, no bm in 8 days, + flatus, agrees to enema - discussed with  nursing, h/h in stable range, tolerating clears, vitals and exam stable. ROS otherwise negative.     Need close monitoring of blood counts, electrolytes and renal function due to potential of organ dysfunction due to blood loss anemia.  Requiring IV Protonix, IV octreotide.  High risk of deterioration need to be monitor closely under telemetry .      I have discussed this patient's plan of care and discharge plan at IDT rounds today with Case Management, Nursing, Nursing leadership, and other members of the IDT team.    Consultants/Specialty  GI    Code Status  Full Code    Disposition  The patient is not medically cleared for discharge to home or a post-acute facility.      I have placed the appropriate orders for post-discharge needs.    Review of Systems  Review of Systems   Constitutional:  Positive for malaise/fatigue. Negative for chills, diaphoresis, fever and weight loss.   HENT: Negative.  Negative for sore throat.    Eyes: Negative.  Negative for blurred vision.   Respiratory: Negative.  Negative for cough and shortness of breath.    Cardiovascular: Negative.   Negative for chest pain, palpitations and leg swelling.   Gastrointestinal:  Positive for constipation. Negative for abdominal pain, blood in stool, melena, nausea and vomiting.   Genitourinary: Negative.  Negative for dysuria.   Musculoskeletal: Negative.  Negative for myalgias.   Skin: Negative.  Negative for itching and rash.   Neurological:  Negative for dizziness, focal weakness, weakness and headaches.   Endo/Heme/Allergies: Negative.  Does not bruise/bleed easily.   Psychiatric/Behavioral: Negative.  Negative for depression, substance abuse and suicidal ideas.    All other systems reviewed and are negative.       Physical Exam  Temp:  [36.6 °C (97.9 °F)-37.2 °C (99 °F)] 36.6 °C (97.9 °F)  Pulse:  [77-94] 79  Resp:  [16-20] 16  BP: (102-116)/(52-64) 115/62  SpO2:  [92 %-95 %] 92 %    Physical Exam  Vitals and nursing note reviewed. Exam conducted with a chaperone present.   Constitutional:       General: He is not in acute distress.     Appearance: Normal appearance. He is not ill-appearing or diaphoretic.   HENT:      Head: Normocephalic.      Nose: Nose normal.      Mouth/Throat:      Mouth: Mucous membranes are moist.   Eyes:      Pupils: Pupils are equal, round, and reactive to light.   Cardiovascular:      Rate and Rhythm: Normal rate and regular rhythm.      Pulses: Normal pulses.      Heart sounds: Normal heart sounds.   Pulmonary:      Effort: Pulmonary effort is normal.      Breath sounds: Normal breath sounds.   Abdominal:      General: Abdomen is flat. Bowel sounds are normal. There is distension.      Palpations: Abdomen is soft. There is no mass.      Tenderness: There is no abdominal tenderness. There is no guarding or rebound.      Hernia: No hernia is present.   Musculoskeletal:         General: No swelling or deformity. Normal range of motion.      Right lower leg: No edema.      Left lower leg: No edema.   Skin:     General: Skin is warm and dry.      Capillary Refill: Capillary refill takes  less than 2 seconds.   Neurological:      General: No focal deficit present.      Mental Status: He is alert and oriented to person, place, and time. Mental status is at baseline.      Cranial Nerves: No cranial nerve deficit.   Psychiatric:         Mood and Affect: Mood normal.         Behavior: Behavior normal.         Fluids    Intake/Output Summary (Last 24 hours) at 11/7/2023 1756  Last data filed at 11/7/2023 1600  Gross per 24 hour   Intake 800 ml   Output 2925 ml   Net -2125 ml         Laboratory  Recent Labs     11/05/23  0318 11/05/23  0908 11/06/23  1517 11/06/23  2159 11/07/23  0617 11/07/23  1358   WBC 7.3   < > 4.4* 4.4* 4.0* 4.5*   RBC 2.60*   < > 3.07* 3.06* 2.98* 3.02*   HEMOGLOBIN 5.9*   < > 7.4* 7.6* 7.5* 7.5*   HEMATOCRIT 20.4*   < > 25.6* 25.1* 26.2* 25.1*   MCV 78.5*   < > 83.4 82.0 87.9 83.1   MCH 22.7*   < > 24.1* 24.8* 25.2* 24.8*   MCHC 28.9*   < > 28.9* 30.3* 28.6* 29.9*   RDW 52.0*   < > 53.4* 52.6* 58.4* 54.6*   PLATELETCT 128*   < > 100* 97* 102* 99*   MPV 12.8  --  11.8 12.3  --   --     < > = values in this interval not displayed.       Recent Labs     11/05/23  1456 11/06/23  0036 11/07/23  0617   SODIUM 131* 135 134*   POTASSIUM 3.7 4.2 4.0   CHLORIDE 99 103 104   CO2 23 24 22   GLUCOSE 142* 167* 142*   BUN 8 9 10   CREATININE 0.78 0.72 0.90   CALCIUM 7.6* 7.4* 7.0*       Recent Labs     11/05/23  0908   INR 1.14*                 Imaging  CT-ABDOMEN-PELVIS WITH   Final Result      1.  Cirrhosis.   2.  Redemonstration of a hepatic segment 6 mass, likely HCC. Progression of disease with likely metastatic disease to the more inferior aspect of segment 6 and the right adrenal gland.   3.  Increased size of the upper abdominal lymphadenopathy, likely metastatic.   4.  Sequela of portal hypertension including gastric varices, trace perihepatic varices, and splenomegaly.   5.  Multiple other low-density hepatic lesions, cysts versus metastatic disease.   6.  Cholelithiasis with thickened  wall, possibly reactive. Consider ultrasound as indicated.           Assessment/Plan  * Acute blood loss anemia- (present on admission)  Assessment & Plan  History of cirrhosis secondary to hepatocellular carcinoma and known esophageal varices, banding on last admission 08/2023  1 unit PRBC pending.   Trend Hgb including post transfusion, transfuse if less than 7    Constipation  Assessment & Plan  No reported obstruction or inflammation on CT abdomen  Non-tender, no nausea  Bowel protocol      Homelessness- (present on admission)  Assessment & Plan  Meds to bed on discharge    Hypokalemia- (present on admission)  Assessment & Plan  Resolved    Tobacco abuse- (present on admission)  Assessment & Plan  I spent nearly  4 minutes on Tobacco cessation education and counseling.   I Discussed the benefits of quitting smoking and risks of continued smoking including cardiovascular disease, cancer and COPD.   Discussed options of nicotine patch.  Nicotine patch ordered    Secondary esophageal varices with bleeding (HCC)- (present on admission)  Assessment & Plan  Comes in with significant anemia, hemoglobin on presentation 5.9.  History of esophageal varices and melena in the past  S/p banding 08/2023 and has been on carvedilol up until a few weeks ago after running out of medications    11/6/2023    S/p 2 unit PRBC transfusion  Hemoglobin stable around 8  S/p EGD on 11/6 noted with trace esophageal varices, no stigmata of recent bleeding.  Ceftriaxone, octreotide drip, pantoprazole  Case discussed with gastroenterology Dr. Og.  Monitor H&H closely      Other cirrhosis of liver (HCC)- (present on admission)  Assessment & Plan  Secondary to hepatocellular carcinoma    Hepatocellular carcinoma (HCC)- (present on admission)  Assessment & Plan  Currently following with Dr. Melton.    Surgery for his hepatocellular carcinoma has not been planned yet as patient does not have stable housing, which he is working to  remedy    History of MI (myocardial infarction)- (present on admission)  Assessment & Plan  Stents in 2006 and 2009.  Has not been on aspirin for the past few weeks. Hold off for now due to acute blood loss anemia         VTE prophylaxis: SCD    I have performed a physical exam and reviewed and updated ROS and Plan today (11/7/2023). In review of yesterday's note (11/6/2023), there are no changes except as documented above.

## 2023-11-08 NOTE — CARE PLAN
The patient is Stable - Low risk of patient condition declining or worsening    Shift Goals  Clinical Goals: monitor h&h, have BM, titrate oxygen  Patient Goals: rest, be left alone  Family Goals: not present    Progress made toward(s) clinical / shift goals:      Problem: Pain - Standard  Goal: Alleviation of pain or a reduction in pain to the patient’s comfort goal  Outcome: Progressing     Problem: Knowledge Deficit - Standard  Goal: Patient and family/care givers will demonstrate understanding of plan of care, disease process/condition, diagnostic tests and medications  Outcome: Progressing     Problem: Fall Risk  Goal: Patient will remain free from falls  Outcome: Progressing

## 2023-11-09 ENCOUNTER — PHARMACY VISIT (OUTPATIENT)
Dept: PHARMACY | Facility: MEDICAL CENTER | Age: 63
End: 2023-11-09
Payer: COMMERCIAL

## 2023-11-09 VITALS
WEIGHT: 160.94 LBS | DIASTOLIC BLOOD PRESSURE: 74 MMHG | TEMPERATURE: 98.2 F | OXYGEN SATURATION: 93 % | HEIGHT: 72 IN | RESPIRATION RATE: 16 BRPM | BODY MASS INDEX: 21.8 KG/M2 | HEART RATE: 66 BPM | SYSTOLIC BLOOD PRESSURE: 118 MMHG

## 2023-11-09 PROBLEM — D62 ACUTE BLOOD LOSS ANEMIA: Status: RESOLVED | Noted: 2023-08-16 | Resolved: 2023-11-09

## 2023-11-09 PROBLEM — E87.6 HYPOKALEMIA: Status: RESOLVED | Noted: 2023-11-05 | Resolved: 2023-11-09

## 2023-11-09 PROBLEM — K59.00 CONSTIPATION: Status: RESOLVED | Noted: 2023-11-05 | Resolved: 2023-11-09

## 2023-11-09 LAB
ANION GAP SERPL CALC-SCNC: 10 MMOL/L (ref 7–16)
BASOPHILS # BLD AUTO: 0.5 % (ref 0–1.8)
BASOPHILS # BLD AUTO: 0.8 % (ref 0–1.8)
BASOPHILS # BLD: 0.02 K/UL (ref 0–0.12)
BASOPHILS # BLD: 0.03 K/UL (ref 0–0.12)
BUN SERPL-MCNC: 10 MG/DL (ref 8–22)
CALCIUM SERPL-MCNC: 7.5 MG/DL (ref 8.5–10.5)
CHLORIDE SERPL-SCNC: 102 MMOL/L (ref 96–112)
CO2 SERPL-SCNC: 24 MMOL/L (ref 20–33)
CREAT SERPL-MCNC: 0.7 MG/DL (ref 0.5–1.4)
EOSINOPHIL # BLD AUTO: 0.08 K/UL (ref 0–0.51)
EOSINOPHIL # BLD AUTO: 0.14 K/UL (ref 0–0.51)
EOSINOPHIL NFR BLD: 2 % (ref 0–6.9)
EOSINOPHIL NFR BLD: 3.6 % (ref 0–6.9)
ERYTHROCYTE [DISTWIDTH] IN BLOOD BY AUTOMATED COUNT: 56.3 FL (ref 35.9–50)
ERYTHROCYTE [DISTWIDTH] IN BLOOD BY AUTOMATED COUNT: 56.9 FL (ref 35.9–50)
GFR SERPLBLD CREATININE-BSD FMLA CKD-EPI: 102 ML/MIN/1.73 M 2
GLUCOSE SERPL-MCNC: 234 MG/DL (ref 65–99)
HCT VFR BLD AUTO: 25.7 % (ref 42–52)
HCT VFR BLD AUTO: 27 % (ref 42–52)
HGB BLD-MCNC: 7.5 G/DL (ref 14–18)
HGB BLD-MCNC: 7.9 G/DL (ref 14–18)
IMM GRANULOCYTES # BLD AUTO: 0.01 K/UL (ref 0–0.11)
IMM GRANULOCYTES # BLD AUTO: 0.03 K/UL (ref 0–0.11)
IMM GRANULOCYTES NFR BLD AUTO: 0.3 % (ref 0–0.9)
IMM GRANULOCYTES NFR BLD AUTO: 0.8 % (ref 0–0.9)
LYMPHOCYTES # BLD AUTO: 0.63 K/UL (ref 1–4.8)
LYMPHOCYTES # BLD AUTO: 0.64 K/UL (ref 1–4.8)
LYMPHOCYTES NFR BLD: 16 % (ref 22–41)
LYMPHOCYTES NFR BLD: 16.4 % (ref 22–41)
MCH RBC QN AUTO: 24.5 PG (ref 27–33)
MCH RBC QN AUTO: 24.5 PG (ref 27–33)
MCHC RBC AUTO-ENTMCNC: 29.2 G/DL (ref 32.3–36.5)
MCHC RBC AUTO-ENTMCNC: 29.3 G/DL (ref 32.3–36.5)
MCV RBC AUTO: 83.9 FL (ref 81.4–97.8)
MCV RBC AUTO: 84 FL (ref 81.4–97.8)
MONOCYTES # BLD AUTO: 0.31 K/UL (ref 0–0.85)
MONOCYTES # BLD AUTO: 0.36 K/UL (ref 0–0.85)
MONOCYTES NFR BLD AUTO: 7.8 % (ref 0–13.4)
MONOCYTES NFR BLD AUTO: 9.4 % (ref 0–13.4)
NEUTROPHILS # BLD AUTO: 2.65 K/UL (ref 1.82–7.42)
NEUTROPHILS # BLD AUTO: 2.94 K/UL (ref 1.82–7.42)
NEUTROPHILS NFR BLD: 69 % (ref 44–72)
NEUTROPHILS NFR BLD: 73.4 % (ref 44–72)
NRBC # BLD AUTO: 0 K/UL
NRBC # BLD AUTO: 0 K/UL
NRBC BLD-RTO: 0 /100 WBC (ref 0–0.2)
NRBC BLD-RTO: 0 /100 WBC (ref 0–0.2)
PLATELET # BLD AUTO: 93 K/UL (ref 164–446)
PLATELET # BLD AUTO: 93 K/UL (ref 164–446)
PLATELETS.RETICULATED NFR BLD AUTO: 17 % (ref 0.6–13.1)
PLATELETS.RETICULATED NFR BLD AUTO: 19.4 % (ref 0.6–13.1)
POTASSIUM SERPL-SCNC: 3.9 MMOL/L (ref 3.6–5.5)
RBC # BLD AUTO: 3.06 M/UL (ref 4.7–6.1)
RBC # BLD AUTO: 3.22 M/UL (ref 4.7–6.1)
SODIUM SERPL-SCNC: 136 MMOL/L (ref 135–145)
WBC # BLD AUTO: 3.8 K/UL (ref 4.8–10.8)
WBC # BLD AUTO: 4 K/UL (ref 4.8–10.8)

## 2023-11-09 PROCEDURE — RXMED WILLOW AMBULATORY MEDICATION CHARGE: Performed by: HOSPITALIST

## 2023-11-09 PROCEDURE — 700111 HCHG RX REV CODE 636 W/ 250 OVERRIDE (IP): Performed by: STUDENT IN AN ORGANIZED HEALTH CARE EDUCATION/TRAINING PROGRAM

## 2023-11-09 PROCEDURE — A9270 NON-COVERED ITEM OR SERVICE: HCPCS | Performed by: STUDENT IN AN ORGANIZED HEALTH CARE EDUCATION/TRAINING PROGRAM

## 2023-11-09 PROCEDURE — 80048 BASIC METABOLIC PNL TOTAL CA: CPT

## 2023-11-09 PROCEDURE — 700102 HCHG RX REV CODE 250 W/ 637 OVERRIDE(OP): Performed by: STUDENT IN AN ORGANIZED HEALTH CARE EDUCATION/TRAINING PROGRAM

## 2023-11-09 PROCEDURE — 85025 COMPLETE CBC W/AUTO DIFF WBC: CPT

## 2023-11-09 PROCEDURE — 99239 HOSP IP/OBS DSCHRG MGMT >30: CPT | Performed by: HOSPITALIST

## 2023-11-09 PROCEDURE — 85055 RETICULATED PLATELET ASSAY: CPT

## 2023-11-09 PROCEDURE — C9113 INJ PANTOPRAZOLE SODIUM, VIA: HCPCS | Performed by: STUDENT IN AN ORGANIZED HEALTH CARE EDUCATION/TRAINING PROGRAM

## 2023-11-09 RX ORDER — AMOXICILLIN 250 MG
2 CAPSULE ORAL 2 TIMES DAILY
Qty: 30 TABLET | Refills: 0 | Status: ON HOLD | OUTPATIENT
Start: 2023-11-09 | End: 2023-12-14

## 2023-11-09 RX ORDER — MIDODRINE HYDROCHLORIDE 5 MG/1
5 TABLET ORAL
Qty: 60 TABLET | Refills: 0 | Status: ON HOLD | OUTPATIENT
Start: 2023-11-09 | End: 2023-12-14

## 2023-11-09 RX ADMIN — NICOTINE 7 MG: 7 PATCH, EXTENDED RELEASE TRANSDERMAL at 05:45

## 2023-11-09 RX ADMIN — DOCUSATE SODIUM 50 MG AND SENNOSIDES 8.6 MG 2 TABLET: 8.6; 5 TABLET, FILM COATED ORAL at 05:45

## 2023-11-09 RX ADMIN — CEFTRIAXONE SODIUM 2000 MG: 10 INJECTION, POWDER, FOR SOLUTION INTRAVENOUS at 05:25

## 2023-11-09 RX ADMIN — PANTOPRAZOLE SODIUM 40 MG: 40 INJECTION, POWDER, FOR SOLUTION INTRAVENOUS at 05:45

## 2023-11-09 RX ADMIN — MIDODRINE HYDROCHLORIDE 5 MG: 5 TABLET ORAL at 08:36

## 2023-11-09 ASSESSMENT — PAIN DESCRIPTION - PAIN TYPE: TYPE: ACUTE PAIN

## 2023-11-09 ASSESSMENT — FIBROSIS 4 INDEX: FIB4 SCORE: 2.29

## 2023-11-09 NOTE — PROGRESS NOTES
Patient discharged and sent to [facility]. Ivs removed, patient still needing [id number], pharmacy notified. Patient left unit with all belongings to [facility].

## 2023-11-09 NOTE — DISCHARGE INSTRUCTIONS
Esophageal Varices    Esophageal varices are enlarged veins in the part of the body that moves food from the mouth to the stomach (esophagus). They develop when extra blood is forced to flow through these veins because the blood's normal flow is blocked. Without treatment, esophageal varices eventually break and bleed (hemorrhage), which can be life-threatening.  What are the causes?  This condition may be caused by:  Scarring of the liver due to alcoholism. This is the most common cause.  Long-term liver disease.  Severe heart failure.  A blood clot in a vein that supplies the liver.  A disease that causes inflammation in the organs and other body areas.  What are the signs or symptoms?  Esophageal varices usually do not cause symptoms unless they start to bleed. Symptoms of bleeding esophageal varices include:  Vomiting material that is bright red or that is black and looks like coffee grounds.  Coughing up blood.  Stools (feces) that look black and tarry.  Dizziness or light-headedness.  Low blood pressure.  Loss of consciousness.  How is this diagnosed?  This condition is diagnosed with a procedure called endoscopy. During endoscopy, your health care provider uses a flexible tube with a small camera on the end of it (endoscope) to look down your throat and examine your esophagus.  You may also have other tests, including:  Imaging tests, such as a CT scan or ultrasound.  Blood tests.  How is this treated?  This condition may be treated with:  Medicines. Medicines are usually used to treat varices that are not bleeding.  Procedures. Procedures are done to treat varices that are bleeding. They stop bleeding, or reduce pressure and the risk of bleeding. Procedures include:  Placing an elastic band around the varices to keep them from bleeding.  Replacing blood that you have lost due to bleeding. This may include getting a transfusion of blood or parts of blood, such as platelets or clotting factors.  You may be  given antibiotic medicine to help prevent infection.  Getting an injection into the varices that causes it to shrink and close (sclerotherapy). You may also be given medicines that tighten blood vessels or change blood flow.  Placing a balloon in the esophagus and inflating it. The balloon applies pressure to the bleeding veins to help stop the bleeding.  Placing a small tube within the veins in the liver. This decreases blood flow and pressure in the esophageal varices.  If other treatments do not work, you may need a liver transplant.  Follow these instructions at home:  Medicines  Take over-the-counter and prescription medicines only as told by your health care provider.  If you were prescribed an antibiotic medicine, take it as told by your health care provider. Do not stop taking the antibiotic even if you start to feel better.  Do not take any NSAIDs (such as aspirin or ibuprofen) before first getting approval from your health care provider.  General instructions    Do not drink alcohol.  Return to your normal activities as told by your health care provider. Ask your health care provider what activities are safe for you.  Avoid vigorous physical activity. Ask your health care provider what exercises are safe for you.  Keep all follow-up visits.  Contact a health care provider if:  You have pain in the abdomen.  You are unable to eat or drink.  Get help right away if:  You vomit blood or have blood in your stool.  You have stools that look black or tarry.  You have chest pain.  You feel dizzy or have low blood pressure.  You lose consciousness.  These symptoms may represent a serious problem that is an emergency. Do not wait to see if the symptoms will go away. Get medical help right away. Call your local emergency services (911 in the [place] Do not drive yourself to the hospital.  Summary  Esophageal varices are enlarged veins in the esophagus, the part of your body that moves food from your mouth to your  stomach.  Without treatment, esophageal varices eventually break and bleed, which can be life-threatening.  Esophageal varices usually do not cause symptoms unless they start to bleed.  Keep all follow-up visits. This is important.  This information is not intended to replace advice given to you by your health care provider. Make sure you discuss any questions you have with your health care provider.  Document Revised: 04/06/2021 Document Reviewed: 04/06/2021  [employer] Patient Education © 2023 [employer]  Upper Endoscopy, Adult, Care After  After the procedure, it is common to have a sore throat. It is also common to have:  Mild stomach pain or discomfort.  Bloating.  Nausea.  Follow these instructions at home:  The instructions below may help you care for yourself at home. Your health care provider may give you more instructions. If you have questions, ask your health care provider.  If you were given a sedative during the procedure, it can affect you for several hours. Do not drive or operate machinery until your health care provider says that it is safe.  If you will be going home right after the procedure, plan to have a responsible adult:  Take you home from the hospital or clinic. You will not be allowed to drive.  Care for you for the time you are told.  Follow instructions from your health care provider about what you may eat and drink.  Return to your normal activities as told by your health care provider. Ask your health care provider what activities are safe for you.  Take over-the-counter and prescription medicines only as told by your health care provider.  Contact a health care provider if you:  Have a sore throat that lasts longer than one day.  Have trouble swallowing.  Have a fever.  Get help right away if you:  Vomit blood or your vomit looks like coffee grounds.  Have bloody, black, or tarry stools.  Have a very bad sore throat or you cannot swallow.  Have difficulty breathing or very bad pain  in your chest or abdomen.  These symptoms may be an emergency. Get help right away. Call 911.  Do not wait to see if the symptoms will go away.  Do not drive yourself to the hospital.  Summary  After the procedure, it is common to have a sore throat, mild stomach discomfort, bloating, and nausea.  If you were given a sedative during the procedure, it can affect you for several hours. Do not drive until your health care provider says that it is safe.  Follow instructions from your health care provider about what you may eat and drink.  Return to your normal activities as told by your health care provider.  This information is not intended to replace advice given to you by your health care provider. Make sure you discuss any questions you have with your health care provider.  Document Revised: 03/29/2023 Document Reviewed: 03/29/2023  Elsevier Patient Education © 2023 [employer]

## 2023-11-09 NOTE — PROGRESS NOTES
Pt in DCL. IV removed. Meds to beds delivered. Paperwork gone through and signed. No further questions at this time.

## 2023-11-09 NOTE — CARE PLAN
The patient is Stable - Low risk of patient condition declining or worsening    Shift Goals  Clinical Goals: Monitor H&H  Patient Goals: Rest  Family Goals: GIOVANNI    Progress made toward(s) clinical / shift goals:      Problem: Pain - Standard  Goal: Alleviation of pain or a reduction in pain to the patient’s comfort goal  Outcome: Progressing     Problem: Knowledge Deficit - Standard  Goal: Patient and family/care givers will demonstrate understanding of plan of care, disease process/condition, diagnostic tests and medications  Outcome: Progressing     Problem: Fall Risk  Goal: Patient will remain free from falls  Outcome: Progressing     Problem: Psychosocial  Goal: Patient's level of anxiety will decrease  Outcome: Progressing

## 2023-11-29 ENCOUNTER — APPOINTMENT (OUTPATIENT)
Dept: ADMISSIONS | Facility: MEDICAL CENTER | Age: 63
End: 2023-11-29
Attending: SURGERY
Payer: MEDICARE

## 2023-12-12 ENCOUNTER — PRE-ADMISSION TESTING (OUTPATIENT)
Dept: ADMISSIONS | Facility: MEDICAL CENTER | Age: 63
End: 2023-12-12
Attending: SURGERY
Payer: MEDICARE

## 2023-12-12 NOTE — OR NURSING
RN telephone preadmission appointment completed with Tyrone Giraldo. Tyrone states he is unable to come in for testing prior to surgery due to transportation issues. I called and spoke to Cris,  for Dr. Jiang who states she will notify Dr. Jiang of above. Tyrone states he has contact information for Beba Duvall the oncology nurse navigator for resources.

## 2023-12-13 ENCOUNTER — ANESTHESIA EVENT (OUTPATIENT)
Dept: SURGERY | Facility: MEDICAL CENTER | Age: 63
End: 2023-12-13
Payer: MEDICARE

## 2023-12-14 ENCOUNTER — HOSPITAL ENCOUNTER (OUTPATIENT)
Facility: MEDICAL CENTER | Age: 63
End: 2023-12-14
Attending: SURGERY | Admitting: SURGERY
Payer: MEDICARE

## 2023-12-14 ENCOUNTER — ANESTHESIA (OUTPATIENT)
Dept: SURGERY | Facility: MEDICAL CENTER | Age: 63
End: 2023-12-14
Payer: MEDICARE

## 2023-12-14 ENCOUNTER — PHARMACY VISIT (OUTPATIENT)
Dept: PHARMACY | Facility: MEDICAL CENTER | Age: 63
End: 2023-12-14
Payer: MEDICARE

## 2023-12-14 VITALS
OXYGEN SATURATION: 93 % | TEMPERATURE: 96.7 F | DIASTOLIC BLOOD PRESSURE: 70 MMHG | SYSTOLIC BLOOD PRESSURE: 123 MMHG | HEART RATE: 83 BPM | HEIGHT: 72 IN | RESPIRATION RATE: 15 BRPM | WEIGHT: 173.72 LBS | BODY MASS INDEX: 23.53 KG/M2

## 2023-12-14 DIAGNOSIS — R73.9 HYPERGLYCEMIA: ICD-10-CM

## 2023-12-14 DIAGNOSIS — K76.6 PORTAL HYPERTENSION WITH ESOPHAGEAL VARICES (HCC): ICD-10-CM

## 2023-12-14 DIAGNOSIS — R16.0 LIVER MASS: ICD-10-CM

## 2023-12-14 DIAGNOSIS — R53.1 WEAKNESS: ICD-10-CM

## 2023-12-14 DIAGNOSIS — K74.60 CIRRHOSIS OF LIVER WITH ASCITES, UNSPECIFIED HEPATIC CIRRHOSIS TYPE (HCC): ICD-10-CM

## 2023-12-14 DIAGNOSIS — Z59.00 HOMELESSNESS: ICD-10-CM

## 2023-12-14 DIAGNOSIS — K92.1 MELENA: ICD-10-CM

## 2023-12-14 DIAGNOSIS — C22.0 HEPATOCELLULAR CARCINOMA (HCC): ICD-10-CM

## 2023-12-14 DIAGNOSIS — D69.6 THROMBOCYTOPENIA (HCC): ICD-10-CM

## 2023-12-14 DIAGNOSIS — I25.10 CORONARY ARTERY DISEASE INVOLVING NATIVE CORONARY ARTERY OF NATIVE HEART WITHOUT ANGINA PECTORIS: ICD-10-CM

## 2023-12-14 DIAGNOSIS — Z72.0 TOBACCO ABUSE: ICD-10-CM

## 2023-12-14 DIAGNOSIS — R18.8 CIRRHOSIS OF LIVER WITH ASCITES, UNSPECIFIED HEPATIC CIRRHOSIS TYPE (HCC): ICD-10-CM

## 2023-12-14 DIAGNOSIS — I85.00 PORTAL HYPERTENSION WITH ESOPHAGEAL VARICES (HCC): ICD-10-CM

## 2023-12-14 DIAGNOSIS — I25.2 HISTORY OF MI (MYOCARDIAL INFARCTION): ICD-10-CM

## 2023-12-14 DIAGNOSIS — I85.11 SECONDARY ESOPHAGEAL VARICES WITH BLEEDING (HCC): ICD-10-CM

## 2023-12-14 LAB
ALBUMIN SERPL BCP-MCNC: 3.6 G/DL (ref 3.2–4.9)
ALBUMIN/GLOB SERPL: 0.9 G/DL
ALP SERPL-CCNC: 96 U/L (ref 30–99)
ALT SERPL-CCNC: 38 U/L (ref 2–50)
ANION GAP SERPL CALC-SCNC: 9 MMOL/L (ref 7–16)
AST SERPL-CCNC: 42 U/L (ref 12–45)
BILIRUB SERPL-MCNC: 0.6 MG/DL (ref 0.1–1.5)
BUN SERPL-MCNC: 13 MG/DL (ref 8–22)
CALCIUM ALBUM COR SERPL-MCNC: 8.6 MG/DL (ref 8.5–10.5)
CALCIUM SERPL-MCNC: 8.3 MG/DL (ref 8.5–10.5)
CHLORIDE SERPL-SCNC: 103 MMOL/L (ref 96–112)
CO2 SERPL-SCNC: 25 MMOL/L (ref 20–33)
CREAT SERPL-MCNC: 0.77 MG/DL (ref 0.5–1.4)
ERYTHROCYTE [DISTWIDTH] IN BLOOD BY AUTOMATED COUNT: 57.2 FL (ref 35.9–50)
GFR SERPLBLD CREATININE-BSD FMLA CKD-EPI: 99 ML/MIN/1.73 M 2
GLOBULIN SER CALC-MCNC: 3.8 G/DL (ref 1.9–3.5)
GLUCOSE SERPL-MCNC: 136 MG/DL (ref 65–99)
HCT VFR BLD AUTO: 29 % (ref 42–52)
HGB BLD-MCNC: 8.1 G/DL (ref 14–18)
INR PPP: 1.02 (ref 0.87–1.13)
MCH RBC QN AUTO: 21.4 PG (ref 27–33)
MCHC RBC AUTO-ENTMCNC: 27.9 G/DL (ref 32.3–36.5)
MCV RBC AUTO: 76.5 FL (ref 81.4–97.8)
PLATELET # BLD AUTO: 121 K/UL (ref 164–446)
POTASSIUM SERPL-SCNC: 3.5 MMOL/L (ref 3.6–5.5)
PROT SERPL-MCNC: 7.4 G/DL (ref 6–8.2)
PROTHROMBIN TIME: 13.5 SEC (ref 12–14.6)
RBC # BLD AUTO: 3.79 M/UL (ref 4.7–6.1)
SODIUM SERPL-SCNC: 137 MMOL/L (ref 135–145)
WBC # BLD AUTO: 4.3 K/UL (ref 4.8–10.8)

## 2023-12-14 PROCEDURE — 160009 HCHG ANES TIME/MIN: Performed by: SURGERY

## 2023-12-14 PROCEDURE — 160035 HCHG PACU - 1ST 60 MINS PHASE I: Performed by: SURGERY

## 2023-12-14 PROCEDURE — 700101 HCHG RX REV CODE 250: Mod: UD | Performed by: SURGERY

## 2023-12-14 PROCEDURE — 47370 LAPARO ABLATE LIVER TUMOR RF: CPT | Performed by: SURGERY

## 2023-12-14 PROCEDURE — RXMED WILLOW AMBULATORY MEDICATION CHARGE: Performed by: SURGERY

## 2023-12-14 PROCEDURE — 47370 LAPARO ABLATE LIVER TUMOR RF: CPT | Mod: AS | Performed by: NURSE PRACTITIONER

## 2023-12-14 PROCEDURE — 160046 HCHG PACU - 1ST 60 MINS PHASE II: Performed by: SURGERY

## 2023-12-14 PROCEDURE — 160025 RECOVERY II MINUTES (STATS): Performed by: SURGERY

## 2023-12-14 PROCEDURE — 700105 HCHG RX REV CODE 258: Mod: UD | Performed by: SURGERY

## 2023-12-14 PROCEDURE — 80053 COMPREHEN METABOLIC PANEL: CPT

## 2023-12-14 PROCEDURE — 85027 COMPLETE CBC AUTOMATED: CPT

## 2023-12-14 PROCEDURE — 160048 HCHG OR STATISTICAL LEVEL 1-5: Performed by: SURGERY

## 2023-12-14 PROCEDURE — 700102 HCHG RX REV CODE 250 W/ 637 OVERRIDE(OP): Mod: UD | Performed by: STUDENT IN AN ORGANIZED HEALTH CARE EDUCATION/TRAINING PROGRAM

## 2023-12-14 PROCEDURE — 160041 HCHG SURGERY MINUTES - EA ADDL 1 MIN LEVEL 4: Performed by: SURGERY

## 2023-12-14 PROCEDURE — 160029 HCHG SURGERY MINUTES - 1ST 30 MINS LEVEL 4: Performed by: SURGERY

## 2023-12-14 PROCEDURE — 700111 HCHG RX REV CODE 636 W/ 250 OVERRIDE (IP): Mod: JZ,UD | Performed by: STUDENT IN AN ORGANIZED HEALTH CARE EDUCATION/TRAINING PROGRAM

## 2023-12-14 PROCEDURE — A9270 NON-COVERED ITEM OR SERVICE: HCPCS | Mod: UD | Performed by: STUDENT IN AN ORGANIZED HEALTH CARE EDUCATION/TRAINING PROGRAM

## 2023-12-14 PROCEDURE — 85610 PROTHROMBIN TIME: CPT

## 2023-12-14 PROCEDURE — 36415 COLL VENOUS BLD VENIPUNCTURE: CPT

## 2023-12-14 PROCEDURE — 160002 HCHG RECOVERY MINUTES (STAT): Performed by: SURGERY

## 2023-12-14 PROCEDURE — 160036 HCHG PACU - EA ADDL 30 MINS PHASE I: Performed by: SURGERY

## 2023-12-14 PROCEDURE — 700101 HCHG RX REV CODE 250: Mod: UD | Performed by: STUDENT IN AN ORGANIZED HEALTH CARE EDUCATION/TRAINING PROGRAM

## 2023-12-14 RX ORDER — HYDROMORPHONE HYDROCHLORIDE 1 MG/ML
0.4 INJECTION, SOLUTION INTRAMUSCULAR; INTRAVENOUS; SUBCUTANEOUS
Status: DISCONTINUED | OUTPATIENT
Start: 2023-12-14 | End: 2023-12-14 | Stop reason: HOSPADM

## 2023-12-14 RX ORDER — OXYCODONE HCL 5 MG/5 ML
5 SOLUTION, ORAL ORAL
Status: DISCONTINUED | OUTPATIENT
Start: 2023-12-14 | End: 2023-12-14 | Stop reason: HOSPADM

## 2023-12-14 RX ORDER — ONDANSETRON 2 MG/ML
4 INJECTION INTRAMUSCULAR; INTRAVENOUS
Status: DISCONTINUED | OUTPATIENT
Start: 2023-12-14 | End: 2023-12-14 | Stop reason: HOSPADM

## 2023-12-14 RX ORDER — ROCURONIUM BROMIDE 10 MG/ML
INJECTION, SOLUTION INTRAVENOUS PRN
Status: DISCONTINUED | OUTPATIENT
Start: 2023-12-14 | End: 2023-12-14 | Stop reason: SURG

## 2023-12-14 RX ORDER — MEPERIDINE HYDROCHLORIDE 25 MG/ML
6.25 INJECTION INTRAMUSCULAR; INTRAVENOUS; SUBCUTANEOUS
Status: DISCONTINUED | OUTPATIENT
Start: 2023-12-14 | End: 2023-12-14 | Stop reason: HOSPADM

## 2023-12-14 RX ORDER — SODIUM CHLORIDE, SODIUM LACTATE, POTASSIUM CHLORIDE, CALCIUM CHLORIDE 600; 310; 30; 20 MG/100ML; MG/100ML; MG/100ML; MG/100ML
INJECTION, SOLUTION INTRAVENOUS CONTINUOUS
Status: DISCONTINUED | OUTPATIENT
Start: 2023-12-14 | End: 2023-12-14 | Stop reason: HOSPADM

## 2023-12-14 RX ORDER — EPHEDRINE SULFATE 50 MG/ML
5 INJECTION, SOLUTION INTRAVENOUS
Status: DISCONTINUED | OUTPATIENT
Start: 2023-12-14 | End: 2023-12-14 | Stop reason: HOSPADM

## 2023-12-14 RX ORDER — HYDRALAZINE HYDROCHLORIDE 20 MG/ML
5 INJECTION INTRAMUSCULAR; INTRAVENOUS
Status: DISCONTINUED | OUTPATIENT
Start: 2023-12-14 | End: 2023-12-14 | Stop reason: HOSPADM

## 2023-12-14 RX ORDER — PROMETHAZINE HYDROCHLORIDE 25 MG/1
12.5 TABLET ORAL EVERY 6 HOURS PRN
Qty: 30 TABLET | Refills: 0 | Status: SHIPPED | OUTPATIENT
Start: 2023-12-14

## 2023-12-14 RX ORDER — SODIUM CHLORIDE, SODIUM LACTATE, POTASSIUM CHLORIDE, CALCIUM CHLORIDE 600; 310; 30; 20 MG/100ML; MG/100ML; MG/100ML; MG/100ML
INJECTION, SOLUTION INTRAVENOUS CONTINUOUS
Status: ACTIVE | OUTPATIENT
Start: 2023-12-14 | End: 2023-12-14

## 2023-12-14 RX ORDER — ONDANSETRON 2 MG/ML
INJECTION INTRAMUSCULAR; INTRAVENOUS PRN
Status: DISCONTINUED | OUTPATIENT
Start: 2023-12-14 | End: 2023-12-14 | Stop reason: SURG

## 2023-12-14 RX ORDER — HALOPERIDOL 5 MG/ML
1 INJECTION INTRAMUSCULAR
Status: DISCONTINUED | OUTPATIENT
Start: 2023-12-14 | End: 2023-12-14 | Stop reason: HOSPADM

## 2023-12-14 RX ORDER — LABETALOL HYDROCHLORIDE 5 MG/ML
5 INJECTION, SOLUTION INTRAVENOUS
Status: DISCONTINUED | OUTPATIENT
Start: 2023-12-14 | End: 2023-12-14 | Stop reason: HOSPADM

## 2023-12-14 RX ORDER — HYDROMORPHONE HYDROCHLORIDE 1 MG/ML
0.1 INJECTION, SOLUTION INTRAMUSCULAR; INTRAVENOUS; SUBCUTANEOUS
Status: DISCONTINUED | OUTPATIENT
Start: 2023-12-14 | End: 2023-12-14 | Stop reason: HOSPADM

## 2023-12-14 RX ORDER — LIDOCAINE HYDROCHLORIDE 40 MG/ML
SOLUTION TOPICAL PRN
Status: DISCONTINUED | OUTPATIENT
Start: 2023-12-14 | End: 2023-12-14 | Stop reason: SURG

## 2023-12-14 RX ORDER — LIDOCAINE HYDROCHLORIDE 10 MG/ML
INJECTION, SOLUTION EPIDURAL; INFILTRATION; INTRACAUDAL; PERINEURAL PRN
Status: DISCONTINUED | OUTPATIENT
Start: 2023-12-14 | End: 2023-12-14 | Stop reason: SURG

## 2023-12-14 RX ORDER — DEXAMETHASONE SODIUM PHOSPHATE 4 MG/ML
INJECTION, SOLUTION INTRA-ARTICULAR; INTRALESIONAL; INTRAMUSCULAR; INTRAVENOUS; SOFT TISSUE PRN
Status: DISCONTINUED | OUTPATIENT
Start: 2023-12-14 | End: 2023-12-14 | Stop reason: SURG

## 2023-12-14 RX ORDER — HYDROMORPHONE HYDROCHLORIDE 1 MG/ML
0.2 INJECTION, SOLUTION INTRAMUSCULAR; INTRAVENOUS; SUBCUTANEOUS
Status: DISCONTINUED | OUTPATIENT
Start: 2023-12-14 | End: 2023-12-14 | Stop reason: HOSPADM

## 2023-12-14 RX ORDER — MAGNESIUM SULFATE HEPTAHYDRATE 40 MG/ML
INJECTION, SOLUTION INTRAVENOUS PRN
Status: DISCONTINUED | OUTPATIENT
Start: 2023-12-14 | End: 2023-12-14 | Stop reason: SURG

## 2023-12-14 RX ORDER — CELECOXIB 200 MG/1
200 CAPSULE ORAL 2 TIMES DAILY
Qty: 30 CAPSULE | Refills: 0 | Status: SHIPPED | OUTPATIENT
Start: 2023-12-14

## 2023-12-14 RX ORDER — MIDAZOLAM HYDROCHLORIDE 1 MG/ML
INJECTION INTRAMUSCULAR; INTRAVENOUS PRN
Status: DISCONTINUED | OUTPATIENT
Start: 2023-12-14 | End: 2023-12-14 | Stop reason: SURG

## 2023-12-14 RX ORDER — TRAMADOL HYDROCHLORIDE 50 MG/1
50-100 TABLET ORAL EVERY 4 HOURS PRN
Qty: 40 TABLET | Refills: 0 | Status: SHIPPED | OUTPATIENT
Start: 2023-12-14 | End: 2023-12-24

## 2023-12-14 RX ORDER — ACETAMINOPHEN 500 MG
1000 TABLET ORAL ONCE
Status: COMPLETED | OUTPATIENT
Start: 2023-12-14 | End: 2023-12-14

## 2023-12-14 RX ORDER — CEFAZOLIN SODIUM 1 G/3ML
INJECTION, POWDER, FOR SOLUTION INTRAMUSCULAR; INTRAVENOUS PRN
Status: DISCONTINUED | OUTPATIENT
Start: 2023-12-14 | End: 2023-12-14 | Stop reason: SURG

## 2023-12-14 RX ORDER — BUPIVACAINE HYDROCHLORIDE AND EPINEPHRINE 5; 5 MG/ML; UG/ML
INJECTION, SOLUTION PERINEURAL
Status: DISCONTINUED | OUTPATIENT
Start: 2023-12-14 | End: 2023-12-14 | Stop reason: HOSPADM

## 2023-12-14 RX ORDER — DIPHENHYDRAMINE HYDROCHLORIDE 50 MG/ML
12.5 INJECTION INTRAMUSCULAR; INTRAVENOUS
Status: DISCONTINUED | OUTPATIENT
Start: 2023-12-14 | End: 2023-12-14 | Stop reason: HOSPADM

## 2023-12-14 RX ORDER — METOPROLOL TARTRATE 1 MG/ML
1 INJECTION, SOLUTION INTRAVENOUS
Status: DISCONTINUED | OUTPATIENT
Start: 2023-12-14 | End: 2023-12-14 | Stop reason: HOSPADM

## 2023-12-14 RX ORDER — OXYCODONE HCL 5 MG/5 ML
10 SOLUTION, ORAL ORAL
Status: DISCONTINUED | OUTPATIENT
Start: 2023-12-14 | End: 2023-12-14 | Stop reason: HOSPADM

## 2023-12-14 RX ADMIN — SODIUM CHLORIDE, POTASSIUM CHLORIDE, SODIUM LACTATE AND CALCIUM CHLORIDE: 600; 310; 30; 20 INJECTION, SOLUTION INTRAVENOUS at 05:59

## 2023-12-14 RX ADMIN — FENTANYL CITRATE 50 MCG: 50 INJECTION, SOLUTION INTRAMUSCULAR; INTRAVENOUS at 07:07

## 2023-12-14 RX ADMIN — FENTANYL CITRATE 50 MCG: 50 INJECTION, SOLUTION INTRAMUSCULAR; INTRAVENOUS at 07:23

## 2023-12-14 RX ADMIN — LIDOCAINE HYDROCHLORIDE 4 ML: 40 SOLUTION TOPICAL at 07:08

## 2023-12-14 RX ADMIN — FENTANYL CITRATE 50 MCG: 50 INJECTION, SOLUTION INTRAMUSCULAR; INTRAVENOUS at 07:52

## 2023-12-14 RX ADMIN — MIDAZOLAM HYDROCHLORIDE 2 MG: 1 INJECTION, SOLUTION INTRAMUSCULAR; INTRAVENOUS at 07:04

## 2023-12-14 RX ADMIN — LIDOCAINE HYDROCHLORIDE 50 MG: 10 INJECTION, SOLUTION EPIDURAL; INFILTRATION; INTRACAUDAL; PERINEURAL at 07:07

## 2023-12-14 RX ADMIN — PROPOFOL 200 MG: 10 INJECTION, EMULSION INTRAVENOUS at 07:07

## 2023-12-14 RX ADMIN — CEFAZOLIN 2 G: 1 INJECTION, POWDER, FOR SOLUTION INTRAMUSCULAR; INTRAVENOUS at 07:11

## 2023-12-14 RX ADMIN — ACETAMINOPHEN 1000 MG: 500 TABLET, FILM COATED ORAL at 05:57

## 2023-12-14 RX ADMIN — ROCURONIUM BROMIDE 40 MG: 50 INJECTION, SOLUTION INTRAVENOUS at 07:07

## 2023-12-14 RX ADMIN — DEXAMETHASONE SODIUM PHOSPHATE 8 MG: 4 INJECTION INTRA-ARTICULAR; INTRALESIONAL; INTRAMUSCULAR; INTRAVENOUS; SOFT TISSUE at 07:11

## 2023-12-14 RX ADMIN — ONDANSETRON 4 MG: 2 INJECTION INTRAMUSCULAR; INTRAVENOUS at 07:46

## 2023-12-14 RX ADMIN — MAGNESIUM SULFATE HEPTAHYDRATE 2 G: 2 INJECTION, SOLUTION INTRAVENOUS at 07:27

## 2023-12-14 RX ADMIN — LIDOCAINE HYDROCHLORIDE 0.5 ML: 10 INJECTION, SOLUTION EPIDURAL; INFILTRATION; INTRACAUDAL at 05:57

## 2023-12-14 RX ADMIN — SUGAMMADEX 200 MG: 100 INJECTION, SOLUTION INTRAVENOUS at 07:50

## 2023-12-14 SDOH — ECONOMIC STABILITY - HOUSING INSECURITY: HOMELESSNESS UNSPECIFIED: Z59.00

## 2023-12-14 ASSESSMENT — PAIN DESCRIPTION - PAIN TYPE
TYPE: ACUTE PAIN

## 2023-12-14 ASSESSMENT — FIBROSIS 4 INDEX: FIB4 SCORE: 2.29

## 2023-12-14 NOTE — DISCHARGE INSTRUCTIONS
HOME CARE INSTRUCTIONS    ACTIVITY: Rest and take it easy for the first 24 hours.  A responsible adult is recommended to remain with you during that time.  It is normal to feel sleepy.  We encourage you to not do anything that requires balance, judgment or coordination.    FOR 24 HOURS DO NOT:  Drive, operate machinery or run household appliances.  Drink beer or alcoholic beverages.  Make important decisions or sign legal documents.    SPECIAL INSTRUCTIONS:   OK to shower in AM with dressings on  DC dressing in 6 days  Follow-up with office MA for staple removal after 10-14 days  Follow up with Dr. Jiang after completing 1 month  CT of liver imaging  OK to shower in AM with dressings on  Call MD if temperature greater than 101.5 °F or worsening pain.    DIET: To avoid nausea, slowly advance diet as tolerated, avoiding spicy or greasy foods for the first day.  Add more substantial food to your diet according to your physician's instructions.  Babies can be fed formula or breast milk as soon as they are hungry.  INCREASE FLUIDS AND FIBER TO AVOID CONSTIPATION.    MEDICATIONS: Resume taking daily medication.  Take prescribed pain medication with food.  If no medication is prescribed, you may take non-aspirin pain medication if needed.  PAIN MEDICATION CAN BE VERY CONSTIPATING.  Take a stool softener or laxative such as senokot, pericolace, or milk of magnesia if needed.    Prescription given for Celebrex, Promethazine, Tramadol.  Last pain medication given at Tylenol at 0557.    A follow-up appointment should be arranged with your doctor in 10-14 days; call to schedule.    You should CALL YOUR PHYSICIAN if you develop:  Fever greater than 101 degrees F.  Pain not relieved by medication, or persistent nausea or vomiting.  Excessive bleeding (blood soaking through dressing) or unexpected drainage from the wound.  Extreme redness or swelling around the incision site, drainage of pus or foul smelling  drainage.  Inability to urinate or empty your bladder within 8 hours.  Problems with breathing or chest pain.    You should call 911 if you develop problems with breathing or chest pain.  If you are unable to contact your doctor or surgical center, you should go to the nearest emergency room or urgent care center.      Physician's telephone #: [phone] Dr. Jiang    MILD FLU-LIKE SYMPTOMS ARE NORMAL.  YOU MAY EXPERIENCE GENERALIZED MUSCLE ACHES, THROAT IRRITATION, HEADACHE AND/OR SOME NAUSEA.    If any questions arise, call your doctor.  If your doctor is not available, please feel free to call the Surgical Center at [phone].  The Center is open Monday through Friday from 7AM to 7PM.      A registered nurse may call you a few days after your surgery to see how you are doing after your procedure.    You may also receive a survey in the mail within the next two weeks and we ask that you take a few moments to complete the survey and return it to us.  Our goal is to provide you with very good care and we value your comments.     Depression / Suicide Risk    As you are discharged from this [facility] facility, it is important to learn how to keep safe from harming yourself.    Recognize the warning signs:  Abrupt changes in personality, positive or negative- including increase in energy   Giving away possessions  Change in eating patterns- significant weight changes-  positive or negative  Change in sleeping patterns- unable to sleep or sleeping all the time   Unwillingness or inability to communicate  Depression  Unusual sadness, discouragement and loneliness  Talk of wanting to die  Neglect of personal appearance   Rebelliousness- reckless behavior  Withdrawal from people/activities they love  Confusion- inability to concentrate     If you or a loved one observes any of these behaviors or has concerns about self-harm, here's what you can do:  Talk about it- your feelings and reasons for harming  yourself  Remove any means that you might use to hurt yourself (examples: pills, rope, extension cords, firearm)  Get professional help from the community (Mental Health, Substance Abuse, psychological counseling)  Do not be alone:Call your Safe Contact- someone whom you trust who will be there for you.  Call your local CRISIS HOTLINE 298-2119 or 655-825-4611  Call your local Children's Mobile Crisis Response Team [place] (742) 010-2187 or www.[employer]  Call the toll free National Suicide Prevention Hotlines   National Suicide Prevention Lifeline 394-338-HVLC (7754)  National Hope Line Network 800-SUICIDE (563-1933)    I acknowledge receipt and understanding of these Home Care instructions.

## 2023-12-14 NOTE — PROGRESS NOTES
Medication history reviewed with PT at bedside    [facility] rec is complete per PT reporting    Allergies reviewed.     Patient denies any outpatient antibiotics in the last 30 days.     Patient is not taking anticoagulants.    Preferred pharmacy for this visit - [facility] on [address] ([phone])

## 2023-12-14 NOTE — ANESTHESIA POSTPROCEDURE EVALUATION
Patient: Tyrone Giraldo    Procedure Summary       Date: 12/14/23 Room / Location: [place] [id number] / SURGERY [facility]    Anesthesia Start: 0702 Anesthesia Stop: 0808    Procedure: LAPAROSCOPIC MICROWAVE THERMAL ABLATION (Abdomen) Diagnosis: (HEPATOCELLULAR CARCINOMA)    Surgeons: Dale Jiang M.D. Responsible Provider: Dave Dupree D.O.    Anesthesia Type: general ASA Status: 3            Final Anesthesia Type: general  Last vitals  BP   Blood Pressure: 116/57    Temp   35.8 °C (96.5 °F)    Pulse   84   Resp   16    SpO2   97 %      Anesthesia Post Evaluation    Patient location during evaluation: PACU  Patient participation: complete - patient participated  Level of consciousness: awake and alert    Airway patency: patent  Anesthetic complications: no  Cardiovascular status: hemodynamically stable  Respiratory status: acceptable  Hydration status: euvolemic    PONV: none          No notable events documented.     Nurse Pain Score: 0 (NPRS)

## 2023-12-14 NOTE — OR NURSING
Pt arrives from OR to PACU at 0803. Pt identification verified by team, pt placed on all monitors with alarms audible, report and care of pt received from Anesthesiologist and RN. Assessment completed, pt changed into hospital gown and provided with warm blankets. Pt denies any pain or nausea. Pt belongings in locker #47 x 1 bag per notes.  Pt's friend Jos notified and updated.  Report given to Zayra JEONG.

## 2023-12-14 NOTE — ANESTHESIA TIME REPORT
Anesthesia Start and Stop Event Times       Date Time Event    12/14/2023 0620 Ready for Procedure     0702 Anesthesia Start     0808 Anesthesia Stop          Responsible Staff  12/14/23      Name Role Begin End    Dave Dupree D.O. Anesth 0702 0808          Overtime Reason:  no overtime (within assigned shift)    Comments:

## 2023-12-14 NOTE — ANESTHESIA PREPROCEDURE EVALUATION
Case: [id number] Date/Time: 12/14/23 0645    Procedure: LAPAROSCOPIC MICROWAVE THERMAL ABLATION    Pre-op diagnosis: HEPATOCELLULAR CARCINOMA    Location: [facility] OR 10 / SURGERY [facility]    Surgeons: Dale Jaing M.D.          Anes H&P:  PAST MEDICAL HISTORY:   63 y.o. male who presents for Procedure(s):  LAPAROSCOPIC MICROWAVE THERMAL ABLATION.  He has current and past medical problems significant for:    - CAD c/b MI s/p stents x2 (2006 and 2009)  - TIAs following myocardial infarctions, flashes of light/transient blindness, no residual    Past Medical History:   Diagnosis Date    CAD (coronary artery disease)     PCI    Cancer (HCC) August/2023    Dental disorder 12/12/2023    no teeth    Esophageal varices with bleeding (HCC)     7 bands placed 08/17    Hemorrhagic disorder (HCC) August/2023    Hepatitis C August/2023    MI (myocardial infarction) (HCC)     x2, 2006 and 2009    Sciatica        SMOKING/ALCOHOL/RECREATIONAL DRUG USE:  Social History     Tobacco Use    Smoking status: Some Days     Types: Cigarettes     Passive exposure: Current    Smokeless tobacco: Never    Tobacco comments:     I am working on quitting and am down to 2-3 cigarettes per day   Vaping Use    Vaping Use: Never used   Substance Use Topics    Alcohol use: Not Currently     Comment: rare    Drug use: Yes     Types: Inhaled     Comment: I smoke cannabis     Social History     Substance and Sexual Activity   Drug Use Yes    Types: Inhaled    Comment: I smoke cannabis       PAST SURGICAL HISTORY:  Past Surgical History:   Procedure Laterality Date    IL UPPER GI ENDOSCOPY,DIAGNOSIS N/A 11/06/2023    Procedure: GASTROSCOPY;  Surgeon: Javier Og M.D.;  Location: SURGERY SAME DAY [facility];  Service: Gastroenterology    IL UPPER GI ENDOSCOPY,DIAGNOSIS N/A 08/17/2023    Procedure: GASTROSCOPY;  Surgeon: Dixon Zendejas M.D.;  Location: SURGERY SAME DAY [facility];  Service: Gastroenterology    IL UPPER GI ENDOSCOPY,LIGAT VARIX  N/A 08/17/2023    Procedure: GASTROSCOPY, WITH BANDING;  Surgeon: Dixon Zendejas M.D.;  Location: SURGERY SAME DAY [facility];  Service: Gastroenterology    OTHER  September/1987    Vasectomy    OTHER CARDIAC SURGERY      stent    VASECTOMY         ALLERGIES:   Allergies   Allergen Reactions    Morphine Unspecified     Patient reports that this 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     Current Outpatient Medications on File Prior to Encounter   Medication Sig Dispense Refill    midodrine (PROAMATINE) 5 MG Tab Take 1 Tablet by mouth 3 times a day with meals. 60 Tablet 0    nicotine (NICODERM) 7 MG/24HR PATCH 24 HR Place 1 Patch on the skin every 24 hours. (Patient not taking: Reported on 12/12/2023) 28 Patch 0    nicotine polacrilex (NICORETTE) 2 MG Gum Take 1 Each by mouth every 1 hour as needed for Smoking Cessation (For nicotine urge). (Patient not taking: Reported on 12/12/2023) 20 Each 1    senna-docusate (PERICOLACE OR SENOKOT S) 8.6-50 MG Tab Take 2 Tablets by mouth 2 times a day. (Patient not taking: Reported on 12/12/2023) 30 Tablet 0    carvedilol (COREG) 3.125 MG Tab Take 1 Tablet by mouth 2 times a day with meals. 60 Tablet 1    omeprazole (PRILOSEC) 20 MG delayed-release capsule Take 1 Capsule by mouth 2 times a day. 30 Capsule 1       LABS:  Lab Results   Component Value Date/Time    HEMOGLOBIN 7.9 (L) 11/09/2023 0932    HEMATOCRIT 27.0 (L) 11/09/2023 0932    WBC 4.0 (L) 11/09/2023 0932     Lab Results   Component Value Date/Time    SODIUM 136 11/09/2023 0317    POTASSIUM 3.9 11/09/2023 0317    CHLORIDE 102 11/09/2023 0317    CO2 24 11/09/2023 0317    GLUCOSE 234 (H) 11/09/2023 0317    BUN 10 11/09/2023 0317    CALCIUM 7.5 (L) 11/09/2023 0317         PREVIOUS ANESTHETICS: See EMR  __________________________________________    Relevant  Problems   CARDIAC   (positive) Coronary artery disease involving native coronary artery of native heart without angina pectoris   (positive) Secondary esophageal varices with bleeding (HCC)         (positive) Hepatocellular carcinoma (HCC)   (positive) Other cirrhosis of liver (HCC)      Other   (positive) History of MI (myocardial infarction)       Physical Exam    Airway   Mallampati: II  TM distance: >3 FB  Neck ROM: full       Cardiovascular - normal exam  Rhythm: regular  Rate: normal  (-) murmur     Dental   (+) upper dentures, lower dentures           Pulmonary - normal exam  Breath sounds clear to auscultation     Abdominal    Neurological - normal exam                   Anesthesia Plan    ASA 3   ASA physical status 3 criteria: CAD/stents (> 3 months), MI or angina - history (> 3 months) and CVA or TIA - history (> 3 months)    Plan - general       Airway plan will be ETT          Induction: intravenous    Postoperative Plan: Postoperative administration of opioids is intended.    Pertinent diagnostic labs and testing reviewed    Informed Consent:    Anesthetic plan and risks discussed with patient.    Use of blood products discussed with: patient whom consented to blood products.

## 2023-12-14 NOTE — ANESTHESIA PROCEDURE NOTES
Airway    Date/Time: 12/14/2023 7:10 AM    Performed by: Dave Dupree D.O.  Authorized by: Dave Dupree D.O.    Location:  OR  Urgency:  Elective  Indications for Airway Management:  Anesthesia      Spontaneous Ventilation: absent    Sedation Level:  Deep  Preoxygenated: Yes    Patient Position:  Sniffing  MILS Maintained Throughout: No    Mask Difficulty Assessment:  2 - vent by mask + OA or adjuvant +/- NMBA  Final Airway Type:  Endotracheal airway  Final Endotracheal Airway:  ETT  Cuffed: Yes    Technique Used for Successful ETT Placement:  Direct laryngoscopy    Insertion Site:  Oral  Blade Type:  Steff  Laryngoscope Blade/Videolaryngoscope Blade Size:  4  ETT Size (mm):  7.5  Measured from:  Lips  ETT to Lips (cm):  23  Placement Verified by: auscultation and capnometry    Cormack-Lehane Classification:  Grade I - full view of glottis  Number of Attempts at Approach:  1  Ventilation Between Attempts:  None  Number of Other Approaches Attempted:  0

## 2023-12-14 NOTE — OP REPORT
DATE OF SERVICE:  12/14/2023     INDICATIONS:   The patient is a 63-year-old male patient known to me with   cirrhosis and hepatoma.  He was found to have a large tumor in segment 6/7 of   the right lobe of the liver, so after a long discussion with the patient in   him getting some housing because he was homeless, we were able to proceed with   a laparoscopic ablation.     SURGEON:  Dale Jiang MD     ASSISTANT SURGEON:  DIANNA Hsu.:The indication for a surgical assistant in this surgery were indicated due to the complexity of the procedure.  Their role included aiding in the incision, retraction, holding of devices including cameras for laparoscopic procedures and closure of wounds.      ANESTHESIA:  Dave Dupree DO     ESTIMATED BLOOD LOSS:   Less than 5 mL.     COMPLICATIONS:  No complications.     FINDINGS:  The patient was actually found to have 2 hepatomas, one in segment   7/8 and second one, the largest one in 6/7.  Other findings are, we could only   ablate the one in segment 7/8 while the one in segment 6/7, we could not see   the posterior wall of it due to its location posterior and thus we aborted the   ablation of that tumor due to inability to see the whole tumor.     PROCEDURES DONE:  Laparoscopic microwave thermal ablation of segment 7/8 HCC   measuring about 3x2 cm using 140 irizarry at 2 minutes giving us 3.2x4 cm   ablation cavity.     CASE CLASS:  Clean.     DESCRIPTION OF PROCEDURE:  The patient was brought to OR, placed under general   anesthetic, prepped with chlorhexidine scrub, covered with sterile drapes,   given preoperative antibiotics.  Timeout was done, which was adequate.  At   that point, he was given 5 mL of 0.5% Marcaine with epinephrine in the right   lateral aspect of the umbilicus and under direct vision we incised the skin   and opened the abdomen under direct vision atraumatically with the open   technique, we placed 0 Vicryl suture on each side of the  fascia.  Joselyn   trocar was inserted.  Abdomen was insufflated to 15 mm of pressure.  He was   placed in reverse Trendelenburg left side down, exposing the right upper   quadrant.  Immediately noted the patient had significant cirrhosis throughout   and a very small amount of ascites, would not call it moderate size, it was   actually very small volume of ascites.  We then placed a 5 mm port   percutaneously under vision atraumatically in the right anterior lower   quadrant for the camera.  Camera was placed in that position using   laparoscopic ultrasound.  We then evaluated the liver and we were able to   identify 2 tumors, one was the original tumor that was in 6/7 measuring   somewhere around 4-5 cm in size.  Unfortunately, due to the curvature of the   liver and the nodularity and where it was located we could not see the   posterior wall of the liver.  We replaced a 11 spreading trocar a much higher   and laterally to see if we can get around the liver, but still had difficulty   seeing the whole tumor completely with ultrasound.  Because of this, it felt   was deemed unsafe to proceed with ablation since we could not see the far wall   of it, so we also identified a secondary lesion measuring about 3x2 cm in   segment 7/8.  So at that point, we then used a laparoscopic microwave thermal   ablation probe and inserted it under ultrasound guidance into the tumor.  We   then ran a 140 irizarry at 2 minutes giving us a 3.2x4 cm ablation cavity.  Once   completed, we could not identify the tumor anymore.  We then removed the   needle after following a small track ablation, desufflated the abdomen and   placed supine position, waited 5 minutes by the clock, reinflated and found   that there was no bleeding or bile leak.  At that point, the operation was   completed, again we did suction off a very small amount, maybe 100 mL of   ascites.  We desufflated the abdomen.  We then closed the preplaced 0 Vicryl   suture in  the umbilicus and then gave a total of 30 mL or 0.05% Marcaine with   epinephrine throughout the 5 stab wounds and port sites.  The skin was closed   using 4-0 Monocryl and then Dermabond was placed due to his ascites.  The   patient was extubated and transferred to recovery room.           ______________________________  MD SHEKHAR Robledo/NA    DD:  12/14/2023 08:11  DT:  12/14/2023 09:59    Job#:  [id number]    CC:Javier Og MD

## 2023-12-14 NOTE — OR NURSING
Patient arrived to unit at 1110. Patient is AOx4. Transferred to chair appropriately. Patient's pain is 0/10. Declines pain medication at this time. Patient's dressings to abdomen are CDI x5. Patient voiding appropriately. Tolerating liquids at this time. Prescriptions picked up by Jos, patient's friend. Discharge instructions discussed with the patient. Patient denies any further questions at this time. Patient discharged home to responsible adult at 1130.

## 2024-01-11 ENCOUNTER — HOSPITAL ENCOUNTER (OUTPATIENT)
Dept: LAB | Facility: MEDICAL CENTER | Age: 64
End: 2024-01-11
Attending: SURGERY
Payer: MEDICARE

## 2024-01-11 DIAGNOSIS — C22.0 HEPATOCELLULAR CARCINOMA (HCC): ICD-10-CM

## 2024-01-11 LAB
ALBUMIN SERPL BCP-MCNC: 3.5 G/DL (ref 3.2–4.9)
ALBUMIN/GLOB SERPL: 1 G/DL
ALP SERPL-CCNC: 92 U/L (ref 30–99)
ALT SERPL-CCNC: 31 U/L (ref 2–50)
ANION GAP SERPL CALC-SCNC: 12 MMOL/L (ref 7–16)
ANISOCYTOSIS BLD QL SMEAR: ABNORMAL
AST SERPL-CCNC: 42 U/L (ref 12–45)
BASOPHILS # BLD AUTO: 1.3 % (ref 0–1.8)
BASOPHILS # BLD: 0.05 K/UL (ref 0–0.12)
BILIRUB SERPL-MCNC: 0.7 MG/DL (ref 0.1–1.5)
BUN SERPL-MCNC: 10 MG/DL (ref 8–22)
CALCIUM ALBUM COR SERPL-MCNC: 8.5 MG/DL (ref 8.5–10.5)
CALCIUM SERPL-MCNC: 8.1 MG/DL (ref 8.5–10.5)
CHLORIDE SERPL-SCNC: 102 MMOL/L (ref 96–112)
CO2 SERPL-SCNC: 22 MMOL/L (ref 20–33)
COMMENT 1642: NORMAL
CREAT SERPL-MCNC: 0.73 MG/DL (ref 0.5–1.4)
EOSINOPHIL # BLD AUTO: 0.34 K/UL (ref 0–0.51)
EOSINOPHIL NFR BLD: 8.7 % (ref 0–6.9)
ERYTHROCYTE [DISTWIDTH] IN BLOOD BY AUTOMATED COUNT: 54.1 FL (ref 35.9–50)
GFR SERPLBLD CREATININE-BSD FMLA CKD-EPI: 101 ML/MIN/1.73 M 2
GLOBULIN SER CALC-MCNC: 3.4 G/DL (ref 1.9–3.5)
GLUCOSE SERPL-MCNC: 222 MG/DL (ref 65–99)
HCT VFR BLD AUTO: 27.7 % (ref 42–52)
HGB BLD-MCNC: 7.5 G/DL (ref 14–18)
HYPOCHROMIA BLD QL SMEAR: ABNORMAL
IMM GRANULOCYTES # BLD AUTO: 0.01 K/UL (ref 0–0.11)
IMM GRANULOCYTES NFR BLD AUTO: 0.3 % (ref 0–0.9)
LYMPHOCYTES # BLD AUTO: 0.61 K/UL (ref 1–4.8)
LYMPHOCYTES NFR BLD: 15.7 % (ref 22–41)
MCH RBC QN AUTO: 20.1 PG (ref 27–33)
MCHC RBC AUTO-ENTMCNC: 27.1 G/DL (ref 32.3–36.5)
MCV RBC AUTO: 74.3 FL (ref 81.4–97.8)
MICROCYTES BLD QL SMEAR: ABNORMAL
MONOCYTES # BLD AUTO: 0.36 K/UL (ref 0–0.85)
MONOCYTES NFR BLD AUTO: 9.3 % (ref 0–13.4)
MORPHOLOGY BLD-IMP: NORMAL
NEUTROPHILS # BLD AUTO: 2.52 K/UL (ref 1.82–7.42)
NEUTROPHILS NFR BLD: 64.7 % (ref 44–72)
NRBC # BLD AUTO: 0 K/UL
NRBC BLD-RTO: 0 /100 WBC (ref 0–0.2)
PLATELET # BLD AUTO: 109 K/UL (ref 164–446)
PLATELET BLD QL SMEAR: NORMAL
POTASSIUM SERPL-SCNC: 3.7 MMOL/L (ref 3.6–5.5)
PROT SERPL-MCNC: 6.9 G/DL (ref 6–8.2)
RBC # BLD AUTO: 3.73 M/UL (ref 4.7–6.1)
SODIUM SERPL-SCNC: 136 MMOL/L (ref 135–145)
WBC # BLD AUTO: 3.9 K/UL (ref 4.8–10.8)

## 2024-01-11 PROCEDURE — 36415 COLL VENOUS BLD VENIPUNCTURE: CPT

## 2024-01-11 PROCEDURE — 85025 COMPLETE CBC W/AUTO DIFF WBC: CPT

## 2024-01-11 PROCEDURE — 82105 ALPHA-FETOPROTEIN SERUM: CPT

## 2024-01-11 PROCEDURE — 80053 COMPREHEN METABOLIC PANEL: CPT

## 2024-01-12 LAB — RBC BLD AUTO: NORMAL

## 2024-01-13 LAB — AFP-TM SERPL-MCNC: 9 NG/ML (ref 0–9)

## 2024-01-15 ENCOUNTER — APPOINTMENT (OUTPATIENT)
Dept: RADIOLOGY | Facility: MEDICAL CENTER | Age: 64
End: 2024-01-15
Attending: SURGERY
Payer: MEDICARE

## 2024-01-22 ENCOUNTER — HOSPITAL ENCOUNTER (OUTPATIENT)
Dept: RADIOLOGY | Facility: MEDICAL CENTER | Age: 64
End: 2024-01-22
Attending: SURGERY
Payer: MEDICARE

## 2024-01-22 DIAGNOSIS — C22.0 HEPATOCELLULAR CARCINOMA (HCC): ICD-10-CM

## 2024-01-22 PROCEDURE — 700117 HCHG RX CONTRAST REV CODE 255: Mod: UD | Performed by: SURGERY

## 2024-01-22 PROCEDURE — 74160 CT ABDOMEN W/CONTRAST: CPT

## 2024-01-22 RX ADMIN — IOHEXOL 100 ML: 350 INJECTION, SOLUTION INTRAVENOUS at 09:18

## (undated) DEVICE — TOWEL STOP TIMEOUT SAFETY FLAG (40EA/CA)

## (undated) DEVICE — TUBE CONNECTING SUCTION - CLEAR PLASTIC STERILE 72 IN (50EA/CA)

## (undated) DEVICE — SET SUCTION/IRRIGATION WITH DISPOSABLE TIP (6/CA )PART #0250-070-520 IS A SUB

## (undated) DEVICE — DRAPESURG STERI-DRAPE LONG - (10/BX 4BX/CA)

## (undated) DEVICE — CANISTER SUCTION RIGID RED 1500CC (40EA/CA)

## (undated) DEVICE — GLOVE BIOGEL SZ 7.5 SURGICAL PF LTX - (50PR/BX 4BX/CA)

## (undated) DEVICE — SET EXTENSION WITH 2 PORTS (48EA/CA) ***PART #2C8610 IS A SUBSTITUTE*****

## (undated) DEVICE — PROBE SOLERO MICROWAVE APPLICATOR 29CM

## (undated) DEVICE — SPEEDBAND SUPERVIEW SUPER 7 (4/BX)

## (undated) DEVICE — SET LEADWIRE 5 LEAD BEDSIDE DISPOSABLE ECG (1SET OF 5/EA)

## (undated) DEVICE — GLOVE SZ 7 BIOGEL PI MICRO - PF LF (50PR/BX 4BX/CA)

## (undated) DEVICE — FILM CASSETTE ENDO

## (undated) DEVICE — SUTURE 0 VICRYL PLUS UR-6 - 27 INCH (36/BX)

## (undated) DEVICE — SENSOR OXIMETER ADULT SPO2 RD SET (20EA/BX)

## (undated) DEVICE — SET TUBING PNEUMOCLEAR HIGH FLOW SMOKE EVACUATION (10EA/BX)

## (undated) DEVICE — Device

## (undated) DEVICE — SODIUM CHL IRRIGATION 0.9% 1000ML (12EA/CA)

## (undated) DEVICE — DRAPE IOBAN II INCISE 23X17 - (10EA/BX 4BX/CA)

## (undated) DEVICE — SUTURE 4-0 MONOCRYL PLUS PS-2 - 27 INCH (36/BX)

## (undated) DEVICE — MASK PANORAMIC OXYGEN PRO2 (30EA/CA)

## (undated) DEVICE — SODIUM CHL. IRRIGATION 0.9% 3000ML (4EA/CA 65CA/PF)

## (undated) DEVICE — TUBE E-T HI-LO CUFF 7.5MM (10EA/PK)

## (undated) DEVICE — TROCAR Z THREAD12MM OPTICAL - NON BLADED (6/BX)

## (undated) DEVICE — KIT  I.V. START (100EA/CA)

## (undated) DEVICE — SUCTION INSTRUMENT YANKAUER BULBOUS TIP W/O VENT (50EA/CA)

## (undated) DEVICE — GLOVE BIOGEL PI ORTHO SZ 7.5 PF LF (40PR/BX)

## (undated) DEVICE — KIT CUSTOM PROCEDURE SINGLE FOR ENDO  (15/CA)

## (undated) DEVICE — LACTATED RINGERS INJ 1000 ML - (14EA/CA 60CA/PF)

## (undated) DEVICE — NEPTUNE 4 PORT MANIFOLD - (20/PK)

## (undated) DEVICE — ELECTRODE DUAL RETURN W/ CORD - (50/PK)

## (undated) DEVICE — TUBE E-T HI-LO CUFF 7.0MM (10EA/PK)

## (undated) DEVICE — WATER IRRIGATION STERILE 1000ML (12EA/CA)

## (undated) DEVICE — CHLORAPREP 26 ML APPLICATOR - ORANGE TINT(25/CA)

## (undated) DEVICE — COVER LIGHT HANDLE ALC PLUS DISP (18EA/BX)

## (undated) DEVICE — GOWN WARMING STANDARD FLEX - (30/CA)

## (undated) DEVICE — MASK OXYGEN VNYL ADLT MED CONC WITH 7 FOOT TUBING  - (50EA/CA)

## (undated) DEVICE — ELECTRODE 850 FOAM ADHESIVE - HYDROGEL RADIOTRNSPRNT (50/PK)

## (undated) DEVICE — BLOCK BITE MAXI DENTAL RETENTION RIM (100EA/BX)

## (undated) DEVICE — GOWN SURGEONS X-LARGE - DISP. (30/CA)

## (undated) DEVICE — STAPLER SKIN DISP - (6/BX 10BX/CA) VISISTAT

## (undated) DEVICE — GLOVE BIOGEL SZ 8 SURGICAL PF LTX - (50PR/BX 4BX/CA)

## (undated) DEVICE — CANISTER SUCTION 3000ML MECHANICAL FILTER AUTO SHUTOFF MEDI-VAC NONSTERILE LF DISP  (40EA/CA)

## (undated) DEVICE — GLOVE BIOGEL PI INDICATOR SZ 7.5 SURGICAL PF LF -(50/BX 4BX/CA)

## (undated) DEVICE — CATHETER IV NON-SAFETY 18 GA X 1 1/4 (50/BX 4BX/CA)

## (undated) DEVICE — TROCAR 5X100 NON BLADED Z-TH - READ KII (6/BX)

## (undated) DEVICE — TUBING CLEARLINK DUO-VENT - C-FLO (48EA/CA)

## (undated) DEVICE — PACK LAP CHOLE OR - (2EA/CA)

## (undated) DEVICE — SLEEVE VASO CALF MED - (10PR/CA)

## (undated) DEVICE — TROCAR LAPSCP 100MM 12MM NTHRD - (6/BX)

## (undated) DEVICE — SUTURE GENERAL